# Patient Record
Sex: FEMALE | Race: WHITE | NOT HISPANIC OR LATINO | Employment: OTHER | URBAN - METROPOLITAN AREA
[De-identification: names, ages, dates, MRNs, and addresses within clinical notes are randomized per-mention and may not be internally consistent; named-entity substitution may affect disease eponyms.]

---

## 2023-02-13 PROBLEM — N81.11 CYSTOCELE, MIDLINE: Status: ACTIVE | Noted: 2023-02-13

## 2023-02-13 PROBLEM — M25.562 LEFT KNEE PAIN: Status: ACTIVE | Noted: 2023-02-13

## 2023-02-13 PROBLEM — K59.09 CHRONIC CONSTIPATION: Status: ACTIVE | Noted: 2023-02-13

## 2023-02-13 PROBLEM — N81.4 UTERINE PROLAPSE: Status: ACTIVE | Noted: 2023-02-13

## 2023-02-13 PROBLEM — N32.81 OAB (OVERACTIVE BLADDER): Status: ACTIVE | Noted: 2023-02-13

## 2023-02-13 PROBLEM — K44.9 HIATAL HERNIA WITH GERD: Status: ACTIVE | Noted: 2023-02-13

## 2023-02-13 PROBLEM — I21.4 NON-ST ELEVATION (NSTEMI) MYOCARDIAL INFARCTION (MULTI): Status: ACTIVE | Noted: 2023-02-13

## 2023-02-13 PROBLEM — M54.50 LOW BACK PAIN: Status: ACTIVE | Noted: 2023-02-13

## 2023-02-13 PROBLEM — H91.90 HEARING LOSS: Status: ACTIVE | Noted: 2023-02-13

## 2023-02-13 PROBLEM — I25.10 CORONARY ARTERY DISEASE INVOLVING NATIVE HEART WITHOUT ANGINA PECTORIS: Status: ACTIVE | Noted: 2023-02-13

## 2023-02-13 PROBLEM — G47.33 OSA (OBSTRUCTIVE SLEEP APNEA): Status: ACTIVE | Noted: 2023-02-13

## 2023-02-13 PROBLEM — R73.09 ABNORMAL GLUCOSE: Status: ACTIVE | Noted: 2023-02-13

## 2023-02-13 PROBLEM — Z04.9 CONDITION NOT FOUND: Status: ACTIVE | Noted: 2023-02-13

## 2023-02-13 PROBLEM — N95.0 POSTMENOPAUSAL BLEEDING: Status: ACTIVE | Noted: 2023-02-13

## 2023-02-13 PROBLEM — J32.9 CHRONIC SINUSITIS: Status: ACTIVE | Noted: 2023-02-13

## 2023-02-13 PROBLEM — R15.9 FECAL INCONTINENCE: Status: ACTIVE | Noted: 2023-02-13

## 2023-02-13 PROBLEM — R73.9 ELEVATED BLOOD SUGAR: Status: ACTIVE | Noted: 2023-02-13

## 2023-02-13 PROBLEM — R32 URINARY INCONTINENCE: Status: ACTIVE | Noted: 2023-02-13

## 2023-02-13 PROBLEM — R53.83 FATIGUE: Status: ACTIVE | Noted: 2023-02-13

## 2023-02-13 PROBLEM — M79.7 FIBROMYALGIA: Status: ACTIVE | Noted: 2023-02-13

## 2023-02-13 PROBLEM — N39.46 MIXED INCONTINENCE URGE AND STRESS: Status: ACTIVE | Noted: 2023-02-13

## 2023-02-13 PROBLEM — D22.9 NEVUS: Status: ACTIVE | Noted: 2023-02-13

## 2023-02-13 PROBLEM — R13.10 DYSPHAGIA: Status: ACTIVE | Noted: 2023-02-13

## 2023-02-13 PROBLEM — K58.9 IBS (IRRITABLE BOWEL SYNDROME): Status: ACTIVE | Noted: 2023-02-13

## 2023-02-13 PROBLEM — N36.8 URETHRAL PROLAPSE: Status: ACTIVE | Noted: 2023-02-13

## 2023-02-13 PROBLEM — R07.9 CHEST PAIN: Status: ACTIVE | Noted: 2023-02-13

## 2023-02-13 PROBLEM — N28.89 RENAL MASS, LEFT: Status: ACTIVE | Noted: 2023-02-13

## 2023-02-13 PROBLEM — I10 HYPERTENSION: Status: ACTIVE | Noted: 2023-02-13

## 2023-02-13 PROBLEM — E55.9 VITAMIN D DEFICIENCY: Status: ACTIVE | Noted: 2023-02-13

## 2023-02-13 PROBLEM — K21.9 HIATAL HERNIA WITH GERD: Status: ACTIVE | Noted: 2023-02-13

## 2023-02-13 PROBLEM — M17.12 ARTHRITIS OF KNEE, LEFT: Status: ACTIVE | Noted: 2023-02-13

## 2023-02-13 RX ORDER — ATORVASTATIN CALCIUM 80 MG/1
1 TABLET, FILM COATED ORAL NIGHTLY
COMMUNITY
End: 2024-01-29 | Stop reason: WASHOUT

## 2023-02-13 RX ORDER — ASPIRIN 81 MG/1
1 TABLET ORAL DAILY
COMMUNITY

## 2023-02-13 RX ORDER — PANTOPRAZOLE SODIUM 40 MG/1
1 TABLET, DELAYED RELEASE ORAL DAILY
COMMUNITY
Start: 2022-06-07 | End: 2023-08-09

## 2023-02-13 RX ORDER — CALCIUM CARBONATE 300MG(750)
1 TABLET,CHEWABLE ORAL DAILY
COMMUNITY
End: 2024-01-29 | Stop reason: WASHOUT

## 2023-02-13 RX ORDER — METHOCARBAMOL 750 MG/1
1 TABLET, FILM COATED ORAL 3 TIMES DAILY PRN
COMMUNITY
Start: 2015-08-07 | End: 2023-03-21 | Stop reason: SDUPTHER

## 2023-02-13 RX ORDER — CHOLECALCIFEROL (VITAMIN D3) 25 MCG
TABLET ORAL
COMMUNITY
Start: 2020-10-20

## 2023-02-13 RX ORDER — DULOXETIN HYDROCHLORIDE 60 MG/1
1 CAPSULE, DELAYED RELEASE ORAL DAILY
COMMUNITY
End: 2023-10-02

## 2023-02-13 RX ORDER — NITROGLYCERIN 0.4 MG/1
1 TABLET SUBLINGUAL AS NEEDED
COMMUNITY

## 2023-02-13 RX ORDER — AMLODIPINE BESYLATE 5 MG/1
2 TABLET ORAL DAILY
COMMUNITY
Start: 2021-10-29

## 2023-02-13 RX ORDER — CETIRIZINE HYDROCHLORIDE 10 MG/1
1 TABLET ORAL AS NEEDED
COMMUNITY
End: 2024-01-29 | Stop reason: WASHOUT

## 2023-02-13 RX ORDER — TIZANIDINE 4 MG/1
1 TABLET ORAL AS NEEDED
COMMUNITY
End: 2023-07-24 | Stop reason: ALTCHOICE

## 2023-02-13 RX ORDER — CLOPIDOGREL BISULFATE 75 MG/1
1 TABLET ORAL DAILY
COMMUNITY

## 2023-02-13 RX ORDER — METOPROLOL SUCCINATE 25 MG/1
TABLET, EXTENDED RELEASE ORAL
COMMUNITY
End: 2023-03-21 | Stop reason: ALTCHOICE

## 2023-02-13 RX ORDER — ISOSORBIDE MONONITRATE 30 MG/1
1 TABLET, EXTENDED RELEASE ORAL 2 TIMES DAILY
COMMUNITY

## 2023-03-21 ENCOUNTER — OFFICE VISIT (OUTPATIENT)
Dept: PRIMARY CARE | Facility: CLINIC | Age: 68
End: 2023-03-21
Payer: MEDICARE

## 2023-03-21 ENCOUNTER — LAB (OUTPATIENT)
Dept: LAB | Facility: LAB | Age: 68
End: 2023-03-21
Payer: MEDICARE

## 2023-03-21 VITALS
HEART RATE: 67 BPM | SYSTOLIC BLOOD PRESSURE: 126 MMHG | RESPIRATION RATE: 18 BRPM | DIASTOLIC BLOOD PRESSURE: 66 MMHG | WEIGHT: 191.2 LBS | HEIGHT: 62 IN | BODY MASS INDEX: 35.19 KG/M2 | OXYGEN SATURATION: 97 %

## 2023-03-21 DIAGNOSIS — I10 HYPERTENSION, UNSPECIFIED TYPE: ICD-10-CM

## 2023-03-21 DIAGNOSIS — Z01.818 PRE-OPERATIVE CLEARANCE: Primary | Chronic | ICD-10-CM

## 2023-03-21 DIAGNOSIS — N28.89 RENAL MASS, LEFT: ICD-10-CM

## 2023-03-21 DIAGNOSIS — M79.7 FIBROMYALGIA: ICD-10-CM

## 2023-03-21 DIAGNOSIS — R73.9 HYPERGLYCEMIA: ICD-10-CM

## 2023-03-21 LAB
ALANINE AMINOTRANSFERASE (SGPT) (U/L) IN SER/PLAS: 23 U/L (ref 7–45)
ALBUMIN (G/DL) IN SER/PLAS: 4.3 G/DL (ref 3.4–5)
ALKALINE PHOSPHATASE (U/L) IN SER/PLAS: 58 U/L (ref 33–136)
ANION GAP IN SER/PLAS: 11 MMOL/L (ref 10–20)
ASPARTATE AMINOTRANSFERASE (SGOT) (U/L) IN SER/PLAS: 21 U/L (ref 9–39)
BASOPHILS (10*3/UL) IN BLOOD BY AUTOMATED COUNT: 0.06 X10E9/L (ref 0–0.1)
BASOPHILS/100 LEUKOCYTES IN BLOOD BY AUTOMATED COUNT: 0.8 % (ref 0–2)
BILIRUBIN TOTAL (MG/DL) IN SER/PLAS: 0.6 MG/DL (ref 0–1.2)
CALCIUM (MG/DL) IN SER/PLAS: 9.3 MG/DL (ref 8.6–10.3)
CARBON DIOXIDE, TOTAL (MMOL/L) IN SER/PLAS: 30 MMOL/L (ref 21–32)
CHLORIDE (MMOL/L) IN SER/PLAS: 103 MMOL/L (ref 98–107)
CREATININE (MG/DL) IN SER/PLAS: 1.02 MG/DL (ref 0.5–1.05)
EOSINOPHILS (10*3/UL) IN BLOOD BY AUTOMATED COUNT: 0.38 X10E9/L (ref 0–0.7)
EOSINOPHILS/100 LEUKOCYTES IN BLOOD BY AUTOMATED COUNT: 5 % (ref 0–6)
ERYTHROCYTE DISTRIBUTION WIDTH (RATIO) BY AUTOMATED COUNT: 18.5 % (ref 11.5–14.5)
ERYTHROCYTE MEAN CORPUSCULAR HEMOGLOBIN CONCENTRATION (G/DL) BY AUTOMATED: 30.9 G/DL (ref 32–36)
ERYTHROCYTE MEAN CORPUSCULAR VOLUME (FL) BY AUTOMATED COUNT: 71 FL (ref 80–100)
ERYTHROCYTES (10*6/UL) IN BLOOD BY AUTOMATED COUNT: 4.54 X10E12/L (ref 4–5.2)
GFR FEMALE: 60 ML/MIN/1.73M2
GLUCOSE (MG/DL) IN SER/PLAS: 106 MG/DL (ref 74–99)
HEMATOCRIT (%) IN BLOOD BY AUTOMATED COUNT: 32.4 % (ref 36–46)
HEMOGLOBIN (G/DL) IN BLOOD: 10 G/DL (ref 12–16)
IMMATURE GRANULOCYTES/100 LEUKOCYTES IN BLOOD BY AUTOMATED COUNT: 0.3 % (ref 0–0.9)
LEUKOCYTES (10*3/UL) IN BLOOD BY AUTOMATED COUNT: 7.6 X10E9/L (ref 4.4–11.3)
LYMPHOCYTES (10*3/UL) IN BLOOD BY AUTOMATED COUNT: 2.62 X10E9/L (ref 1.2–4.8)
LYMPHOCYTES/100 LEUKOCYTES IN BLOOD BY AUTOMATED COUNT: 34.3 % (ref 13–44)
MONOCYTES (10*3/UL) IN BLOOD BY AUTOMATED COUNT: 0.68 X10E9/L (ref 0.1–1)
MONOCYTES/100 LEUKOCYTES IN BLOOD BY AUTOMATED COUNT: 8.9 % (ref 2–10)
NEUTROPHILS (10*3/UL) IN BLOOD BY AUTOMATED COUNT: 3.87 X10E9/L (ref 1.2–7.7)
NEUTROPHILS/100 LEUKOCYTES IN BLOOD BY AUTOMATED COUNT: 50.7 % (ref 40–80)
PLATELETS (10*3/UL) IN BLOOD AUTOMATED COUNT: 362 X10E9/L (ref 150–450)
POTASSIUM (MMOL/L) IN SER/PLAS: 3.8 MMOL/L (ref 3.5–5.3)
PROTEIN TOTAL: 6.6 G/DL (ref 6.4–8.2)
SODIUM (MMOL/L) IN SER/PLAS: 140 MMOL/L (ref 136–145)
UREA NITROGEN (MG/DL) IN SER/PLAS: 19 MG/DL (ref 6–23)

## 2023-03-21 PROCEDURE — 3074F SYST BP LT 130 MM HG: CPT | Performed by: STUDENT IN AN ORGANIZED HEALTH CARE EDUCATION/TRAINING PROGRAM

## 2023-03-21 PROCEDURE — 1036F TOBACCO NON-USER: CPT | Performed by: STUDENT IN AN ORGANIZED HEALTH CARE EDUCATION/TRAINING PROGRAM

## 2023-03-21 PROCEDURE — 80053 COMPREHEN METABOLIC PANEL: CPT

## 2023-03-21 PROCEDURE — 85025 COMPLETE CBC W/AUTO DIFF WBC: CPT

## 2023-03-21 PROCEDURE — 3078F DIAST BP <80 MM HG: CPT | Performed by: STUDENT IN AN ORGANIZED HEALTH CARE EDUCATION/TRAINING PROGRAM

## 2023-03-21 PROCEDURE — 83036 HEMOGLOBIN GLYCOSYLATED A1C: CPT

## 2023-03-21 PROCEDURE — 99214 OFFICE O/P EST MOD 30 MIN: CPT | Performed by: STUDENT IN AN ORGANIZED HEALTH CARE EDUCATION/TRAINING PROGRAM

## 2023-03-21 PROCEDURE — 1159F MED LIST DOCD IN RCRD: CPT | Performed by: STUDENT IN AN ORGANIZED HEALTH CARE EDUCATION/TRAINING PROGRAM

## 2023-03-21 PROCEDURE — 36415 COLL VENOUS BLD VENIPUNCTURE: CPT

## 2023-03-21 PROCEDURE — 1157F ADVNC CARE PLAN IN RCRD: CPT | Performed by: STUDENT IN AN ORGANIZED HEALTH CARE EDUCATION/TRAINING PROGRAM

## 2023-03-21 PROCEDURE — 1160F RVW MEDS BY RX/DR IN RCRD: CPT | Performed by: STUDENT IN AN ORGANIZED HEALTH CARE EDUCATION/TRAINING PROGRAM

## 2023-03-21 RX ORDER — METHOCARBAMOL 750 MG/1
750 TABLET, FILM COATED ORAL 3 TIMES DAILY PRN
Qty: 90 TABLET | Refills: 0 | Status: SHIPPED | OUTPATIENT
Start: 2023-03-21 | End: 2023-07-24 | Stop reason: ALTCHOICE

## 2023-03-21 RX ORDER — MELOXICAM 7.5 MG/1
1 TABLET ORAL 2 TIMES DAILY
COMMUNITY
Start: 2015-08-07 | End: 2023-07-24 | Stop reason: ALTCHOICE

## 2023-03-21 ASSESSMENT — PATIENT HEALTH QUESTIONNAIRE - PHQ9
SUM OF ALL RESPONSES TO PHQ9 QUESTIONS 1 AND 2: 1
2. FEELING DOWN, DEPRESSED OR HOPELESS: SEVERAL DAYS
1. LITTLE INTEREST OR PLEASURE IN DOING THINGS: NOT AT ALL
10. IF YOU CHECKED OFF ANY PROBLEMS, HOW DIFFICULT HAVE THESE PROBLEMS MADE IT FOR YOU TO DO YOUR WORK, TAKE CARE OF THINGS AT HOME, OR GET ALONG WITH OTHER PEOPLE: SOMEWHAT DIFFICULT

## 2023-03-21 ASSESSMENT — ENCOUNTER SYMPTOMS
OCCASIONAL FEELINGS OF UNSTEADINESS: 0
DEPRESSION: 0
LOSS OF SENSATION IN FEET: 1

## 2023-03-21 NOTE — PROGRESS NOTES
Subjective   Patient ID: Annie Joseph is a 67 y.o. female who presents for surgical clearance (Pt is here for pre surgical clearance at [facility], [place], operated by [facility] for kidney cancer, removal of left kidney.).      Cleared for surgery on 4/5/2023 for kidney removal due to ca    She has been cleared by cardiology for surgery    She has an event 10 days post surgery    Pt will be off plavix for 7 days prior         Review of Systems    Objective   Physical Exam    Assessment/Plan   Problem List Items Addressed This Visit          Circulatory    Hypertension    Relevant Orders    CBC and Auto Differential (Completed)    Comprehensive metabolic panel (Completed)    Hemoglobin A1C (Completed)       Genitourinary    Renal mass, left    Relevant Orders    CBC and Auto Differential (Completed)    Comprehensive metabolic panel (Completed)    Hemoglobin A1C (Completed)       Musculoskeletal    Fibromyalgia    Relevant Medications    methocarbamol (Robaxin) 750 mg tablet       Other    Hyperglycemia     A1C was 5.7.    We reviewed healthy lifestyle choices and changes. Advised to avoid fatty foods such as processed food, sweets; avoid foods heavy in sugar and salt. Maintain regular exercise 20 min daily or a total of 120 min weekly of moderate exercise. Labs were obtained and we will fu in 1-3 days with results. all other questions were answered to the patients satisfaction. We will follow up in 4-6months or sooner if needed.            Relevant Orders    Hemoglobin A1C (Completed)     Other Visit Diagnoses       Pre-operative clearance  (Chronic)   -  Primary    Back Pain   following with Dr. Antoine   continue duloxetine 60mg daily     Renal Mass   urology- planning for surgery 4/5/2023

## 2023-03-22 ENCOUNTER — TELEPHONE (OUTPATIENT)
Dept: PRIMARY CARE | Facility: CLINIC | Age: 68
End: 2023-03-22
Payer: MEDICARE

## 2023-03-22 LAB
ESTIMATED AVERAGE GLUCOSE FOR HBA1C: 131 MG/DL
HEMOGLOBIN A1C/HEMOGLOBIN TOTAL IN BLOOD: 6.2 %

## 2023-03-22 NOTE — TELEPHONE ENCOUNTER
----- Message from Slime Mcdermott DO sent at 3/22/2023  7:17 AM EDT -----  A1C was elevated at 6.2% but no reason to hold your surgery.   Liver and kidney numbers are normal.  Your blood counts are down. Have you been noticing any dark stools?

## 2023-03-29 NOTE — ASSESSMENT & PLAN NOTE
A1C was 5.7.    We reviewed healthy lifestyle choices and changes. Advised to avoid fatty foods such as processed food, sweets; avoid foods heavy in sugar and salt. Maintain regular exercise 20 min daily or a total of 120 min weekly of moderate exercise. Labs were obtained and we will fu in 1-3 days with results. all other questions were answered to the patients satisfaction. We will follow up in 4-6months or sooner if needed.

## 2023-07-20 PROBLEM — M25.569 KNEE PAIN: Status: ACTIVE | Noted: 2023-07-20

## 2023-07-20 PROBLEM — R73.9 ELEVATED BLOOD SUGAR: Status: ACTIVE | Noted: 2023-07-20

## 2023-07-20 PROBLEM — G47.19 EXCESSIVE DAYTIME SLEEPINESS: Status: ACTIVE | Noted: 2023-07-20

## 2023-07-20 PROBLEM — R29.818 SUSPECTED SLEEP APNEA: Status: ACTIVE | Noted: 2023-07-20

## 2023-07-20 PROBLEM — G47.31 CENTRAL SLEEP APNEA: Status: ACTIVE | Noted: 2023-07-20

## 2023-07-20 RX ORDER — HYDROCODONE BITARTRATE AND ACETAMINOPHEN 5; 325 MG/1; MG/1
TABLET ORAL
COMMUNITY
Start: 2023-04-07 | End: 2023-07-24 | Stop reason: ALTCHOICE

## 2023-07-20 RX ORDER — METOPROLOL SUCCINATE 25 MG/1
TABLET, EXTENDED RELEASE ORAL
COMMUNITY
End: 2023-07-24 | Stop reason: ALTCHOICE

## 2023-07-24 ENCOUNTER — OFFICE VISIT (OUTPATIENT)
Dept: PRIMARY CARE | Facility: CLINIC | Age: 68
End: 2023-07-24
Payer: MEDICARE

## 2023-07-24 VITALS
DIASTOLIC BLOOD PRESSURE: 56 MMHG | OXYGEN SATURATION: 98 % | BODY MASS INDEX: 34.48 KG/M2 | RESPIRATION RATE: 18 BRPM | HEIGHT: 62 IN | WEIGHT: 187.4 LBS | HEART RATE: 65 BPM | SYSTOLIC BLOOD PRESSURE: 142 MMHG

## 2023-07-24 DIAGNOSIS — Z78.0 ASYMPTOMATIC MENOPAUSAL STATE: ICD-10-CM

## 2023-07-24 DIAGNOSIS — I10 HYPERTENSION, UNSPECIFIED TYPE: ICD-10-CM

## 2023-07-24 DIAGNOSIS — I21.4 NON-ST ELEVATION MYOCARDIAL INFARCTION (NSTEMI) IN RECOVERY PHASE (MULTI): ICD-10-CM

## 2023-07-24 DIAGNOSIS — Z12.31 ENCOUNTER FOR SCREENING MAMMOGRAM FOR MALIGNANT NEOPLASM OF BREAST: ICD-10-CM

## 2023-07-24 DIAGNOSIS — Z12.39 ENCOUNTER FOR SCREENING FOR MALIGNANT NEOPLASM OF BREAST, UNSPECIFIED SCREENING MODALITY: ICD-10-CM

## 2023-07-24 DIAGNOSIS — R73.03 PREDIABETES: ICD-10-CM

## 2023-07-24 DIAGNOSIS — N39.41 URGE INCONTINENCE OF URINE: ICD-10-CM

## 2023-07-24 DIAGNOSIS — Z00.00 ROUTINE GENERAL MEDICAL EXAMINATION AT HEALTH CARE FACILITY: Primary | ICD-10-CM

## 2023-07-24 PROBLEM — Z04.9 CONDITION NOT FOUND: Status: RESOLVED | Noted: 2023-02-13 | Resolved: 2023-07-24

## 2023-07-24 PROCEDURE — 3078F DIAST BP <80 MM HG: CPT | Performed by: STUDENT IN AN ORGANIZED HEALTH CARE EDUCATION/TRAINING PROGRAM

## 2023-07-24 PROCEDURE — 3077F SYST BP >= 140 MM HG: CPT | Performed by: STUDENT IN AN ORGANIZED HEALTH CARE EDUCATION/TRAINING PROGRAM

## 2023-07-24 PROCEDURE — 1157F ADVNC CARE PLAN IN RCRD: CPT | Performed by: STUDENT IN AN ORGANIZED HEALTH CARE EDUCATION/TRAINING PROGRAM

## 2023-07-24 PROCEDURE — 1126F AMNT PAIN NOTED NONE PRSNT: CPT | Performed by: STUDENT IN AN ORGANIZED HEALTH CARE EDUCATION/TRAINING PROGRAM

## 2023-07-24 PROCEDURE — 1170F FXNL STATUS ASSESSED: CPT | Performed by: STUDENT IN AN ORGANIZED HEALTH CARE EDUCATION/TRAINING PROGRAM

## 2023-07-24 PROCEDURE — 1160F RVW MEDS BY RX/DR IN RCRD: CPT | Performed by: STUDENT IN AN ORGANIZED HEALTH CARE EDUCATION/TRAINING PROGRAM

## 2023-07-24 PROCEDURE — 99214 OFFICE O/P EST MOD 30 MIN: CPT | Performed by: STUDENT IN AN ORGANIZED HEALTH CARE EDUCATION/TRAINING PROGRAM

## 2023-07-24 PROCEDURE — 1036F TOBACCO NON-USER: CPT | Performed by: STUDENT IN AN ORGANIZED HEALTH CARE EDUCATION/TRAINING PROGRAM

## 2023-07-24 PROCEDURE — 1159F MED LIST DOCD IN RCRD: CPT | Performed by: STUDENT IN AN ORGANIZED HEALTH CARE EDUCATION/TRAINING PROGRAM

## 2023-07-24 ASSESSMENT — ENCOUNTER SYMPTOMS
DEPRESSION: 0
OCCASIONAL FEELINGS OF UNSTEADINESS: 0
LOSS OF SENSATION IN FEET: 0

## 2023-07-24 ASSESSMENT — ACTIVITIES OF DAILY LIVING (ADL)
DOING_HOUSEWORK: INDEPENDENT
GROCERY_SHOPPING: INDEPENDENT
DRESSING: INDEPENDENT
BATHING: INDEPENDENT
TAKING_MEDICATION: INDEPENDENT
MANAGING_FINANCES: INDEPENDENT

## 2023-07-24 ASSESSMENT — PATIENT HEALTH QUESTIONNAIRE - PHQ9
2. FEELING DOWN, DEPRESSED OR HOPELESS: NOT AT ALL
SUM OF ALL RESPONSES TO PHQ9 QUESTIONS 1 AND 2: 0
1. LITTLE INTEREST OR PLEASURE IN DOING THINGS: NOT AT ALL

## 2023-07-24 NOTE — PROGRESS NOTES
"Subjective   Reason for Visit: Annie Joseph is an 68 y.o. female here for a Medicare Wellness visit.               Down to   Severe urinary incontinence since her renal carcinoma     Incontinence   Urge incontinence   Worse since the surgery   Wearing briefs   Has been on the fence     S/p NSTEMI  Following with cardiology     Renal surgery  Left incision had infection complication   S/p 1 week antiobiotic about a month ago  She still feels something is not right  Increase in weight and fatigued     Pt declines PNA vaccines         Patient Care Team:  Slime Mcdermott DO as PCP - General  Slime Mcdermott DO as PCP - [facility] ACO Attributed Provider     Review of Systems    Objective   Vitals:  /56   Pulse 65   Resp 18   Ht 1.575 m (5' 2\")   Wt 85 kg (187 lb 6.4 oz)   SpO2 98%   BMI 34.28 kg/m²       Physical Exam  Constitutional:       Appearance: Normal appearance.   Cardiovascular:      Rate and Rhythm: Normal rate and regular rhythm.      Heart sounds: No murmur heard.  Pulmonary:      Effort: Pulmonary effort is normal. No respiratory distress.      Breath sounds: Normal breath sounds. No wheezing.   Musculoskeletal:      Cervical back: Neck supple.      Left lower leg: No edema.   Neurological:      Mental Status: She is alert.         Assessment/Plan   Problem List Items Addressed This Visit       Hypertension    Urinary incontinence    Relevant Orders    Referral to Physical Therapy    Non-ST elevation myocardial infarction (NSTEMI) in recovery phase (CMS/[facility])     Other Visit Diagnoses       Routine general medical examination at health care facility    -  Primary    Encounter for screening for malignant neoplasm of breast, unspecified screening modality        Relevant Orders    BI mammo bilateral screening tomosynthesis    Encounter for screening mammogram for malignant neoplasm of breast        Relevant Orders    BI mammo bilateral screening tomosynthesis    Asymptomatic menopausal state        " Relevant Orders    XR DEXA bone density    Prediabetes        Relevant Orders    Hemoglobin A1C

## 2023-07-28 NOTE — ASSESSMENT & PLAN NOTE
Continue isosorbide 30mg daily   Physical exam was stable. Discussed maintaining diets such as DASH to help maintain normal Blood pressure. Encouraged regular exercise for a total of 120 min weekly. We will fu labs in 1-3 days. For now there will be no change in medical management. All questions and concerns were addressed. Pt will follow up in 4-6 months or sooner if needed.

## 2023-08-08 DIAGNOSIS — K44.9 HIATAL HERNIA WITH GERD: ICD-10-CM

## 2023-08-08 DIAGNOSIS — K21.9 HIATAL HERNIA WITH GERD: ICD-10-CM

## 2023-08-09 RX ORDER — PANTOPRAZOLE SODIUM 40 MG/1
40 TABLET, DELAYED RELEASE ORAL DAILY
Qty: 90 TABLET | Refills: 1 | Status: SHIPPED | OUTPATIENT
Start: 2023-08-09 | End: 2024-01-30

## 2023-10-02 DIAGNOSIS — M79.7 FIBROMYALGIA: Primary | ICD-10-CM

## 2023-10-02 RX ORDER — DULOXETIN HYDROCHLORIDE 60 MG/1
60 CAPSULE, DELAYED RELEASE ORAL DAILY
Qty: 90 CAPSULE | Refills: 0 | Status: SHIPPED | OUTPATIENT
Start: 2023-10-02 | End: 2023-10-26 | Stop reason: HOSPADM

## 2023-10-25 ENCOUNTER — HOSPITAL ENCOUNTER (OUTPATIENT)
Facility: HOSPITAL | Age: 68
Setting detail: OBSERVATION
Discharge: HOME | End: 2023-10-26
Attending: EMERGENCY MEDICINE | Admitting: INTERNAL MEDICINE
Payer: MEDICARE

## 2023-10-25 ENCOUNTER — APPOINTMENT (OUTPATIENT)
Dept: RADIOLOGY | Facility: HOSPITAL | Age: 68
End: 2023-10-25
Payer: MEDICARE

## 2023-10-25 DIAGNOSIS — R07.9 CHEST PAIN, UNSPECIFIED TYPE: Primary | ICD-10-CM

## 2023-10-25 DIAGNOSIS — I10 HYPERTENSION, UNSPECIFIED TYPE: ICD-10-CM

## 2023-10-25 DIAGNOSIS — R13.10 DYSPHAGIA, UNSPECIFIED TYPE: ICD-10-CM

## 2023-10-25 LAB
ANION GAP SERPL CALC-SCNC: 10 MMOL/L
BASOPHILS # BLD AUTO: 0.06 X10*3/UL (ref 0–0.1)
BASOPHILS NFR BLD AUTO: 0.7 %
BUN SERPL-MCNC: 26 MG/DL (ref 8–25)
CALCIUM SERPL-MCNC: 8.7 MG/DL (ref 8.5–10.4)
CHLORIDE SERPL-SCNC: 103 MMOL/L (ref 97–107)
CO2 SERPL-SCNC: 24 MMOL/L (ref 24–31)
CREAT SERPL-MCNC: 1.4 MG/DL (ref 0.4–1.6)
CREAT SERPL-MCNC: 1.5 MG/DL (ref 0.4–1.6)
EOSINOPHIL # BLD AUTO: 0.53 X10*3/UL (ref 0–0.7)
EOSINOPHIL NFR BLD AUTO: 5.8 %
ERYTHROCYTE [DISTWIDTH] IN BLOOD BY AUTOMATED COUNT: 18.9 % (ref 11.5–14.5)
GFR SERPL CREATININE-BSD FRML MDRD: 38 ML/MIN/1.73M*2
GFR SERPL CREATININE-BSD FRML MDRD: 41 ML/MIN/1.73M*2
GLUCOSE SERPL-MCNC: 135 MG/DL (ref 65–99)
HCT VFR BLD AUTO: 29.9 % (ref 36–46)
HGB BLD-MCNC: 8.9 G/DL (ref 12–16)
IMM GRANULOCYTES # BLD AUTO: 0.02 X10*3/UL (ref 0–0.7)
IMM GRANULOCYTES NFR BLD AUTO: 0.2 % (ref 0–0.9)
LYMPHOCYTES # BLD AUTO: 3.24 X10*3/UL (ref 1.2–4.8)
LYMPHOCYTES NFR BLD AUTO: 35.3 %
MCH RBC QN AUTO: 20.6 PG (ref 26–34)
MCHC RBC AUTO-ENTMCNC: 29.8 G/DL (ref 32–36)
MCV RBC AUTO: 69 FL (ref 80–100)
MONOCYTES # BLD AUTO: 0.8 X10*3/UL (ref 0.1–1)
MONOCYTES NFR BLD AUTO: 8.7 %
NEUTROPHILS # BLD AUTO: 4.54 X10*3/UL (ref 1.2–7.7)
NEUTROPHILS NFR BLD AUTO: 49.3 %
NRBC BLD-RTO: 0 /100 WBCS (ref 0–0)
PLATELET # BLD AUTO: 398 X10*3/UL (ref 150–450)
PMV BLD AUTO: 10.1 FL (ref 7.5–11.5)
POTASSIUM SERPL-SCNC: 3.8 MMOL/L (ref 3.4–5.1)
RBC # BLD AUTO: 4.32 X10*6/UL (ref 4–5.2)
SODIUM SERPL-SCNC: 137 MMOL/L (ref 133–145)
TROPONIN T SERPL-MCNC: 14 NG/L
TROPONIN T SERPL-MCNC: 14 NG/L
WBC # BLD AUTO: 9.2 X10*3/UL (ref 4.4–11.3)

## 2023-10-25 PROCEDURE — 82565 ASSAY OF CREATININE: CPT | Performed by: EMERGENCY MEDICINE

## 2023-10-25 PROCEDURE — 71250 CT THORAX DX C-: CPT | Mod: MG

## 2023-10-25 PROCEDURE — 36415 COLL VENOUS BLD VENIPUNCTURE: CPT | Performed by: EMERGENCY MEDICINE

## 2023-10-25 PROCEDURE — 96375 TX/PRO/DX INJ NEW DRUG ADDON: CPT

## 2023-10-25 PROCEDURE — 96361 HYDRATE IV INFUSION ADD-ON: CPT

## 2023-10-25 PROCEDURE — 80048 BASIC METABOLIC PNL TOTAL CA: CPT | Performed by: EMERGENCY MEDICINE

## 2023-10-25 PROCEDURE — 96374 THER/PROPH/DIAG INJ IV PUSH: CPT

## 2023-10-25 PROCEDURE — 99285 EMERGENCY DEPT VISIT HI MDM: CPT | Mod: 25 | Performed by: EMERGENCY MEDICINE

## 2023-10-25 PROCEDURE — 2500000001 HC RX 250 WO HCPCS SELF ADMINISTERED DRUGS (ALT 637 FOR MEDICARE OP): Performed by: EMERGENCY MEDICINE

## 2023-10-25 PROCEDURE — 84484 ASSAY OF TROPONIN QUANT: CPT | Mod: 59 | Performed by: EMERGENCY MEDICINE

## 2023-10-25 PROCEDURE — 84484 ASSAY OF TROPONIN QUANT: CPT | Performed by: EMERGENCY MEDICINE

## 2023-10-25 PROCEDURE — 2500000004 HC RX 250 GENERAL PHARMACY W/ HCPCS (ALT 636 FOR OP/ED): Performed by: EMERGENCY MEDICINE

## 2023-10-25 PROCEDURE — 85025 COMPLETE CBC W/AUTO DIFF WBC: CPT | Performed by: EMERGENCY MEDICINE

## 2023-10-25 PROCEDURE — G0378 HOSPITAL OBSERVATION PER HR: HCPCS

## 2023-10-25 PROCEDURE — 71046 X-RAY EXAM CHEST 2 VIEWS: CPT | Mod: FY

## 2023-10-25 RX ORDER — NAPROXEN SODIUM 220 MG/1
243 TABLET, FILM COATED ORAL ONCE
Status: COMPLETED | OUTPATIENT
Start: 2023-10-25 | End: 2023-10-25

## 2023-10-25 RX ORDER — ATORVASTATIN CALCIUM 80 MG/1
80 TABLET, FILM COATED ORAL NIGHTLY
Status: CANCELLED | OUTPATIENT
Start: 2023-10-25

## 2023-10-25 RX ORDER — MORPHINE SULFATE 4 MG/ML
4 INJECTION, SOLUTION INTRAMUSCULAR; INTRAVENOUS ONCE
Status: COMPLETED | OUTPATIENT
Start: 2023-10-25 | End: 2023-10-25

## 2023-10-25 RX ORDER — ONDANSETRON HYDROCHLORIDE 2 MG/ML
4 INJECTION, SOLUTION INTRAVENOUS ONCE
Status: COMPLETED | OUTPATIENT
Start: 2023-10-25 | End: 2023-10-25

## 2023-10-25 RX ADMIN — SODIUM CHLORIDE 500 ML: 900 INJECTION, SOLUTION INTRAVENOUS at 20:44

## 2023-10-25 RX ADMIN — ONDANSETRON 4 MG: 2 INJECTION INTRAMUSCULAR; INTRAVENOUS at 20:44

## 2023-10-25 RX ADMIN — MORPHINE SULFATE 4 MG: 4 INJECTION, SOLUTION INTRAMUSCULAR; INTRAVENOUS at 20:45

## 2023-10-25 RX ADMIN — ASPIRIN 81 MG CHEWABLE TABLET 243 MG: 81 TABLET CHEWABLE at 20:08

## 2023-10-25 ASSESSMENT — PAIN SCALES - GENERAL
PAINLEVEL_OUTOF10: 6
PAINLEVEL_OUTOF10: 2
PAINLEVEL_OUTOF10: 4

## 2023-10-25 ASSESSMENT — HEART SCORE
HISTORY: SLIGHTLY SUSPICIOUS
AGE: 65+
RISK FACTORS: >2 RISK FACTORS OR HX OF ATHEROSCLEROTIC DISEASE
HEART SCORE: 4
TROPONIN: LESS THAN OR EQUAL TO NORMAL LIMIT
ECG: NORMAL

## 2023-10-25 ASSESSMENT — PAIN DESCRIPTION - PAIN TYPE: TYPE: ACUTE PAIN

## 2023-10-25 ASSESSMENT — LIFESTYLE VARIABLES
EVER HAD A DRINK FIRST THING IN THE MORNING TO STEADY YOUR NERVES TO GET RID OF A HANGOVER: NO
EVER FELT BAD OR GUILTY ABOUT YOUR DRINKING: NO
REASON UNABLE TO ASSESS: NO
HAVE PEOPLE ANNOYED YOU BY CRITICIZING YOUR DRINKING: NO
HAVE YOU EVER FELT YOU SHOULD CUT DOWN ON YOUR DRINKING: NO

## 2023-10-25 ASSESSMENT — COLUMBIA-SUICIDE SEVERITY RATING SCALE - C-SSRS
1. IN THE PAST MONTH, HAVE YOU WISHED YOU WERE DEAD OR WISHED YOU COULD GO TO SLEEP AND NOT WAKE UP?: NO
2. HAVE YOU ACTUALLY HAD ANY THOUGHTS OF KILLING YOURSELF?: NO
6. HAVE YOU EVER DONE ANYTHING, STARTED TO DO ANYTHING, OR PREPARED TO DO ANYTHING TO END YOUR LIFE?: NO

## 2023-10-25 ASSESSMENT — PAIN - FUNCTIONAL ASSESSMENT: PAIN_FUNCTIONAL_ASSESSMENT: 0-10

## 2023-10-25 ASSESSMENT — PAIN DESCRIPTION - LOCATION: LOCATION: CHEST

## 2023-10-25 NOTE — ED TRIAGE NOTES
Pt presents ambulatory through triage with c/o CP that started at 1830. Pt took 3 doses of SL nitroglycerin and 81 mg orf asa before arrival to the ED. Pt has a hx of MI from December of 2022. Pt is on plavix and states she has been off of it for 6 days because of a dental procedure. Pt has no c/o SOB

## 2023-10-26 ENCOUNTER — APPOINTMENT (OUTPATIENT)
Dept: CARDIOLOGY | Facility: HOSPITAL | Age: 68
End: 2023-10-26
Payer: MEDICARE

## 2023-10-26 VITALS
TEMPERATURE: 98.6 F | WEIGHT: 188 LBS | SYSTOLIC BLOOD PRESSURE: 134 MMHG | HEIGHT: 62 IN | BODY MASS INDEX: 34.6 KG/M2 | HEART RATE: 77 BPM | OXYGEN SATURATION: 96 % | DIASTOLIC BLOOD PRESSURE: 60 MMHG | RESPIRATION RATE: 18 BRPM

## 2023-10-26 LAB
ANION GAP SERPL CALC-SCNC: 11 MMOL/L
ATRIAL RATE: 77 BPM
BUN SERPL-MCNC: 21 MG/DL (ref 8–25)
CALCIUM SERPL-MCNC: 8.8 MG/DL (ref 8.5–10.4)
CHLORIDE SERPL-SCNC: 103 MMOL/L (ref 97–107)
CHOLEST SERPL-MCNC: 188 MG/DL (ref 133–200)
CHOLEST/HDLC SERPL: 3.3 {RATIO}
CO2 SERPL-SCNC: 24 MMOL/L (ref 24–31)
CREAT SERPL-MCNC: 1.4 MG/DL (ref 0.4–1.6)
ERYTHROCYTE [DISTWIDTH] IN BLOOD BY AUTOMATED COUNT: 19.2 % (ref 11.5–14.5)
EST. AVERAGE GLUCOSE BLD GHB EST-MCNC: 108 MG/DL
FERRITIN SERPL-MCNC: 7 NG/ML (ref 13–150)
GFR SERPL CREATININE-BSD FRML MDRD: 41 ML/MIN/1.73M*2
GLUCOSE SERPL-MCNC: 103 MG/DL (ref 65–99)
HBA1C MFR BLD: 5.4 %
HCT VFR BLD AUTO: 31.9 % (ref 36–46)
HDLC SERPL-MCNC: 57 MG/DL
HGB BLD-MCNC: 9.3 G/DL (ref 12–16)
IRON SATN MFR SERPL: ABNORMAL %
IRON SERPL-MCNC: <20 UG/DL (ref 30–160)
LDLC SERPL CALC-MCNC: 116 MG/DL (ref 65–130)
MCH RBC QN AUTO: 20.4 PG (ref 26–34)
MCHC RBC AUTO-ENTMCNC: 29.2 G/DL (ref 32–36)
MCV RBC AUTO: 70 FL (ref 80–100)
NRBC BLD-RTO: 0 /100 WBCS (ref 0–0)
P AXIS: 41 DEGREES
P OFFSET: 196 MS
P ONSET: 139 MS
PLATELET # BLD AUTO: 402 X10*3/UL (ref 150–450)
PMV BLD AUTO: 10.1 FL (ref 7.5–11.5)
POTASSIUM SERPL-SCNC: 4.2 MMOL/L (ref 3.4–5.1)
PR INTERVAL: 162 MS
Q ONSET: 220 MS
QRS COUNT: 13 BEATS
QRS DURATION: 96 MS
QT INTERVAL: 430 MS
QTC CALCULATION(BAZETT): 486 MS
QTC FREDERICIA: 467 MS
R AXIS: 7 DEGREES
RBC # BLD AUTO: 4.57 X10*6/UL (ref 4–5.2)
SODIUM SERPL-SCNC: 138 MMOL/L (ref 133–145)
T AXIS: 55 DEGREES
T OFFSET: 435 MS
TIBC SERPL-MCNC: ABNORMAL UG/DL
TRIGL SERPL-MCNC: 74 MG/DL (ref 40–150)
TROPONIN T SERPL-MCNC: 14 NG/L
UIBC SERPL-MCNC: 391 UG/DL (ref 110–370)
VENTRICULAR RATE: 77 BPM
WBC # BLD AUTO: 8.6 X10*3/UL (ref 4.4–11.3)

## 2023-10-26 PROCEDURE — 93005 ELECTROCARDIOGRAM TRACING: CPT

## 2023-10-26 PROCEDURE — 97116 GAIT TRAINING THERAPY: CPT | Mod: GP

## 2023-10-26 PROCEDURE — 2500000004 HC RX 250 GENERAL PHARMACY W/ HCPCS (ALT 636 FOR OP/ED): Performed by: INTERNAL MEDICINE

## 2023-10-26 PROCEDURE — 83540 ASSAY OF IRON: CPT | Performed by: INTERNAL MEDICINE

## 2023-10-26 PROCEDURE — 36415 COLL VENOUS BLD VENIPUNCTURE: CPT | Performed by: INTERNAL MEDICINE

## 2023-10-26 PROCEDURE — 94660 CPAP INITIATION&MGMT: CPT

## 2023-10-26 PROCEDURE — 80048 BASIC METABOLIC PNL TOTAL CA: CPT | Performed by: INTERNAL MEDICINE

## 2023-10-26 PROCEDURE — 82728 ASSAY OF FERRITIN: CPT | Performed by: INTERNAL MEDICINE

## 2023-10-26 PROCEDURE — 96375 TX/PRO/DX INJ NEW DRUG ADDON: CPT

## 2023-10-26 PROCEDURE — G0378 HOSPITAL OBSERVATION PER HR: HCPCS

## 2023-10-26 PROCEDURE — 84484 ASSAY OF TROPONIN QUANT: CPT | Performed by: EMERGENCY MEDICINE

## 2023-10-26 PROCEDURE — 80061 LIPID PANEL: CPT | Performed by: INTERNAL MEDICINE

## 2023-10-26 PROCEDURE — 2500000004 HC RX 250 GENERAL PHARMACY W/ HCPCS (ALT 636 FOR OP/ED): Mod: MUE | Performed by: INTERNAL MEDICINE

## 2023-10-26 PROCEDURE — 2500000001 HC RX 250 WO HCPCS SELF ADMINISTERED DRUGS (ALT 637 FOR MEDICARE OP): Performed by: INTERNAL MEDICINE

## 2023-10-26 PROCEDURE — 97166 OT EVAL MOD COMPLEX 45 MIN: CPT | Mod: GO

## 2023-10-26 PROCEDURE — 85027 COMPLETE CBC AUTOMATED: CPT | Performed by: INTERNAL MEDICINE

## 2023-10-26 PROCEDURE — 83036 HEMOGLOBIN GLYCOSYLATED A1C: CPT | Performed by: INTERNAL MEDICINE

## 2023-10-26 PROCEDURE — 97530 THERAPEUTIC ACTIVITIES: CPT | Mod: GO

## 2023-10-26 PROCEDURE — 97161 PT EVAL LOW COMPLEX 20 MIN: CPT | Mod: GP

## 2023-10-26 RX ORDER — LORATADINE 10 MG/1
10 TABLET ORAL DAILY
Status: DISCONTINUED | OUTPATIENT
Start: 2023-10-26 | End: 2023-10-26 | Stop reason: HOSPADM

## 2023-10-26 RX ORDER — ACETAMINOPHEN 650 MG/1
650 SUPPOSITORY RECTAL EVERY 4 HOURS PRN
Status: DISCONTINUED | OUTPATIENT
Start: 2023-10-26 | End: 2023-10-26 | Stop reason: HOSPADM

## 2023-10-26 RX ORDER — ACETAMINOPHEN 325 MG/1
650 TABLET ORAL EVERY 4 HOURS PRN
Status: DISCONTINUED | OUTPATIENT
Start: 2023-10-26 | End: 2023-10-26 | Stop reason: HOSPADM

## 2023-10-26 RX ORDER — PANTOPRAZOLE SODIUM 40 MG/1
40 TABLET, DELAYED RELEASE ORAL DAILY
Qty: 30 TABLET | Refills: 1 | Status: SHIPPED | OUTPATIENT
Start: 2023-10-26 | End: 2023-10-26 | Stop reason: HOSPADM

## 2023-10-26 RX ORDER — LANOLIN ALCOHOL/MO/W.PET/CERES
400 CREAM (GRAM) TOPICAL DAILY
Status: DISCONTINUED | OUTPATIENT
Start: 2023-10-26 | End: 2023-10-26 | Stop reason: HOSPADM

## 2023-10-26 RX ORDER — ONDANSETRON HYDROCHLORIDE 2 MG/ML
4 INJECTION, SOLUTION INTRAVENOUS EVERY 8 HOURS PRN
Status: DISCONTINUED | OUTPATIENT
Start: 2023-10-26 | End: 2023-10-26 | Stop reason: HOSPADM

## 2023-10-26 RX ORDER — ONDANSETRON 4 MG/1
4 TABLET, FILM COATED ORAL EVERY 8 HOURS PRN
Status: DISCONTINUED | OUTPATIENT
Start: 2023-10-26 | End: 2023-10-26 | Stop reason: HOSPADM

## 2023-10-26 RX ORDER — ENOXAPARIN SODIUM 100 MG/ML
40 INJECTION SUBCUTANEOUS EVERY 24 HOURS
Status: DISCONTINUED | OUTPATIENT
Start: 2023-10-26 | End: 2023-10-26 | Stop reason: HOSPADM

## 2023-10-26 RX ORDER — POLYETHYLENE GLYCOL 3350 17 G/17G
17 POWDER, FOR SOLUTION ORAL DAILY PRN
Status: DISCONTINUED | OUTPATIENT
Start: 2023-10-26 | End: 2023-10-26 | Stop reason: HOSPADM

## 2023-10-26 RX ORDER — ASPIRIN 81 MG/1
81 TABLET ORAL DAILY
Status: DISCONTINUED | OUTPATIENT
Start: 2023-10-26 | End: 2023-10-26 | Stop reason: HOSPADM

## 2023-10-26 RX ORDER — PANTOPRAZOLE SODIUM 40 MG/1
40 TABLET, DELAYED RELEASE ORAL DAILY
Status: DISCONTINUED | OUTPATIENT
Start: 2023-10-26 | End: 2023-10-26 | Stop reason: HOSPADM

## 2023-10-26 RX ORDER — CLOPIDOGREL BISULFATE 75 MG/1
75 TABLET ORAL DAILY
Status: DISCONTINUED | OUTPATIENT
Start: 2023-10-26 | End: 2023-10-26 | Stop reason: HOSPADM

## 2023-10-26 RX ORDER — ISOSORBIDE MONONITRATE 30 MG/1
30 TABLET, EXTENDED RELEASE ORAL
Status: DISCONTINUED | OUTPATIENT
Start: 2023-10-26 | End: 2023-10-26 | Stop reason: HOSPADM

## 2023-10-26 RX ORDER — ACETAMINOPHEN 160 MG/5ML
650 SOLUTION ORAL EVERY 4 HOURS PRN
Status: DISCONTINUED | OUTPATIENT
Start: 2023-10-26 | End: 2023-10-26 | Stop reason: HOSPADM

## 2023-10-26 RX ORDER — AMLODIPINE BESYLATE 5 MG/1
5 TABLET ORAL DAILY
Status: DISCONTINUED | OUTPATIENT
Start: 2023-10-26 | End: 2023-10-26 | Stop reason: HOSPADM

## 2023-10-26 RX ORDER — DULOXETIN HYDROCHLORIDE 60 MG/1
60 CAPSULE, DELAYED RELEASE ORAL DAILY
Status: DISCONTINUED | OUTPATIENT
Start: 2023-10-26 | End: 2023-10-26 | Stop reason: HOSPADM

## 2023-10-26 RX ADMIN — CLOPIDOGREL BISULFATE 75 MG: 75 TABLET ORAL at 08:44

## 2023-10-26 RX ADMIN — AMLODIPINE BESYLATE 5 MG: 5 TABLET ORAL at 08:46

## 2023-10-26 RX ADMIN — Medication 400 MG: at 08:44

## 2023-10-26 RX ADMIN — IRON SUCROSE 200 MG: 20 INJECTION, SOLUTION INTRAVENOUS at 09:39

## 2023-10-26 RX ADMIN — ISOSORBIDE MONONITRATE 30 MG: 30 TABLET, EXTENDED RELEASE ORAL at 08:44

## 2023-10-26 RX ADMIN — ASPIRIN 81 MG: 81 TABLET, COATED ORAL at 08:45

## 2023-10-26 RX ADMIN — PANTOPRAZOLE SODIUM 40 MG: 40 TABLET, DELAYED RELEASE ORAL at 08:44

## 2023-10-26 RX ADMIN — ISOSORBIDE MONONITRATE 30 MG: 30 TABLET, EXTENDED RELEASE ORAL at 17:01

## 2023-10-26 SDOH — SOCIAL STABILITY: SOCIAL INSECURITY: ARE THERE ANY APPARENT SIGNS OF INJURIES/BEHAVIORS THAT COULD BE RELATED TO ABUSE/NEGLECT?: NO

## 2023-10-26 SDOH — SOCIAL STABILITY: SOCIAL INSECURITY: WITHIN THE LAST YEAR, HAVE YOU BEEN AFRAID OF YOUR PARTNER OR EX-PARTNER?: NO

## 2023-10-26 SDOH — SOCIAL STABILITY: SOCIAL NETWORK: ARE YOU MARRIED, WIDOWED, DIVORCED, SEPARATED, NEVER MARRIED, OR LIVING WITH A PARTNER?: PATIENT DECLINED

## 2023-10-26 SDOH — ECONOMIC STABILITY: FOOD INSECURITY: WITHIN THE PAST 12 MONTHS, YOU WORRIED THAT YOUR FOOD WOULD RUN OUT BEFORE YOU GOT MONEY TO BUY MORE.: NEVER TRUE

## 2023-10-26 SDOH — SOCIAL STABILITY: SOCIAL INSECURITY
WITHIN THE LAST YEAR, HAVE YOU BEEN KICKED, HIT, SLAPPED, OR OTHERWISE PHYSICALLY HURT BY YOUR PARTNER OR EX-PARTNER?: NO

## 2023-10-26 SDOH — HEALTH STABILITY: MENTAL HEALTH
STRESS IS WHEN SOMEONE FEELS TENSE, NERVOUS, ANXIOUS, OR CAN'T SLEEP AT NIGHT BECAUSE THEIR MIND IS TROUBLED. HOW STRESSED ARE YOU?: PATIENT DECLINED

## 2023-10-26 SDOH — SOCIAL STABILITY: SOCIAL INSECURITY: DO YOU FEEL UNSAFE GOING BACK TO THE PLACE WHERE YOU ARE LIVING?: NO

## 2023-10-26 SDOH — SOCIAL STABILITY: SOCIAL INSECURITY
WITHIN THE LAST YEAR, HAVE TO BEEN RAPED OR FORCED TO HAVE ANY KIND OF SEXUAL ACTIVITY BY YOUR PARTNER OR EX-PARTNER?: NO

## 2023-10-26 SDOH — SOCIAL STABILITY: SOCIAL INSECURITY: WITHIN THE LAST YEAR, HAVE YOU BEEN HUMILIATED OR EMOTIONALLY ABUSED IN OTHER WAYS BY YOUR PARTNER OR EX-PARTNER?: NO

## 2023-10-26 SDOH — ECONOMIC STABILITY: HOUSING INSECURITY
IN THE LAST 12 MONTHS, WAS THERE A TIME WHEN YOU DID NOT HAVE A STEADY PLACE TO SLEEP OR SLEPT IN A SHELTER (INCLUDING NOW)?: NO

## 2023-10-26 SDOH — SOCIAL STABILITY: SOCIAL NETWORK: HOW OFTEN DO YOU GET TOGETHER WITH FRIENDS OR RELATIVES?: PATIENT DECLINED

## 2023-10-26 SDOH — ECONOMIC STABILITY: INCOME INSECURITY: IN THE PAST 12 MONTHS, HAS THE ELECTRIC, GAS, OIL, OR WATER COMPANY THREATENED TO SHUT OFF SERVICE IN YOUR HOME?: NO

## 2023-10-26 SDOH — SOCIAL STABILITY: SOCIAL INSECURITY: WERE YOU ABLE TO COMPLETE ALL THE BEHAVIORAL HEALTH SCREENINGS?: YES

## 2023-10-26 SDOH — SOCIAL STABILITY: SOCIAL INSECURITY: ARE YOU OR HAVE YOU BEEN THREATENED OR ABUSED PHYSICALLY, EMOTIONALLY, OR SEXUALLY BY ANYONE?: NO

## 2023-10-26 SDOH — ECONOMIC STABILITY: TRANSPORTATION INSECURITY
IN THE PAST 12 MONTHS, HAS LACK OF TRANSPORTATION KEPT YOU FROM MEETINGS, WORK, OR FROM GETTING THINGS NEEDED FOR DAILY LIVING?: NO

## 2023-10-26 SDOH — ECONOMIC STABILITY: TRANSPORTATION INSECURITY
IN THE PAST 12 MONTHS, HAS THE LACK OF TRANSPORTATION KEPT YOU FROM MEDICAL APPOINTMENTS OR FROM GETTING MEDICATIONS?: NO

## 2023-10-26 SDOH — HEALTH STABILITY: PHYSICAL HEALTH: ON AVERAGE, HOW MANY DAYS PER WEEK DO YOU ENGAGE IN MODERATE TO STRENUOUS EXERCISE (LIKE A BRISK WALK)?: 0 DAYS

## 2023-10-26 SDOH — SOCIAL STABILITY: SOCIAL NETWORK: IN A TYPICAL WEEK, HOW MANY TIMES DO YOU TALK ON THE PHONE WITH FAMILY, FRIENDS, OR NEIGHBORS?: PATIENT DECLINED

## 2023-10-26 SDOH — SOCIAL STABILITY: SOCIAL NETWORK: HOW OFTEN DO YOU ATTENT MEETINGS OF THE CLUB OR ORGANIZATION YOU BELONG TO?: PATIENT DECLINED

## 2023-10-26 SDOH — SOCIAL STABILITY: SOCIAL NETWORK: HOW OFTEN DO YOU ATTEND CHURCH OR RELIGIOUS SERVICES?: PATIENT DECLINED

## 2023-10-26 SDOH — SOCIAL STABILITY: SOCIAL INSECURITY: HAVE YOU HAD THOUGHTS OF HARMING ANYONE ELSE?: NO

## 2023-10-26 SDOH — SOCIAL STABILITY: SOCIAL INSECURITY: DOES ANYONE TRY TO KEEP YOU FROM HAVING/CONTACTING OTHER FRIENDS OR DOING THINGS OUTSIDE YOUR HOME?: NO

## 2023-10-26 SDOH — ECONOMIC STABILITY: FOOD INSECURITY: WITHIN THE PAST 12 MONTHS, THE FOOD YOU BOUGHT JUST DIDN'T LAST AND YOU DIDN'T HAVE MONEY TO GET MORE.: NEVER TRUE

## 2023-10-26 SDOH — ECONOMIC STABILITY: INCOME INSECURITY: IN THE LAST 12 MONTHS, WAS THERE A TIME WHEN YOU WERE NOT ABLE TO PAY THE MORTGAGE OR RENT ON TIME?: NO

## 2023-10-26 SDOH — SOCIAL STABILITY: SOCIAL NETWORK
DO YOU BELONG TO ANY CLUBS OR ORGANIZATIONS SUCH AS CHURCH GROUPS UNIONS, FRATERNAL OR ATHLETIC GROUPS, OR SCHOOL GROUPS?: PATIENT DECLINED

## 2023-10-26 SDOH — HEALTH STABILITY: PHYSICAL HEALTH: ON AVERAGE, HOW MANY MINUTES DO YOU ENGAGE IN EXERCISE AT THIS LEVEL?: 0 MIN

## 2023-10-26 SDOH — SOCIAL STABILITY: SOCIAL INSECURITY: ABUSE: ADULT

## 2023-10-26 SDOH — SOCIAL STABILITY: SOCIAL INSECURITY: DO YOU FEEL ANYONE HAS EXPLOITED OR TAKEN ADVANTAGE OF YOU FINANCIALLY OR OF YOUR PERSONAL PROPERTY?: NO

## 2023-10-26 SDOH — SOCIAL STABILITY: SOCIAL INSECURITY: HAS ANYONE EVER THREATENED TO HURT YOUR FAMILY OR YOUR PETS?: NO

## 2023-10-26 SDOH — ECONOMIC STABILITY: HOUSING INSECURITY: IN THE LAST 12 MONTHS, HOW MANY PLACES HAVE YOU LIVED?: 1

## 2023-10-26 SDOH — ECONOMIC STABILITY: INCOME INSECURITY: HOW HARD IS IT FOR YOU TO PAY FOR THE VERY BASICS LIKE FOOD, HOUSING, MEDICAL CARE, AND HEATING?: NOT HARD AT ALL

## 2023-10-26 ASSESSMENT — ACTIVITIES OF DAILY LIVING (ADL)
WALKS IN HOME: INDEPENDENT
DRESSING YOURSELF: INDEPENDENT
HEARING - RIGHT EAR: FUNCTIONAL
ADL_ASSISTANCE: INDEPENDENT
GROOMING: INDEPENDENT
ADEQUATE_TO_COMPLETE_ADL: NO
HEARING - RIGHT EAR: FUNCTIONAL
BATHING_ASSISTANCE: MINIMAL
FEEDING YOURSELF: INDEPENDENT
JUDGMENT_ADEQUATE_SAFELY_COMPLETE_DAILY_ACTIVITIES: NO
DRESSING YOURSELF: INDEPENDENT
JUDGMENT_ADEQUATE_SAFELY_COMPLETE_DAILY_ACTIVITIES: NO
BATHING: INDEPENDENT
WALKS IN HOME: INDEPENDENT
LACK_OF_TRANSPORTATION: NO
FEEDING YOURSELF: INDEPENDENT
GROOMING: INDEPENDENT
TOILETING: INDEPENDENT
PATIENT'S MEMORY ADEQUATE TO SAFELY COMPLETE DAILY ACTIVITIES?: NO
HEARING - LEFT EAR: FUNCTIONAL
ADEQUATE_TO_COMPLETE_ADL: NO
ASSISTIVE_DEVICE: EYEGLASSES
LACK_OF_TRANSPORTATION: NO
ASSISTIVE_DEVICE: EYEGLASSES
BATHING: INDEPENDENT
TOILETING: INDEPENDENT
HEARING - LEFT EAR: FUNCTIONAL
PATIENT'S MEMORY ADEQUATE TO SAFELY COMPLETE DAILY ACTIVITIES?: NO

## 2023-10-26 ASSESSMENT — PAIN - FUNCTIONAL ASSESSMENT
PAIN_FUNCTIONAL_ASSESSMENT: 0-10

## 2023-10-26 ASSESSMENT — PATIENT HEALTH QUESTIONNAIRE - PHQ9
SUM OF ALL RESPONSES TO PHQ9 QUESTIONS 1 & 2: 0
2. FEELING DOWN, DEPRESSED OR HOPELESS: NOT AT ALL
1. LITTLE INTEREST OR PLEASURE IN DOING THINGS: NOT AT ALL

## 2023-10-26 ASSESSMENT — LIFESTYLE VARIABLES
HOW OFTEN DO YOU HAVE A DRINK CONTAINING ALCOHOL: NEVER
HOW OFTEN DO YOU HAVE 6 OR MORE DRINKS ON ONE OCCASION: NEVER
AUDIT-C TOTAL SCORE: 0
SKIP TO QUESTIONS 9-10: 1
SUBSTANCE_ABUSE_PAST_12_MONTHS: NO
PRESCIPTION_ABUSE_PAST_12_MONTHS: NO
AUDIT-C TOTAL SCORE: 0
HOW MANY STANDARD DRINKS CONTAINING ALCOHOL DO YOU HAVE ON A TYPICAL DAY: PATIENT DOES NOT DRINK

## 2023-10-26 ASSESSMENT — ENCOUNTER SYMPTOMS
NEUROLOGICAL NEGATIVE: 1
MUSCULOSKELETAL NEGATIVE: 1
EYES NEGATIVE: 1
ENDOCRINE NEGATIVE: 1
TROUBLE SWALLOWING: 1
CONSTITUTIONAL NEGATIVE: 1
RESPIRATORY NEGATIVE: 1
GASTROINTESTINAL NEGATIVE: 1
CARDIOVASCULAR NEGATIVE: 1
PSYCHIATRIC NEGATIVE: 1
ALLERGIC/IMMUNOLOGIC NEGATIVE: 1
HEMATOLOGIC/LYMPHATIC NEGATIVE: 1

## 2023-10-26 ASSESSMENT — COGNITIVE AND FUNCTIONAL STATUS - GENERAL
STANDING UP FROM CHAIR USING ARMS: A LITTLE
WALKING IN HOSPITAL ROOM: A LITTLE
DAILY ACTIVITIY SCORE: 24
DAILY ACTIVITIY SCORE: 19
DRESSING REGULAR LOWER BODY CLOTHING: A LITTLE
PERSONAL GROOMING: A LITTLE
MOVING TO AND FROM BED TO CHAIR: A LITTLE
MOBILITY SCORE: 24
PATIENT BASELINE BEDBOUND: NO
MOBILITY SCORE: 24
DAILY ACTIVITIY SCORE: 24
MOBILITY SCORE: 20
TOILETING: A LITTLE
DRESSING REGULAR UPPER BODY CLOTHING: A LITTLE
CLIMB 3 TO 5 STEPS WITH RAILING: A LITTLE
HELP NEEDED FOR BATHING: A LITTLE

## 2023-10-26 ASSESSMENT — PAIN SCALES - GENERAL
PAINLEVEL_OUTOF10: 0 - NO PAIN
PAINLEVEL_OUTOF10: 0 - NO PAIN

## 2023-10-26 NOTE — CONSULTS
Inpatient consult to Gastroenterology  Consult performed by: Mary Bartlett, KRISTA-CNP  Consult ordered by: Shawn Church MD          Reason For Consult  Dysphagia    History Of Present Illness  Annie Joseph is a 68 y.o. female presenting with left-sided chest pain that radiates to her back, shoulder and left side of her neck. Has history of CAD with a heart attack, 2 stents placed in December 2022.  On Plavix, however was held for 6 days due to a dental procedure recently . Cardiac work-up was noted and negative. We were consulted for dysphagia, with trouble swallowing solid food, with feeling like the food gets stuck. Pt has a hx of hiatal hernia, GERD last EGD 1/2021 showed a 7cm HH. Pt is currently on PPI therapy once daily.  Esophagram in 5/23/2022 showed moderate mid to distal esophageal dysmotility with stasis of oral contrast in the esophagus in the prone position. There is prominence of the cricopharyngeal muscle on the swallowing phase of the exam. She denies any abdominal pain, nausea, vomiting. Reports normal BM's no melena/hematochezia. She also has a history of IBS-C colonoscopy in 2020 showed , 2 polyps removed, diverticulosis present, inflamed rectal mass bx- mucosal prolapse polyp.     Past Medical History  She has a past medical history of Encounter for full-term uncomplicated delivery, Fibromyalgia (06/22/2022), Obstructive sleep apnea (adult) (pediatric), Other chronic pain, Other conditions influencing health status, Other symptoms and signs involving the nervous system (04/20/2021), Personal history of diseases of the blood and blood-forming organs and certain disorders involving the immune mechanism, Personal history of other diseases of the circulatory system (01/05/2020), Personal history of other diseases of the musculoskeletal system and connective tissue (10/09/2019), Personal history of other diseases of the musculoskeletal system and connective tissue (01/05/2020), Personal  history of other diseases of the musculoskeletal system and connective tissue, Personal history of other diseases of the nervous system and sense organs, Personal history of other endocrine, nutritional and metabolic disease, Personal history of other infectious and parasitic diseases, Rectocele, and Spontaneous ecchymoses.    Surgical History  She has a past surgical history that includes Other surgical history (09/04/2015); Other surgical history (09/04/2015); and Dilation and curettage of uterus (09/04/2015).     Social History  She reports that she has never smoked. She has never used smokeless tobacco. She reports that she does not currently use alcohol. She reports that she does not currently use drugs.    Family History  Family History   Problem Relation Name Age of Onset    Diabetes Mother      Fibromyalgia Mother      Other (MI) Mother      Other (cerebrovascular accident) Father      Heart attack Father      Other (anxiety and depression) Sister      Other (anxiety and depression) Brother      Diabetes Brother      Other (MI) Brother      Breast cancer Maternal Grandmother      Other (cardiac disorder) Other      Other (htn) Other      Diabetes Other aunt     Other (MI) Other GM     Other (MI [Other]) Other GF         Allergies  Atorvastatin, Clindamycin, Levaquin [levofloxacin], and Sulfa (sulfonamide antibiotics)    Review of Systems   Constitutional: Negative.    HENT:  Positive for trouble swallowing.    Eyes: Negative.    Respiratory: Negative.     Cardiovascular: Negative.    Gastrointestinal: Negative.    Endocrine: Negative.    Genitourinary: Negative.    Musculoskeletal: Negative.    Skin: Negative.    Allergic/Immunologic: Negative.    Neurological: Negative.    Hematological: Negative.    Psychiatric/Behavioral: Negative.          Physical Exam  HENT:      Head: Normocephalic.      Nose: Nose normal.      Mouth/Throat:      Mouth: Mucous membranes are moist.   Eyes:      Pupils: Pupils are  "equal, round, and reactive to light.   Cardiovascular:      Rate and Rhythm: Normal rate.   Pulmonary:      Effort: Pulmonary effort is normal.   Abdominal:      General: Abdomen is flat.   Musculoskeletal:         General: Normal range of motion.      Cervical back: Normal range of motion.   Skin:     General: Skin is warm.   Neurological:      General: No focal deficit present.      Mental Status: She is alert.   Psychiatric:         Mood and Affect: Mood normal.          Last Recorded Vitals  Blood pressure 158/88, pulse 94, temperature 36.6 °C (97.9 °F), temperature source Temporal, resp. rate 19, height 1.575 m (5' 2\"), weight 85.3 kg (188 lb), SpO2 93 %.    Relevant Results  CT chest wo IV contrast    Result Date: 10/25/2023  Interpreted By:  Paris Mar, STUDY: CT CHEST WO IV CONTRAST 10/25/2023 10:54 pm   INDICATION: Chest pain   COMPARISON: 12/02/2022   ACCESSION NUMBER(S): [id number]   ORDERING CLINICIAN: ROSI PRADHAN   TECHNIQUE: Helical unenhanced scans are obtained from the thoracic inlet through the upper abdomen with sagittal and coronal reformations completed by the technologist at the acquisition scanner.   FINDINGS: There is no aneurysm of the thoracic aorta. Pulmonary arteries normal caliber. There is no mediastinal or hilar adenopathy or mass. Coronary artery calcifications are seen. The cardiac size is normal without pericardial abnormality. There is however a moderate-sized hiatal hernia.   A 6 mm intrapulmonary lymph node is seen along the right major fissure and is unchanged. No pulmonary infiltrate or airspace consolidation is seen.   There is a stable 1.3 cm in diameter exophytic cyst arising from the left hepatic lobe.       No acute abnormality demonstrated.   Moderate-sized hiatal hernia.   Coronary artery calcifications.   Stable 6 mm intrapulmonary lymph node along right major fissure.   Stable exophytic 1.3 cm cyst arising from left hepatic lobe.   Signed by: Paris Mar 10/25/2023 " 11:33 PM Dictation workstation:   [id number]    XR chest 2 views    Result Date: 10/25/2023  Interpreted By:  Paris Mar, STUDY: XR CHEST 2 VIEWS 10/25/2023 8:32 pm   INDICATION: Signs/Symptoms:Chest pain   COMPARISON: None available.   ACCESSION NUMBER(S): [id number]   ORDERING CLINICIAN: LEX THAPA   TECHNIQUE: PA and lateral views of the chest were acquired.   FINDINGS: Normal heart, mediastinum, and lungs. Bony thorax appears intact. Gallbladder is surgically absent.       Normal chest.   Signed by: Paris Mar 10/25/2023 8:48 PM Dictation workstation:   [id number]    Scheduled medications  amLODIPine, 5 mg, oral, Daily  aspirin, 81 mg, oral, Daily  clopidogrel, 75 mg, oral, Daily  DULoxetine, 60 mg, oral, Daily  enoxaparin, 40 mg, subcutaneous, q24h  iron sucrose, 200 mg, intravenous, Daily  isosorbide mononitrate ER, 30 mg, oral, BID after meals  loratadine, 10 mg, oral, Daily  magnesium oxide, 400 mg, oral, Daily  pantoprazole, 40 mg, oral, Daily      Continuous medications     PRN medications  PRN medications: acetaminophen **OR** acetaminophen **OR** acetaminophen, ondansetron **OR** ondansetron, polyethylene glycol  Results for orders placed or performed during the hospital encounter of 10/25/23 (from the past 24 hour(s))   CBC and Auto Differential   Result Value Ref Range    WBC 9.2 4.4 - 11.3 x10*3/uL    nRBC 0.0 0.0 - 0.0 /100 WBCs    RBC 4.32 4.00 - 5.20 x10*6/uL    Hemoglobin 8.9 (L) 12.0 - 16.0 g/dL    Hematocrit 29.9 (L) 36.0 - 46.0 %    MCV 69 (L) 80 - 100 fL    MCH 20.6 (L) 26.0 - 34.0 pg    MCHC 29.8 (L) 32.0 - 36.0 g/dL    RDW 18.9 (H) 11.5 - 14.5 %    Platelets 398 150 - 450 x10*3/uL    MPV 10.1 7.5 - 11.5 fL    Neutrophils % 49.3 40.0 - 80.0 %    Immature Granulocytes %, Automated 0.2 0.0 - 0.9 %    Lymphocytes % 35.3 13.0 - 44.0 %    Monocytes % 8.7 2.0 - 10.0 %    Eosinophils % 5.8 0.0 - 6.0 %    Basophils % 0.7 0.0 - 2.0 %    Neutrophils Absolute 4.54 1.20 - 7.70 x10*3/uL     Immature Granulocytes Absolute, Automated 0.02 0.00 - 0.70 x10*3/uL    Lymphocytes Absolute 3.24 1.20 - 4.80 x10*3/uL    Monocytes Absolute 0.80 0.10 - 1.00 x10*3/uL    Eosinophils Absolute 0.53 0.00 - 0.70 x10*3/uL    Basophils Absolute 0.06 0.00 - 0.10 x10*3/uL   Basic metabolic panel   Result Value Ref Range    Glucose 135 (H) 65 - 99 mg/dL    Sodium 137 133 - 145 mmol/L    Potassium 3.8 3.4 - 5.1 mmol/L    Chloride 103 97 - 107 mmol/L    Bicarbonate 24 24 - 31 mmol/L    Urea Nitrogen 26 (H) 8 - 25 mg/dL    Creatinine 1.50 0.40 - 1.60 mg/dL    eGFR 38 (L) >60 mL/min/1.73m*2    Calcium 8.7 8.5 - 10.4 mg/dL    Anion Gap 10 <=19 mmol/L   Serial Troponin, Initial ([facility])   Result Value Ref Range    Troponin T, High Sensitivity 14 <=15 ng/L   Serial Troponin, 2 Hour ([facility])   Result Value Ref Range    Troponin T, High Sensitivity 14 <=15 ng/L   Creatinine, Serum   Result Value Ref Range    Creatinine 1.40 0.40 - 1.60 mg/dL    eGFR 41 (L) >60 mL/min/1.73m*2   Serial Troponin, 6 Hour ([facility])   Result Value Ref Range    Troponin T, High Sensitivity 14 <=15 ng/L   Iron and TIBC   Result Value Ref Range    Iron <20 (L) 30 - 160 ug/dL    UIBC 391 (H) 110 - 370 ug/dL    TIBC      % Saturation     Ferritin   Result Value Ref Range    Ferritin 7 (L) 13 - 150 ng/mL   Hemoglobin A1c   Result Value Ref Range    Hemoglobin A1C 5.4 See below %    Estimated Average Glucose 108 Not Established mg/dL   Lipid panel   Result Value Ref Range    Cholesterol 188 133 - 200 mg/dL    HDL-Cholesterol 57.0 >50.0 mg/dL    Cholesterol/HDL Ratio 3.3 SEE COMMENT    LDL Calculated 116 65 - 130 mg/dL    Triglycerides 74 40 - 150 mg/dL   CBC   Result Value Ref Range    WBC 8.6 4.4 - 11.3 x10*3/uL    nRBC 0.0 0.0 - 0.0 /100 WBCs    RBC 4.57 4.00 - 5.20 x10*6/uL    Hemoglobin 9.3 (L) 12.0 - 16.0 g/dL    Hematocrit 31.9 (L) 36.0 - 46.0 %    MCV 70 (L) 80 - 100 fL    MCH 20.4 (L) 26.0 - 34.0 pg    MCHC 29.2 (L) 32.0 - 36.0 g/dL    RDW 19.2 (H) 11.5 - 14.5 %     Platelets 402 150 - 450 x10*3/uL    MPV 10.1 7.5 - 11.5 fL   Basic metabolic panel   Result Value Ref Range    Glucose 103 (H) 65 - 99 mg/dL    Sodium 138 133 - 145 mmol/L    Potassium 4.2 3.4 - 5.1 mmol/L    Chloride 103 97 - 107 mmol/L    Bicarbonate 24 24 - 31 mmol/L    Urea Nitrogen 21 8 - 25 mg/dL    Creatinine 1.40 0.40 - 1.60 mg/dL    eGFR 41 (L) >60 mL/min/1.73m*2    Calcium 8.8 8.5 - 10.4 mg/dL    Anion Gap 11 <=19 mmol/L        Assessment/Plan     Dysphagia   Increased difficulty in swallowing. 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   Also noted 7 cm Hiatal hernia noted on previous EGD which can contribute to symptoms  Continue PPI   Patient remains stable no urgent need for inpatient scope.   Patient should follow-up with ENT or tertiary downtown for possible cricopharyngeal myotomyin an outpatient setting     Mary Bartlett, APRN-CNP

## 2023-10-26 NOTE — H&P
History Of Present Illness      Annie Joseph is a 68 y.o. female presenting with Chest Pain.        The patient is a 68-year-old female presenting to the emergency department for evaluation of substernal and left-sided chest pain that radiates to her back, shoulder and left side of her neck.  She states that she has had worsening symptoms over the past several days and it was acutely worse today.  No specific better or worse.  It is a constant aching pain.  She denies any significant shortness of breath.  No palpitations.  No diaphoresis.  No fever or chills.  No cough or congestion.  No recent injury or trauma.  No abdominal pain.  No nausea vomiting.  No diarrhea or constipation no urinary complaints.  She states that she does have a history of CAD with a heart attack in December 2022.  She did receive 2 stents at that time.  Her cardiologist is Dr. Parisi.         EKG with sinus rhythm at 77 bpm, frequent PVCs, normal axis, normal voltage, normal ST segment, and normal T waves        Oral aspirin ordered by the triage provider.  Also ordered IV morphine, IV Zofran and IV fluids.       The patient's ED diagnostic work-up was noted for normal WBC count 9.2.  The H&H was slightly low at 8.9/29.9.  Patient's platelet count was normal at 398.  Her blood chemistry was within normal limits.  Slightly elevated glucose of135.  Chest x-ray was obtained and read as normal chest.  A CT chest without IV contrast was obtained.  This shows no acute abnormality demonstrated.  Moderate sized hiatal hernia.  Toni artery calcifications.  Stable 6 mm intrapulmonary lymph node along the right major fissure.  Stable exophytic 1.3 cm cyst arising from the left hepatic lobe.       Initial Troponin T 14. Repeat trop T 14. Delta trop T 0          Patient does not have any acute process on CT chest and/or chest x-ray.  No evidence of pneumonia or dissection.  There is no evidence of ischemia on EKG or cardiac enzymes with the  patient does have a heart score of 4.        The case was discussed with Dr. Parisi, the patient's cardiologist.  He did recommend mission by the medicine team for further management of her current symptoms and trending of her cardiac enzymes given that she does have a heart score of 4.      The patient tells me today that she was at dentist office earlier and received an extensive cleaning and crown placement that took several hours.  She was told by the dental office to hold her Plavix for several days prior to this appointment.  This being the fact that even extensive surgical work was not conducted.  She also tells me that she exhibited severe myalgias with her atorvastatin and this has been held by cardiology.  She states that she wears BiPAP device at night for ROBERT but is unaware of her 6.  Recently she has been having trouble with dysphagia.  He had trouble swallowing her sandwich for dinner.  Followed up with GI over 3 years ago had an endoscopy and colonoscopy.         Past Medical History        Past Medical History:   Diagnosis Date    Encounter for full-term uncomplicated delivery      (spontaneous vaginal delivery)    Fibromyalgia 2022    Fibromyalgia    Obstructive sleep apnea (adult) (pediatric)     Obstructive sleep apnea    Other chronic pain     Chronic pain    Other conditions influencing health status     Cystocele    Other symptoms and signs involving the nervous system 2021    Suspected sleep apnea    Personal history of diseases of the blood and blood-forming organs and certain disorders involving the immune mechanism     History of anemia    Personal history of other diseases of the circulatory system 2020    History of hypertension    Personal history of other diseases of the musculoskeletal system and connective tissue 10/09/2019    History of fibromyositis    Personal history of other diseases of the musculoskeletal system and connective tissue 2020     History of arthritis    Personal history of other diseases of the musculoskeletal system and connective tissue     History of arthritis    Personal history of other diseases of the nervous system and sense organs     History of sleep disturbance    Personal history of other endocrine, nutritional and metabolic disease     History of hypothyroidism    Personal history of other infectious and parasitic diseases     History of varicella    Rectocele     Rectocele    Spontaneous ecchymoses     Bruises easily     Cystocele  Hypertension  CAD status post PCI with 2 stents  (Mid Left anterior descending artery)  Chronic gastritis  Left renal mass  Fibromyalgia  ROBERT  GERD  STEMI        Surgical History  Past Surgical History:   Procedure Laterality Date    DILATION AND CURETTAGE OF UTERUS  09/04/2015    Dilation And Curettage    OTHER SURGICAL HISTORY  09/04/2015    Cholecystotomy    OTHER SURGICAL HISTORY  09/04/2015    Breast Surgery Puncture Aspiration Of Cyst       Left radical nephrectomy  Cholecystectomy 2010  D&C  Right breast biopsy  [facility] with stent placement 12/3/2022        Social History        She reports that she has never smoked. She has never used smokeless tobacco. She reports that she does not currently use alcohol. She reports that she does not currently use drugs.    Family History      Family History   Problem Relation Name Age of Onset    Diabetes Mother      Fibromyalgia Mother      Other (MI) Mother      Other (cerebrovascular accident) Father      Heart attack Father      Other (anxiety and depression) Sister      Other (anxiety and depression) Brother      Diabetes Brother      Other (MI) Brother      Breast cancer Maternal Grandmother      Other (cardiac disorder) Other      Other (htn) Other      Diabetes Other aunt     Other (MI) Other GM     Other (MI [Other]) Other GF         Allergies        Clindamycin, Levaquin [levofloxacin], and Sulfa (sulfonamide antibiotics)        Review of  "Systems      General: No change in weight. No weakenss. No Fevers/Chills/Night Sweats   Skin: No skin/hair/nail changes. No rashes or sores.  Head:  No trauma. No Headache/nasuea/vomitting.   Eyes: No visual changes. No tearing. No itching.   Ears: No hearing loss. No tinnitus. No vertigo. No discharge.  Nose, Sinuses: No rhinorrhea, No nasal congestion. No epistaxis.  Mouth, Throat, Neck: No bleeding gums, hoarseness, sore throat or swollen neck  Cardiac: No palpitations. No JACOBSON. No PND. No Orthopnea.   Respiratory: No Shortness of Breath. No wheezing. No cough. No hemoptysis.   GI: No nausea/vomiting. No indigestion. No diarrhea. No constipation.   Extremities: No numbness or tingling. No paresthesias.   Urinary: No change in urinary frequency. No change in hesitancy. No hematuria. No incontinence.       Physical Exam         Constitutional:  Pleasant  Eyes: PERRL, EOMI,   ENMT: mucous membranes moist  Head/Neck: Neck supple, No JVD,   Respiratory/Thorax: Patent airways, CTAB,   Cardiovascular: Regular, rate and rhythm, no murmurs  Gastrointestinal: Soft, non-distended, +BS.  Musculoskeletal: ROM intact, no joint swelling, normal strength  Extremities: peripheral pulses intact; no edema  Neurological: Alert and Oriented x 3; no focal deficits; gross motor and sensation intact; CN II-XII intact. No asterixis.  Psychological: Appropriate mood and behavior  Skin: Warm and dry, no lesions, no rashes.    Last Recorded Vitals        Blood pressure 145/78, pulse 76, temperature 36.6 °C (97.9 °F), temperature source Temporal, resp. rate 18, height 1.575 m (5' 2\"), weight 85.3 kg (188 lb), SpO2 95 %.    Relevant Results                   Assessment/Plan   Principal Problem:    Chest pain  Active Problems:    Fibromyalgia    Hiatal hernia with GERD    ROBERT (obstructive sleep apnea)    Coronary artery disease involving native coronary artery of native heart without angina pectoris          Annie Joseph is a 68 y.o. female " presenting with Chest Pain.  Patient admitted for further evaluation and management.        1) Chest Pain -  Atypical (Musculoskeletal) versus Non-anginal (GERD):      Admit patient to Telemetry service   Continuous Cardiac Monitor and BP Monitor Placement   Cardiology evaluation in AM.   Cardiac enzymes are negative (x 1 set) for acute MI.   Follow up second set CE (Tropinin + CK)   Monitor Electrolytes, Keep K+>4 + Mg++>2.   Will keep patient NPO after midnight  Defer 2D-Echocardiography to evaluate for LVEF, Regional Wall motion abnormalities, and any Valvular defects to Cardiology  Obtain Hgba1c and Lipid Profile in AM  Continue home Imdur dose.  Cardiology to consider Ranexa.        2) Suspected Iron Deficiency Anemia:      FOBT orderedfor occult blood  Follow up Anemia Work-up and labs  (Iron Profile, Ferritin, Transferrin, Retic Count, Vit. B12 + Folate)  Consider GI Evaluation if guaiac positive        3) H/o CAD s/p PCI X 2 DEBORAH/ROBERT:      Continue with DAPT Therapy  Plavix restarted   Patient follows w/ Dr. NANCIE Parisi  Alternate to Atorvastatin as per Cardiology   Patient wears BIPAP QHS        4) Dysphagia/Moderate Hiatal Hernia/Fibromyalgia/HTN:      Appears to be getting worse and more frequent  Will consult GI to consider EGD and further assess for Dysphagia symptoms   Continue with PPI  Possible Barium Swallow  and referral to Minimally Invasive Surgery referral per GI service  Continue with SNRI Home dose   Continue with home amlodipine 5 mg p.o. daily  Continue home Imdur dose      5) GI + DVT PPX:      Home PPI dose  Lovenox subcu (Monitor H/H Closely)                 This Dictation was Transcribed using a Nuance Dragon Voice Recognition System Device (with Compatible Computer + Software) and as such may contain Grammatical Errors and Unintentional Typing Misprints.            I spent 31 minutes in the professional and overall care of this patient.      Shawn Church MD/MPH

## 2023-10-26 NOTE — PROGRESS NOTES
Annie Joseph is a 68 y.o. female on day 0 of admission presenting with Chest pain.      Subjective   Patient seen and examined.  Patient is alert oriented x3, currently denies any chest pain or shortness of breath, and hoping to be discharged home today.       Objective     Last Recorded Vitals  /60 (BP Location: Right arm, Patient Position: Lying)   Pulse 77   Temp 37 °C (98.6 °F) (Oral)   Resp 18   Wt 85.3 kg (188 lb)   SpO2 96%   Intake/Output last 3 Shifts:  No intake or output data in the 24 hours ending 10/26/23 1600    Admission Weight  Weight: 85.3 kg (188 lb) (10/25/23 1957)    Daily Weight  10/25/23 : 85.3 kg (188 lb)    Image Results  CT chest wo IV contrast  Narrative: Interpreted By:  Paris Mar,   STUDY:  CT CHEST WO IV CONTRAST 10/25/2023 10:54 pm      INDICATION:  Chest pain      COMPARISON:  12/02/2022      ACCESSION NUMBER(S):  [id number]      ORDERING CLINICIAN:  ROSI PRADHAN      TECHNIQUE:  Helical unenhanced scans are obtained from the thoracic inlet through  the upper abdomen with sagittal and coronal reformations completed by  the technologist at the acquisition scanner.      FINDINGS:  There is no aneurysm of the thoracic aorta. Pulmonary arteries normal  caliber. There is no mediastinal or hilar adenopathy or mass.  Coronary artery calcifications are seen. The cardiac size is normal  without pericardial abnormality. There is however a moderate-sized  hiatal hernia.      A 6 mm intrapulmonary lymph node is seen along the right major  fissure and is unchanged. No pulmonary infiltrate or airspace  consolidation is seen.      There is a stable 1.3 cm in diameter exophytic cyst arising from the  left hepatic lobe.      Impression: No acute abnormality demonstrated.      Moderate-sized hiatal hernia.      Coronary artery calcifications.      Stable 6 mm intrapulmonary lymph node along right major fissure.      Stable exophytic 1.3 cm cyst arising from left hepatic lobe.       Signed by: Paris Mar 10/25/2023 11:33 PM  Dictation workstation:   [id number]  XR chest 2 views  Narrative: Interpreted By:  Paris Mar,   STUDY:  XR CHEST 2 VIEWS 10/25/2023 8:32 pm      INDICATION:  Signs/Symptoms:Chest pain      COMPARISON:  None available.      ACCESSION NUMBER(S):  [id number]      ORDERING CLINICIAN:  LEX THAPA      TECHNIQUE:  PA and lateral views of the chest were acquired.      FINDINGS:  Normal heart, mediastinum, and lungs. Bony thorax appears intact.  Gallbladder is surgically absent.      Impression: Normal chest.      Signed by: Paris Mar 10/25/2023 8:48 PM  Dictation workstation:   [id number]      Physical Exam  HENT:      Head: Normocephalic and atraumatic.      Mouth/Throat:      Mouth: Mucous membranes are moist.   Eyes:      Extraocular Movements: Extraocular movements intact.   Cardiovascular:      Rate and Rhythm: Regular rhythm.   Pulmonary:      Effort: Pulmonary effort is normal.   Abdominal:      General: Bowel sounds are normal.   Musculoskeletal:      Cervical back: Neck supple.   Skin:     General: Skin is warm and dry.   Neurological:      Mental Status: She is alert and oriented to person, place, and time.   Psychiatric:         Mood and Affect: Mood normal.         Relevant Results               Assessment/Plan   This patient currently has cardiac telemetry ordered; if you would like to modify or discontinue the telemetry order, click here to go to the orders activity to modify/discontinue the order.              Principal Problem:    Chest pain  Active Problems:    Fibromyalgia    Hiatal hernia with GERD    ROBERT (obstructive sleep apnea)    Coronary artery disease involving native coronary artery of native heart without angina pectoris    1) Chest Pain -  Atypical (Musculoskeletal) versus Non-anginal (GERD):        Admit patient to Telemetry service   Continuous Cardiac Monitor and BP Monitor Placement   Cardiology evaluation pending.   Cardiac enzymes  with flat/negative pattern for acute MI.   Follow up second set CE (Tropinin + CK)   Monitor Electrolytes, Keep K+>4 + Mg++>2.   Defer 2D-Echocardiography to evaluate for LVEF, Regional Wall motion abnormalities, and any Valvular defects to Cardiology  Obtain Hgba1c and Lipid Profile in AM  Continue home Imdur dose.  Cardiology to consider Ranexa.           2) Suspected Iron Deficiency Anemia:        FOBT orderedfor occult blood  Follow up Anemia Work-up and labs  (Iron Profile, Ferritin, Transferrin, Retic Count, Vit. B12 + Folate)  Consider GI Evaluation if guaiac positive           3) H/o CAD s/p PCI X 2 DEBORAH/ROBERT:        Continue with DAPT Therapy  Plavix restarted   Patient follows w/ Dr. NANCIE Parisi  Alternate to Atorvastatin as per Cardiology   Patient wears BIPAP QHS           4) Dysphagia/Moderate Hiatal Hernia/Fibromyalgia/HTN:        Appears to be getting worse and more frequent  Will consult GI to consider EGD and further assess for Dysphagia symptoms - > GI has seen and evaluated patient and recommending outpatient follow-up, no immediate intervention recommended.  Continue with PPI  Continue with SNRI Home dose   Continue with home amlodipine 5 mg p.o. daily  Continue home Imdur dose        5) GI + DVT PPX:        Home PPI dose  Lovenox subcu (Monitor H/H Closely)        Anticipated discharge in the next 24 hours, after cardiology evaluation.            Jory Lyons, APRN-CNP

## 2023-10-26 NOTE — ED PROVIDER NOTES
HPI   Chief Complaint   Patient presents with    Chest Pain       Is a 68-year-old female who presents to the emergency department for chest pain.  Patient states that the pain started around 6 PM.  Patient states she was at a [employer] event when she started to experience pain right in between her scapula.  The pain radiated around her left side into the left front of her chest.  Patient states the pain then went down her left arm and slightly upper left side of her neck.  Patient took nitroglycerin when she got home.  Patient then repeated a second dose of nitroglycerin because of prefers anticoagulant and take her pain away.  Patient then came to the emergency room because the pain was not going away after 2 nitroglycerin.  Patient took 1 baby aspirin on the way to the ER.  Patient dates that the pain lasted until she received the morphine in ED.  Patient does have a history of coronary artery disease.  She had a MI last year and had 2 stents placed.  Patient also has hyperlipidemia and hypertension.  She says that occasionally over the last year she will have episodes of chest pain but that typically resolves with nitroglycerin x1.  Patient states that she came to the ER because the nitroglycerin was not working this time.           Please see HPI for pertinent positive and negative ROS. The remaining 10 point ROS negative.                  No data recorded                Patient History   Past Medical History:   Diagnosis Date    Encounter for full-term uncomplicated delivery      (spontaneous vaginal delivery)    Fibromyalgia 2022    Fibromyalgia    Obstructive sleep apnea (adult) (pediatric)     Obstructive sleep apnea    Other chronic pain     Chronic pain    Other conditions influencing health status     Cystocele    Other symptoms and signs involving the nervous system 2021    Suspected sleep apnea    Personal history of diseases of the blood and blood-forming organs and certain disorders  involving the immune mechanism     History of anemia    Personal history of other diseases of the circulatory system 01/05/2020    History of hypertension    Personal history of other diseases of the musculoskeletal system and connective tissue 10/09/2019    History of fibromyositis    Personal history of other diseases of the musculoskeletal system and connective tissue 01/05/2020    History of arthritis    Personal history of other diseases of the musculoskeletal system and connective tissue     History of arthritis    Personal history of other diseases of the nervous system and sense organs     History of sleep disturbance    Personal history of other endocrine, nutritional and metabolic disease     History of hypothyroidism    Personal history of other infectious and parasitic diseases     History of varicella    Rectocele     Rectocele    Spontaneous ecchymoses     Bruises easily     Past Surgical History:   Procedure Laterality Date    DILATION AND CURETTAGE OF UTERUS  09/04/2015    Dilation And Curettage    OTHER SURGICAL HISTORY  09/04/2015    Cholecystotomy    OTHER SURGICAL HISTORY  09/04/2015    Breast Surgery Puncture Aspiration Of Cyst     Family History   Problem Relation Name Age of Onset    Diabetes Mother      Fibromyalgia Mother      Other (MI) Mother      Other (cerebrovascular accident) Father      Heart attack Father      Other (anxiety and depression) Sister      Other (anxiety and depression) Brother      Diabetes Brother      Other (MI) Brother      Breast cancer Maternal Grandmother      Other (cardiac disorder) Other      Other (htn) Other      Diabetes Other aunt     Other (MI) Other GM     Other (MI [Other]) Other GF      Social History     Tobacco Use    Smoking status: Never    Smokeless tobacco: Never   Vaping Use    Vaping Use: Never used   Substance Use Topics    Alcohol use: Not Currently    Drug use: Not Currently       Physical Exam   ED Triage Vitals [10/25/23 1957]   Temp Heart  Rate Resp BP   36.6 °C (97.9 °F) 80 16 161/86      SpO2 Temp Source Heart Rate Source Patient Position   100 % Temporal Monitor --      BP Location FiO2 (%)     -- --       Physical Exam  GENERAL APPEARANCE: Awake and alert. No acute distress.   VITAL SIGNS: As per the nurses' triage record.  HEENT: Normocephalic, atraumatic. Extraocular muscles are intact. Conjunctiva are pink. Negative scleral icterus. Mucous membranes are moist. Tongue in the midline. Oropharynx clear, uvula midline.   NECK: Soft, nontender and supple, full gross ROM, no meningeal signs.  CHEST: Nontender to palpation. Clear to auscultation bilaterally. No rales, rhonchi, or wheezing. Symmetric rise and fall of chest wall.   HEART: Clear S1 and S2. Regular rate and rhythm. No murmurs appreciated on auscultation.  Strong and equal pulses in the extremities.  ABDOMEN: Soft, nontender, nondistended, positive bowel sounds, no palpable masses.  MUSCULOSKELETAL: The calves are nontender to palpation. Full gross active range of motion. Ambulating on own with no acute difficulties  NEUROLOGICAL: Awake, alert and oriented x 3. Motor power intact in the upper and lower extremities. Sensation is intact to light touch in the upper and lower extremities. Patient answering questions appropriately.   IMMUNOLOGICAL: No lymphatic streaking noted  DERMATOLOGIC: Warm and dry without petechiae, rashes, or ecchymosis noted on visible skin.   PYSCH: Cooperative with appropriate mood and affect.  ED Course & MDM   Diagnoses as of 10/25/23 2345   Chest pain, unspecified type   Hypertension, unspecified type       Medical Decision Making  Parts of this chart have been completed using voice recognition software. Please excuse any errors of transcription.  My thought process and reason for plan has been formulated from the time that I saw the patient until the time of disposition and is not specific to one specific moment during their visit and furthermore my MDM  encompasses this entire chart and not only this text box.      HPI: Detailed above.    Exam: A medically appropriate exam performed, outlined above, given the known history and presentation.    History obtained from: Patient    EKG: My supervising physician's EKG    Social Determinants of Health considered during this visit: At home    Medications given during visit:  Medications   aspirin chewable tablet 243 mg (243 mg oral Given 10/25/23 2008)   ondansetron (Zofran) injection 4 mg (4 mg intravenous Given 10/25/23 2044)   morphine injection 4 mg (4 mg intravenous Given 10/25/23 2045)   sodium chloride 0.9 % bolus 500 mL (0 mL intravenous Stopped 10/25/23 2144)        Diagnostic/tests  Labs Reviewed   CBC WITH AUTO DIFFERENTIAL - Abnormal       Result Value    WBC 9.2      nRBC 0.0      RBC 4.32      Hemoglobin 8.9 (*)     Hematocrit 29.9 (*)     MCV 69 (*)     MCH 20.6 (*)     MCHC 29.8 (*)     RDW 18.9 (*)     Platelets 398      MPV 10.1      Neutrophils % 49.3      Immature Granulocytes %, Automated 0.2      Lymphocytes % 35.3      Monocytes % 8.7      Eosinophils % 5.8      Basophils % 0.7      Neutrophils Absolute 4.54      Immature Granulocytes Absolute, Automated 0.02      Lymphocytes Absolute 3.24      Monocytes Absolute 0.80      Eosinophils Absolute 0.53      Basophils Absolute 0.06     BASIC METABOLIC PANEL - Abnormal    Glucose 135 (*)     Sodium 137      Potassium 3.8      Chloride 103      Bicarbonate 24      Urea Nitrogen 26 (*)     Creatinine 1.50      eGFR 38 (*)     Calcium 8.7      Anion Gap 10     CREATININE - Abnormal    Creatinine 1.40      eGFR 41 (*)    SERIAL TROPONIN, INITIAL ([facility]) - Normal    Troponin T, High Sensitivity 14     SERIAL TROPONIN,  2 HOUR ([facility]) - Normal    Troponin T, High Sensitivity 14     TROPONIN T SERIES, HIGH SENSITIVITY (0, 2 HR, 6 HR)    Narrative:     The following orders were created for panel order Troponin T Series, High Sensitivity (0, 2HR, 6HR).  Procedure                                Abnormality         Status                     ---------                               -----------         ------                     Serial Troponin, Initial...[[id number]]  Normal              Final result               Serial Troponin, 2 Hour ...[[id number]]  Normal              Final result               Serial Troponin, 6 Hour ...[[id number]]                                                   Please view results for these tests on the individual orders.   SERIAL TROPONIN, 6 HOUR ([facility])      CT chest wo IV contrast   Final Result   No acute abnormality demonstrated.        Moderate-sized hiatal hernia.        Coronary artery calcifications.        Stable 6 mm intrapulmonary lymph node along right major fissure.        Stable exophytic 1.3 cm cyst arising from left hepatic lobe.        Signed by: Paris Mar 10/25/2023 11:33 PM   Dictation workstation:   [id number]      XR chest 2 views   Final Result   Normal chest.        Signed by: Paris Mar 10/25/2023 8:48 PM   Dictation workstation:   [id number]         HEART SCORE 4     Considerations/further MDM:  Patient was seen in conjucntion with my supervising physician, Dr. Adam . Please refer to her note.  Dr. Adam spoke with Dr. Parisi and he would like patient admitted to medicine with him on consult.  Patient will be admitted to inpatient general medicine.  Cardiology consult was placed for Dr. Parisi. We thoroughly discussed the history, physical exam, laboratory and imaging studies, as well as, emergency department course. Based upon that discussion, we've decided to admit for further observation and evaluation of patient´s chest pain. As I have deemed necessary from their history, physical, and studies, I have considered and evaluated for the following diagnoses: ACUTE CORONARY SYNDROME INCLUDING MI, AORTIC DISSECTION, PERICARDIAL EFFUSION or TAMPONADE, PULMONARY EMBOLISM, PNEUMONIA, PNEUMOTHORAX, RESPIRATORY DISTRESS  or COMPROMISE, MALIGNANT DYSRHYTHMIA or HYPERTENSION, SEPSIS.  Procedure  Procedures     Freida Rizzo PA-C  10/25/23 4275

## 2023-10-26 NOTE — CARE PLAN
Problem: PT Problem  Goal: Patient will ambulate 100 distance using cane with modified independent  Outcome: Progressing  Note: Patient will ambulate 100 distance using cane with modified independent

## 2023-10-26 NOTE — PROGRESS NOTES
Occupational Therapy                 Therapy Communication Note    Patient Name: Annie Joseph  MRN: [id number]  Today's Date: 10/26/2023     Discipline: Occupational Therapy    Missed Visit Reason: Missed Visit Reason: Other (Comment) (OT eval received and chart reviewed. Pt admitted to hospital with c/o chest pain. Pt currently in ED; awaiting cardiology consult; will hold eval at this time and await further POC)    Missed Time: Attempt

## 2023-10-26 NOTE — ED TRIAGE NOTES
HPI   Chief Complaint   Patient presents with    Chest Pain        TRIAGE NOTE   I saw the patient as the Clinician in Triage and performed a brief history and physical exam, established acuity, and ordered appropriate tests to develop basic plan of care. Patient will be seen by an [facility], resident and/or physician who will independently evaluate the patient. Please see subsequent provider notes for further details and disposition.     Brief HPI: In brief, Annie Joseph is a 68 y.o. female that presents for evaluation of chest pain.  The patient has a known history of coronary artery disease.  Pain began earlier this evening at around 6 PM while the patient was at an event.  She has had some associated shortness of breath as well..  She took 1 baby aspirin earlier today and took 2 sublingual nitroglycerin this evening without much relief.    Focused Physical exam:   Triage vitals are unremarkable.  Heart rate is in the 80s with regular rhythm and no murmurs.  Lungs are clear to auscultation bilaterally.  Abdomen is soft and nondistended and nontender to palpation throughout.      Plan/MDM:   Plan is for cardiac monitor, EKG, labs and chest x-ray.      Please see subsequent provider note for further details and disposition     Gabo Shelton D.O.  8:09 PM                            No data recorded HEART Score: 4                Patient History   Past Medical History:   Diagnosis Date    Encounter for full-term uncomplicated delivery      (spontaneous vaginal delivery)    Fibromyalgia 2022    Fibromyalgia    Obstructive sleep apnea (adult) (pediatric)     Obstructive sleep apnea    Other chronic pain     Chronic pain    Other conditions influencing health status     Cystocele    Other symptoms and signs involving the nervous system 2021    Suspected sleep apnea    Personal history of diseases of the blood and blood-forming organs and certain disorders involving the immune mechanism     History of anemia     Personal history of other diseases of the circulatory system 01/05/2020    History of hypertension    Personal history of other diseases of the musculoskeletal system and connective tissue 10/09/2019    History of fibromyositis    Personal history of other diseases of the musculoskeletal system and connective tissue 01/05/2020    History of arthritis    Personal history of other diseases of the musculoskeletal system and connective tissue     History of arthritis    Personal history of other diseases of the nervous system and sense organs     History of sleep disturbance    Personal history of other endocrine, nutritional and metabolic disease     History of hypothyroidism    Personal history of other infectious and parasitic diseases     History of varicella    Rectocele     Rectocele    Spontaneous ecchymoses     Bruises easily     Past Surgical History:   Procedure Laterality Date    DILATION AND CURETTAGE OF UTERUS  09/04/2015    Dilation And Curettage    OTHER SURGICAL HISTORY  09/04/2015    Cholecystotomy    OTHER SURGICAL HISTORY  09/04/2015    Breast Surgery Puncture Aspiration Of Cyst     Family History   Problem Relation Name Age of Onset    Diabetes Mother      Fibromyalgia Mother      Other (MI) Mother      Other (cerebrovascular accident) Father      Heart attack Father      Other (anxiety and depression) Sister      Other (anxiety and depression) Brother      Diabetes Brother      Other (MI) Brother      Breast cancer Maternal Grandmother      Other (cardiac disorder) Other      Other (htn) Other      Diabetes Other aunt     Other (MI) Other GM     Other (MI [Other]) Other GF      Social History     Tobacco Use    Smoking status: Never    Smokeless tobacco: Never   Vaping Use    Vaping Use: Never used   Substance Use Topics    Alcohol use: Not Currently    Drug use: Not Currently       Physical Exam   ED Triage Vitals [10/25/23 1957]   Temp Heart Rate Resp BP   36.6 °C (97.9 °F) 80 16 161/86       SpO2 Temp Source Heart Rate Source Patient Position   100 % Temporal Monitor --      BP Location FiO2 (%)     -- --       Physical Exam    ED Course & MDM   Diagnoses as of 10/26/23 1522   Chest pain, unspecified type   Hypertension, unspecified type       Medical Decision Making      Procedure  Procedures

## 2023-10-26 NOTE — DISCHARGE SUMMARY
Discharge Diagnosis  Chest pain    Issues Requiring Follow-Up  Please follow-up with your PCP and with your GI doctor  To follow-up with cardiology  Discharge Meds     Your medication list        CONTINUE taking these medications        Instructions Last Dose Given Next Dose Due   amLODIPine 5 mg tablet  Commonly known as: Norvasc           aspirin 81 mg EC tablet           atorvastatin 80 mg tablet  Commonly known as: Lipitor           cholecalciferol 25 MCG (1000 UT) tablet  Commonly known as: Vitamin D-3           clopidogrel 75 mg tablet  Commonly known as: Plavix           isosorbide mononitrate ER 30 mg 24 hr tablet  Commonly known as: Imdur           magnesium oxide 400 mg tablet  Commonly known as: Mag-Ox           nitroglycerin 0.4 mg SL tablet  Commonly known as: Nitrostat           ZyrTEC 10 mg tablet  Generic drug: cetirizine                  STOP taking these medications      DULoxetine 60 mg DR capsule  Commonly known as: Cymbalta        pantoprazole 40 mg EC tablet  Commonly known as: ProtoNix                 Test Results Pending At Discharge  Pending Labs       No current pending labs.            Hospital Course   The patient is a 68-year-old female presenting to the emergency department for evaluation of substernal and left-sided chest pain that radiates to her back, shoulder and left side of her neck.  She states that she has had worsening symptoms over the past several days and it was acutely worse today.  No specific better or worse.  It is a constant aching pain.  She denies any significant shortness of breath.  No palpitations.  No diaphoresis.  No fever or chills.  No cough or congestion.  No recent injury or trauma.  No abdominal pain.  No nausea vomiting.  No diarrhea or constipation no urinary complaints.  She states that she does have a history of CAD with a heart attack in December 2022.  She did receive 2 stents at that time.  Her cardiologist is Dr. Parisi.   Patient was seen and  evaluated by cardiology, recommending to resume Plavix and follow-up as outpatient.  No intervention recommended.  Patient was seen and evaluated by GI and they are recommending to follow-up as outpatient.  Pertinent Physical Exam At Time of Discharge  Physical Exam  HENT:      Mouth/Throat:      Mouth: Mucous membranes are dry.   Eyes:      Extraocular Movements: Extraocular movements intact.   Cardiovascular:      Rate and Rhythm: Regular rhythm.   Abdominal:      General: Bowel sounds are normal.   Musculoskeletal:         General: Normal range of motion.      Cervical back: Neck supple.   Skin:     General: Skin is warm and dry.   Neurological:      Mental Status: She is alert and oriented to person, place, and time.         Outpatient Follow-Up  Future Appointments   Date Time Provider Department Center   11/15/2023  1:30 PM Florentin Crawley MD [id number] [facility]   1/24/2024 12:40 PM Slime Mcdermott DO [id number] [facility]   2/21/2024 11:00 AM Pankaj Jade MD [id number] [facility]         Jory Lyons APRN-CNP

## 2023-10-26 NOTE — CONSULTS
Consults  History Of Present Illness:    Annie Joseph is a 68 y.o. female presenting with medical significant for history of known coronary artery disease status post stent placement to the proximal segment of the left anterior descending artery and stent placement to the proximal segment of the first diagonal branch of the left anterior descending coronary artery in December 2022 came in for evaluation of chest discomfort emergency room.  Patient informs me that yesterday she was at dental office where she had half an hour of dental cleaning and she was waiting for half an hour and thereafter for 2 hours she has to undergo work on the crown on her right molars.  Once this has been done she is symptoms of left-sided chest discomfort and pain going to the left side of her arm and the left side of her neck.  With the symptoms she came to emergency room for further evaluation.  So far there is no evidence for acute coronary syndrome.  She received IV morphine in the emergency room and that resolved the pain.  She admits compliance to her medications and she has a follow-up to see me in the second week of November.  She was advised to hold Plavix for 5 days before the dental work-up and patient informs me she is taking the Plavix again starting this morning  last Recorded Vitals:  Vitals:    10/26/23 1425 10/26/23 1510 10/26/23 1516 10/26/23 1533   BP:  150/77  134/60   BP Location:    Right arm   Patient Position:    Lying   Pulse: 66 78  77   Resp: 10 22  18   Temp:   37.2 °C (99 °F) 37 °C (98.6 °F)   TempSrc:    Oral   SpO2: 97% 92%  96%   Weight:       Height:           Last Labs:  CBC - 10/26/2023:  8:31 AM  8.6 9.3 402    31.9      CMP - 10/26/2023:  8:31 AM  8.8 6.6 21 --- 0.6   _ 4.3 23 58      PTT - 12/6/2022:  4:34 AM  1.1   11.0 28.2     Hemoglobin A1C   Date/Time Value Ref Range Status   10/26/2023 02:24 AM 5.4 See below % Final   03/21/2023 02:19 PM 6.2 (A) % Final     Comment:          Diagnosis of  "Diabetes-Adults   Non-Diabetic: < or = 5.6%   Increased risk for developing diabetes: 5.7-6.4%   Diagnostic of diabetes: > or = 6.5%  .       Monitoring of Diabetes                Age (y)     Therapeutic Goal (%)   Adults:          >18           <7.0   Pediatrics:    13-18           <7.5                   7-12           <8.0                   0- 6            7.5-8.5   American Diabetes Association. Diabetes Care 33(S1), Jan 2010.   06/23/2022 10:11 AM 6.0 (A) % Final     Comment:          Diagnosis of Diabetes-Adults   Non-Diabetic: < or = 5.6%   Increased risk for developing diabetes: 5.7-6.4%   Diagnostic of diabetes: > or = 6.5%  .       Monitoring of Diabetes                Age (y)     Therapeutic Goal (%)   Adults:          >18           <7.0   Pediatrics:    13-18           <7.5                   7-12           <8.0                   0- 6            7.5-8.5   American Diabetes Association. Diabetes Care 33(S1), Jan 2010.       LDL Calculated   Date/Time Value Ref Range Status   10/26/2023 02:24  65 - 130 mg/dL Final   12/04/2022 05:33 AM 95 65 - 130 MG/DL Final     VLDL   Date/Time Value Ref Range Status   01/14/2022 11:34 AM 16 0 - 40 mg/dL Final   10/12/2020 10:23 AM 17 0 - 40 mg/dL Final   09/17/2019 09:00 AM 14 0 - 40 mg/dL Final      Last I/O:  No intake/output data recorded.    Past Cardiology Tests (Last 3 Years):  EKG:  Electrocardiogram, 12-lead PRN ACS symptoms 10/26/2023    Echo:  No results found for this or any previous visit from the past 1095 days.    Ejection Fractions:  No results found for: \"EF\"  Cath:  No results found for this or any previous visit from the past 1095 days.    Stress Test:  No results found for this or any previous visit from the past 1095 days.    Cardiac Imaging:  No results found for this or any previous visit from the past 1095 days.      Past Medical History:  She has a past medical history of Encounter for full-term uncomplicated delivery, Fibromyalgia " (06/22/2022), Obstructive sleep apnea (adult) (pediatric), Other chronic pain, Other conditions influencing health status, Other symptoms and signs involving the nervous system (04/20/2021), Personal history of diseases of the blood and blood-forming organs and certain disorders involving the immune mechanism, Personal history of other diseases of the circulatory system (01/05/2020), Personal history of other diseases of the musculoskeletal system and connective tissue (10/09/2019), Personal history of other diseases of the musculoskeletal system and connective tissue (01/05/2020), Personal history of other diseases of the musculoskeletal system and connective tissue, Personal history of other diseases of the nervous system and sense organs, Personal history of other endocrine, nutritional and metabolic disease, Personal history of other infectious and parasitic diseases, Rectocele, and Spontaneous ecchymoses.    Past Surgical History:  She has a past surgical history that includes Other surgical history (09/04/2015); Other surgical history (09/04/2015); and Dilation and curettage of uterus (09/04/2015).      Social History:  She reports that she has never smoked. She has never used smokeless tobacco. She reports that she does not currently use alcohol. She reports that she does not currently use drugs.    Family History:  Family History   Problem Relation Name Age of Onset    Diabetes Mother      Fibromyalgia Mother      Other (MI) Mother      Other (cerebrovascular accident) Father      Heart attack Father      Other (anxiety and depression) Sister      Other (anxiety and depression) Brother      Diabetes Brother      Other (MI) Brother      Breast cancer Maternal Grandmother      Other (cardiac disorder) Other      Other (htn) Other      Diabetes Other aunt     Other (MI) Other GM     Other (MI [Other]) Other GF         Allergies:  Atorvastatin, Clindamycin, Levaquin [levofloxacin], Polyglyceryl-6  polyricinoleate, and Sulfa (sulfonamide antibiotics)    Inpatient Medications:  Scheduled medications   Medication Dose Route Frequency    amLODIPine  5 mg oral Daily    aspirin  81 mg oral Daily    clopidogrel  75 mg oral Daily    DULoxetine  60 mg oral Daily    enoxaparin  40 mg subcutaneous q24h    iron sucrose  200 mg intravenous Daily    isosorbide mononitrate ER  30 mg oral BID after meals    loratadine  10 mg oral Daily    magnesium oxide  400 mg oral Daily    pantoprazole  40 mg oral Daily     PRN medications   Medication    acetaminophen    Or    acetaminophen    Or    acetaminophen    ondansetron    Or    ondansetron    polyethylene glycol     Continuous Medications   Medication Dose Last Rate     Outpatient Medications:  Current Outpatient Medications   Medication Instructions    amLODIPine (Norvasc) 5 mg tablet 1 tablet, oral, Daily    aspirin 81 mg EC tablet 1 tablet, oral, Daily    atorvastatin (Lipitor) 80 mg tablet 1 tablet, oral, Nightly    cetirizine (ZyrTEC) 10 mg tablet 1 tablet, oral, As needed, EVERY MORNING    cholecalciferol (Vitamin D-3) 25 MCG (1000 UT) tablet oral    clopidogrel (Plavix) 75 mg tablet 1 tablet, oral, Daily    DULoxetine (CYMBALTA) 60 mg, oral, Daily    isosorbide mononitrate ER (Imdur) 30 mg 24 hr tablet 1 tablet, oral, 2 times daily, Do not crush or chew.     magnesium oxide (Mag-Ox) 400 mg tablet 1 tablet, oral, Daily    nitroglycerin (Nitrostat) 0.4 mg SL tablet 1 tablet, sublingual, As needed    pantoprazole (PROTONIX) 40 mg, oral, Daily       Physical Exam:  Physical Exam  Constitutional:       Appearance: Normal appearance.   HENT:      Head: Normocephalic and atraumatic.      Nose: Nose normal.      Mouth/Throat:      Mouth: Mucous membranes are moist.      Pharynx: Oropharynx is clear.   Eyes:      Pupils: Pupils are equal, round, and reactive to light.   Cardiovascular:      Rate and Rhythm: Normal rate and regular rhythm.      Pulses: Normal pulses.   Pulmonary:       Effort: Pulmonary effort is normal.      Breath sounds: Normal breath sounds.   Abdominal:      General: Abdomen is flat. Bowel sounds are normal.      Palpations: Abdomen is soft.   Musculoskeletal:         General: Normal range of motion.   Skin:     General: Skin is warm and dry.      Capillary Refill: Capillary refill takes more than 3 seconds.   Neurological:      General: No focal deficit present.      Mental Status: He is alert and oriented to person, place, and time.   Psychiatric:         Mood and Affect: Mood normal.         Behavior: Behavior normal.         Thought Content: Thought content normal.         Judgment: Judgment normal.        Assessment/Plan   Chest pain-so far there is no evidence of acute coronary syndrome her troponins are unremarkable and her EKG shows no acute ST segment changes.  I have counseled patient to keep taking the dual antiplate therapy and I will see her in follow-up in November of this year. Will continue the patient on long-acting oral nitrates that she is currently on  HTN- well controlled.  Dyslipidemia for which she remains on high intense statin which will be continued.  I have reassured the patient and patient would like to be discharged home.  I will see her in follow-up as scheduled.    Dressing Status Dry;Clean 10/26/23 1550   Number of days: 1       Code Status:  Full Code    I spent 45 minutes in the professional and overall care of this patient.        James Parisi MD

## 2023-10-26 NOTE — ED NOTES
Patient sleeping, chest rises and falls at equal intervals and patient takes breaths, responds verbally to calling of name and acknowledges this nurses presence in room no needs a this time call light in reach      Rebekah Healy LPN  10/26/23 0540

## 2023-10-26 NOTE — PROGRESS NOTES
Occupational Therapy    Evaluation/Treatment    Patient Name: Annie Joseph  MRN: [id number]  : 1955  Today's Date: 10/26/23  Time Calculation  Start Time: 1308  Stop Time: 1335  Time Calculation (min): 27 min       Assessment:  OT Assessment: Pt would benefit from acute OT services  Prognosis: Good  End of Session Communication: Bedside nurse  End of Session Patient Position: Up in chair  OT Assessment Results: Decreased ADL status, Decreased endurance, Decreased functional mobility  Prognosis: Good  Strengths: Ability to acquire knowledge, Capable of completing ADLs semi/independent, Support of extended family/friends  Plan:  Treatment Interventions: ADL retraining, Functional transfer training, UE strengthening/ROM, Endurance training, Patient/family training  OT Frequency: 3 times per week  OT Discharge Recommendations: Low intensity level of continued care (recommend assist/supervision upon d/c)  OT - OK to Discharge: Yes  Treatment Interventions: ADL retraining, Functional transfer training, UE strengthening/ROM, Endurance training, Patient/family training    General:   OT Received On: 10/26/23  General  Reason for Referral: impaired ADLs; pt admitted from home with c/o chest pain radiating to back and shoulder.  Past Medical History Relevant to Rehab: CAD, STEMI, cardiac stents, ROBERT - CPAP, cystocele, HTN, chronic gastritis, left renal mass, fibromylagia, GERD, choley, left radical nephrectomy  Missed Visit: No  Family/Caregiver Present: No  Co-Treatment: PT  Prior to Session Communication: Bedside nurse  Patient Position Received: Bed, 2 rail up  General Comment: cleared by nursing. pt pleasant and agreeable to therapy  Precautions:  Medical Precautions: Fall precautions  Vital Signs:  Heart Rate: 77  Heart Rate Source: Monitor  SpO2: 97 % (on RA)  Pain:  Pain Assessment  Pain Assessment: 0-10  Pain Score: 0 - No pain    Objective   Cognition:  Overall Cognitive Status: Within Functional Limits (AOX  3)           Home Living:  Type of Home: House  Lives With: Spouse  Home Adaptive Equipment: Cane  Home Layout: Two level  Home Access: Stairs to enter with rails  Entrance Stairs-Number of Steps: 1 (1 + 2)  Bathroom Shower/Tub: Tub/shower unit  Bathroom Equipment: Grab bars in shower  Bathroom Accessibility: Main level of home  Home Living Comments:  (Stair lift to 2nd level of home)  Prior Function:  Level of [place]: Independent with ADLs and functional transfers, Independent with homemaking with ambulation  Receives Help From: Family  ADL Assistance: Independent  Homemaking Assistance: Independent  Ambulatory Assistance: Independent  Hand Dominance: Left     ADL:  Eating Assistance: Independent  Grooming Assistance:  (set up)  Bathing Assistance: Minimal  UE Dressing Assistance:  (Set up)  LE Dressing Assistance: Minimal  Toileting Assistance with Device: Minimal  Activities of Daily Living:      LE Dressing  LE Dressing: Yes  Sock Level of Assistance: Setup  LE Dressing Where Assessed: Edge of bed  LE Dressing Comments: donning socks     Activity Tolerance:  Endurance: Decreased tolerance for upright activites     Bed Mobility/Transfers: Bed Mobility  Bed Mobility: Yes (close supervision for safety supine<>seated EOB)    Transfers  Transfer: Yes (close supervision sit>stand bed level; close supervision stand>sit chair level)    Therapy/Activity: Therapeutic Activity  Therapeutic Activity Performed: Yes (pt min A for balance for functional mobility extended household distance without use of AD, 3 LOB noted requiring min A to correct)    Vision:Vision - Basic Assessment  Current Vision: Wears glasses all the time  Sensation:  Sensation Comment: pt denied numbness/paresthesia  Strength:  Strength Comments: BUEs WFL      Hand Function:  Hand Function  Gross Grasp: Functional  Coordination: Functional  Extremities: RUE   RUE : Within Functional Limits and LUE   LUE: Within Functional Limits    Outcome Measures:  [facility] Daily Activity  Putting on and taking off regular lower body clothing: A little  Bathing (including washing, rinsing, drying): A little  Putting on and taking off regular upper body clothing: A little  Toileting, which includes using toilet, bedpan or urinal: A little  Taking care of personal grooming such as brushing teeth: A little  Eating Meals: None  Daily Activity - Total Score: 19    Education Documentation  ADL Training, taught by Marina Hancock OT at 10/26/2023  3:33 PM.  Learner: Patient  Readiness: Acceptance  Method: Explanation, Demonstration  Response: Needs Reinforcement    Education Comments  No comments found.      OP EDUCATION:     Goals:  Encounter Problems       Encounter Problems (Active)       Bathing       Pt will perform UB/LB bathing at mod I with use of AE prn.    (Progressing)       Start:  10/26/23    Expected End:  11/16/23               Dressing Upper Extremities       Pt will independently perform UB dressing.  (Progressing)       Start:  10/26/23    Expected End:  11/16/23               Dressings Lower Extremities       Pt will perform LB dressing at mod I with use of AE prn.    (Progressing)       Start:  10/26/23    Expected End:  11/16/23               Grooming       Pt will independently perform grooming tasks.  (Progressing)       Start:  10/26/23    Expected End:  11/16/23               Instrumental Activities of Daily Living       Pt will perform simple IADLs/retrieval of items at mod I with use of AE prn.   (Progressing)       Start:  10/26/23    Expected End:  11/16/23               Mobility       Pt will perform functional mobility household distances at mod I with use of DME prn.    (Progressing)       Start:  10/26/23    Expected End:  11/16/23               Toileting       Pt will perform toileting tasks at mod I with use of DME prn.  (Progressing)       Start:  10/26/23    Expected End:  11/16/23               Transfers       Pt will perform functional  transfers at mod I with use of DME prn .   (Progressing)       Start:  10/26/23    Expected End:  11/16/23

## 2023-10-26 NOTE — PROGRESS NOTES
Physical Therapy    Physical Therapy Evaluation & Treatment    Patient Name: Annie Joseph  MRN: [id number]  Today's Date: 10/26/2023   Time Calculation  Start Time: 1304  Stop Time: 1327  Time Calculation (min): 23 min    Assessment/Plan   PT Assessment  PT Assessment Results: Decreased endurance, Impaired balance, Decreased mobility  Rehab Prognosis: Good  Evaluation/Treatment Tolerance: Patient tolerated treatment well  End of Session Patient Position: Up in chair  IP OR SWING BED PT PLAN  Inpatient or Swing Bed: Inpatient  PT Plan  Treatment/Interventions: Transfer training, Gait training, Stair training, Balance training  PT Plan: Skilled PT  PT Frequency: 2 times per week  PT Discharge Recommendations: Low intensity level of continued care  PT Recommended Transfer Status: Stand by assist  PT - OK to Discharge: Yes      Subjective     General Visit Information:  General  Reason for Referral: Chest pain with impaired mobility  Past Medical History Relevant to Rehab: CAD, STEMI, cardiac stents, ROBERT - CPAP, cystocele, HTN, chronic gastritis, left renal mass, fibromylagia, GERD, choley, left radical nephrectomy  Co-Treatment: OT  Co-Treatment Reason: cardiac complexity  Prior to Session Communication: Bedside nurse  Patient Position Received: Bed, 2 rail up  General Comment: 68 year old female admit from  home with chest pain radiating to back and shoulder.  Home Living:  Home Living  Type of Home: House  Lives With: Spouse  Home Adaptive Equipment: Cane  Home Layout: Two level  Home Access: Stairs to enter with rails  Entrance Stairs-Rails:  (Single handrail)  Entrance Stairs-Number of Steps: 1 + 2  Bathroom Shower/Tub: Tub/shower unit  Bathroom Equipment: Grab bars in shower  Bathroom Accessibility: Main level of home (Patient sleeps on 2nd level;  bathroom on main level.)  Home Living Comments: Stair lift to 2nd level of home  Prior Level of Function:  Prior Function Per Pt/Caregiver Report  Ambulatory  "Assistance: Independent  Precautions:  Precautions  Medical Precautions: Fall precautions  Vital Signs:  Vital Signs  Heart Rate: 81    Objective   Pain:  Pain Assessment  Pain Assessment: 0-10  Pain Score: 0 - No pain (Reports chronic soreness kimani LE knees and back \"fibromylagia\")  Cognition:  Cognition  Overall Cognitive Status: Within Functional Limits    General Assessments:                Activity Tolerance  Endurance: Decreased tolerance for upright activites         Coordination  Movements are Fluid and Coordinated: Yes    Static Sitting Balance  Static Sitting-Balance Support: No upper extremity supported  Static Sitting-Level of Assistance: Independent    Static Standing Balance  Static Standing-Balance Support: No upper extremity supported  Static Standing-Level of Assistance: Close supervision  Functional Assessments:  Bed Mobility  Bed Mobility: Yes  Bed Mobility 1  Bed Mobility 1: Supine to sitting  Level of Assistance 1: Independent    Transfers  Transfer: Yes  Transfer 1  Transfer From 1: Sit to  Transfer to 1: Stand  Technique 1: Sit to stand  Transfer Level of Assistance 1: Close supervision  Transfers 2  Transfer From 2: Stand to  Transfer to 2: Sit  Technique 2: Stand to sit  Transfer Level of Assistance 2: Close supervision    Ambulation/Gait Training  Ambulation/Gait Training Performed: Yes  Ambulation/Gait Training 1  Surface 1: Level tile  Assistance 1: Minimum assistance  Comments/Distance (ft) 1: 100 feet x 1 without assistive device and assist for balance;  patient experienced 3 episodes of loss of balance requiring min assist;  patient ambulates with slow liz, decreased step length kimani LE, and decreased arm swing kimani UE.    Stairs  Stairs: No  Extremity/Trunk Assessments:  RLE   RLE : Within Functional Limits  LLE   LLE : Within Functional Limits  Treatments:            Bed Mobility  Bed Mobility: Yes  Bed Mobility 1  Bed Mobility 1: Supine to sitting  Level of Assistance 1: " Independent    Ambulation/Gait Training  Ambulation/Gait Training Performed: Yes  Ambulation/Gait Training 1  Surface 1: Level tile  Assistance 1: Minimum assistance  Comments/Distance (ft) 1: 100 feet x 1 without assistive device and assist for balance;  patient experienced 3 episodes of loss of balance requiring min assist;  patient ambulates with slow liz, decreased step length kimani LE, and decreased arm swing kimani UE.  Transfers  Transfer: Yes  Transfer 1  Transfer From 1: Sit to  Transfer to 1: Stand  Technique 1: Sit to stand  Transfer Level of Assistance 1: Close supervision  Transfers 2  Transfer From 2: Stand to  Transfer to 2: Sit  Technique 2: Stand to sit  Transfer Level of Assistance 2: Close supervision    Stairs  Stairs: No       Outcome Measures:  [facility] Basic Mobility  Turning from your back to your side while in a flat bed without using bedrails: None  Moving from lying on your back to sitting on the side of a flat bed without using bedrails: None  Moving to and from bed to chair (including a wheelchair): A little  Standing up from a chair using your arms (e.g. wheelchair or bedside chair): A little  To walk in hospital room: A little  Climbing 3-5 steps with railing: A little  Basic Mobility - Total Score: 20    Encounter Problems       Encounter Problems (Active)       PT Problem       Patient will ambulate 100 distance using cane with modified independent (Progressing)       Start:  10/26/23    Expected End:  11/09/23         Goal Note       Patient will ambulate 100 distance using cane with modified independent                     Education Documentation  Mobility Training, taught by Sukhdev Washington, PT at 10/26/2023  2:05 PM.  Learner: Patient  Readiness: Acceptance  Method: Explanation  Response: Needs Reinforcement    Education Comments  No comments found.

## 2023-10-26 NOTE — PROGRESS NOTES
Attestation note/supervisory note for BROOKE Rizzo      The patient is a 68-year-old female presenting to the emergency department for evaluation of substernal and left-sided chest pain that radiates to her back, shoulder and left side of her neck.  She states that she has had worsening symptoms over the past several days and it was acutely worse today.  No specific better or worse.  It is a constant aching pain.  She denies any significant shortness of breath.  No palpitations.  No diaphoresis.  No fever or chills.  No cough or congestion.  No recent injury or trauma.  No abdominal pain.  No nausea vomiting.  No diarrhea or constipation no urinary complaints.  She states that she does have a history of CAD with a heart attack in December 2022.  She did receive 2 stents at that time.  Her cardiologist is Dr. Parisi.  All pertinent positives and negatives are recorded above.  All other systems reviewed and otherwise negative.  Vital signs with hypertension but otherwise within normal limits.  Physical exam with a well-nourished well-developed female in no acute distress.  HEENT exam within normal limits.  She has no evidence of airway compromise or respiratory distress.  Abdominal exam is benign.  She has no gross motor, neurologic or vascular deficits on exam.      EKG with sinus rhythm at 77 bpm, frequent PVCs, normal axis, normal voltage, normal ST segment, and normal T waves      Oral aspirin ordered by the triage provider.  I also ordered IV morphine, IV Zofran and IV fluids.      Diagnostic labs with anemia, mild electrolyte balance, otherwise unremarkable.      Initial Troponin T 14. Repeat trop T 14. Delta trop T 0      Heart score 4      Chest x-ray  IMPRESSION:  Normal chest.      CT chest  IMPRESSION:  No acute abnormality demonstrated.  Moderate-sized hiatal hernia.  Coronary artery calcifications.  Stable 6 mm intrapulmonary lymph node along right major fissure.  Stable exophytic 1.3 cm cyst  arising from left hepatic lobe.      Patient does not have any acute process on CT chest and/or chest x-ray.  No evidence of pneumonia or dissection.  There is no evidence of ischemia on EKG or cardiac enzymes with the patient does have a heart score of 4.      The case was discussed with Dr. Parisi, the patient's cardiologist.  He did recommend mission by the medicine team for further management of her current symptoms and trending of her cardiac enzymes given that she does have a heart score of 4.      The patient was admitted for further management.      I personally saw the patient and performed a substantive portion of the visit including all aspects of the medical decision making.      I reviewed the results of the diagnostic labs and diagnostic imaging.  Formal radiology reading was completed by the radiologist        Emily Adam MD

## 2023-10-27 ENCOUNTER — PATIENT OUTREACH (OUTPATIENT)
Dept: PRIMARY CARE | Facility: CLINIC | Age: 68
End: 2023-10-27
Payer: MEDICARE

## 2023-10-27 DIAGNOSIS — R07.9 CHEST PAIN, UNSPECIFIED TYPE: ICD-10-CM

## 2023-10-27 NOTE — NURSING NOTE
Pt discharged at this time, denies any chest pain.  Pt left with  and all belongings sent with her.

## 2023-11-07 ENCOUNTER — TELEPHONE (OUTPATIENT)
Dept: ALLERGY | Facility: CLINIC | Age: 68
End: 2023-11-07
Payer: MEDICARE

## 2023-11-10 ENCOUNTER — PATIENT OUTREACH (OUTPATIENT)
Dept: PRIMARY CARE | Facility: CLINIC | Age: 68
End: 2023-11-10
Payer: MEDICARE

## 2023-11-10 NOTE — PROGRESS NOTES
Unable to reach patient for call back 14 days post discharge from the hospital.  M with call back number for patient to call if needed   If no voicemail available call attempts x 2 were made to contact the patient to assist with any questions or concerns patient may have. Patient did not follow up with their PCP within that time. Patient dis-enrolled from [facility] this date.    
no abdominal pain, no bloating, no constipation, no diarrhea, no nausea and no vomiting.

## 2023-11-15 ENCOUNTER — OFFICE VISIT (OUTPATIENT)
Dept: ALLERGY | Facility: CLINIC | Age: 68
End: 2023-11-15
Payer: MEDICARE

## 2023-11-15 VITALS
DIASTOLIC BLOOD PRESSURE: 80 MMHG | SYSTOLIC BLOOD PRESSURE: 175 MMHG | BODY MASS INDEX: 35.7 KG/M2 | HEIGHT: 62 IN | HEART RATE: 70 BPM | WEIGHT: 194 LBS

## 2023-11-15 DIAGNOSIS — G47.33 OSA (OBSTRUCTIVE SLEEP APNEA): Primary | ICD-10-CM

## 2023-11-15 DIAGNOSIS — J30.89 ALLERGIC RHINITIS DUE TO OTHER ALLERGIC TRIGGER, UNSPECIFIED SEASONALITY: ICD-10-CM

## 2023-11-15 DIAGNOSIS — G47.19 EXCESSIVE DAYTIME SLEEPINESS: ICD-10-CM

## 2023-11-15 DIAGNOSIS — G47.31 CENTRAL SLEEP APNEA: ICD-10-CM

## 2023-11-15 PROCEDURE — 1159F MED LIST DOCD IN RCRD: CPT | Performed by: ALLERGY & IMMUNOLOGY

## 2023-11-15 PROCEDURE — 3077F SYST BP >= 140 MM HG: CPT | Performed by: ALLERGY & IMMUNOLOGY

## 2023-11-15 PROCEDURE — 1126F AMNT PAIN NOTED NONE PRSNT: CPT | Performed by: ALLERGY & IMMUNOLOGY

## 2023-11-15 PROCEDURE — 1036F TOBACCO NON-USER: CPT | Performed by: ALLERGY & IMMUNOLOGY

## 2023-11-15 PROCEDURE — 99214 OFFICE O/P EST MOD 30 MIN: CPT | Performed by: ALLERGY & IMMUNOLOGY

## 2023-11-15 PROCEDURE — 1160F RVW MEDS BY RX/DR IN RCRD: CPT | Performed by: ALLERGY & IMMUNOLOGY

## 2023-11-15 PROCEDURE — 3079F DIAST BP 80-89 MM HG: CPT | Performed by: ALLERGY & IMMUNOLOGY

## 2023-11-15 RX ORDER — MAGNESIUM GLYCINATE 100 MG
CAPSULE ORAL
COMMUNITY

## 2023-11-15 RX ORDER — FLUTICASONE PROPIONATE 50 MCG
2 SPRAY, SUSPENSION (ML) NASAL DAILY
Qty: 16 G | Refills: 11 | Status: SHIPPED | OUTPATIENT
Start: 2023-11-15 | End: 2024-11-14

## 2023-11-15 NOTE — PROGRESS NOTES
"Subjective   Patient ID:   [id number]   Annie Joseph is a 68 y.o. female who presents for Follow-up (Cpap machine).    Chief Complaint   Patient presents with    Follow-up     Cpap machine          HPI  This patient is here to evaluate for:    Patient presents for follow up of obstructive sleep apnea.     Current mask:  FFM  Current dme: [employer]     ROBERT chip read:  usage over 4 hours:   60/90 days and 83 total   Average usage:  5:06    hours  BiPAP  15/8 cm H2O   Pressure support 6cm  AHI = 3.9  Mask leak 95th percentile is:  10.5  Falling asleep while watching tv    This patient denies any sleepiness while driving or any accidents related to sleepiness. There is no report of nodding at stop lights/signs, crossing center lines, or motor vehicle accidents due to sleepiness.     In [place], she had allergy shots  Had been taking allergy meds daily and stopped after she had her heart attack.  Now she is having a lot of sinus pressure.  And mucus, post nasal drainage. That led to cough.   I recommended fluticasone nasal that she has.     Pmhx:  The sleep study from 6/8/21 showing ahi 82.1 and supine ahi 85.7. no rem.   Cpap titration portion did not lead to REM.   At 20/15, ahi was 6.7. Auto-bipap   See above chip reading. She does not need such a high pressure.     Review of Systems   All other systems reviewed and are negative.    Dental problems with ulcerated rash on gums lasting weeks  Weren't sure of infection vs allergy  Pt reports they left the cords and that opened the area.   Used lidocaine without epi due to her heart. Needed 4 shots so then added epi.  The issue is getting better.     Objective     /80   Pulse 70   Ht 1.575 m (5' 2\")   Wt 88 kg (194 lb)   BMI 35.48 kg/m²      Physical Exam  Vitals and nursing note reviewed.   Constitutional:       Appearance: Normal appearance. She is normal weight.   HENT:      Head: Normocephalic and atraumatic.      Right Ear: External ear " normal.      Left Ear: External ear normal.      Nose: No septal deviation, congestion or rhinorrhea.      Right Turbinates: Not enlarged, swollen or pale.      Left Turbinates: Not enlarged, swollen or pale.      Mouth/Throat:      Mouth: Mucous membranes are moist.   Eyes:      Extraocular Movements: Extraocular movements intact.      Conjunctiva/sclera: Conjunctivae normal.      Pupils: Pupils are equal, round, and reactive to light.   Cardiovascular:      Rate and Rhythm: Normal rate and regular rhythm.      Pulses: Normal pulses.      Heart sounds: Normal heart sounds.   Pulmonary:      Effort: Pulmonary effort is normal.      Breath sounds: Normal breath sounds. No wheezing, rhonchi or rales.   Musculoskeletal:         General: Normal range of motion.   Skin:     General: Skin is warm and dry.      Findings: No erythema or rash.   Neurological:      General: No focal deficit present.      Mental Status: She is alert and oriented to person, place, and time.   Psychiatric:         Mood and Affect: Mood normal.         Behavior: Behavior normal.            Current Outpatient Medications   Medication Sig Dispense Refill    amLODIPine (Norvasc) 5 mg tablet Take 1 tablet (5 mg) by mouth once daily.      aspirin 81 mg EC tablet Take 1 tablet (81 mg) by mouth once daily.      cetirizine (ZyrTEC) 10 mg tablet Take 1 tablet (10 mg) by mouth if needed. EVERY MORNING      cholecalciferol (Vitamin D-3) 25 MCG (1000 UT) tablet Take by mouth.      clopidogrel (Plavix) 75 mg tablet Take 1 tablet (75 mg) by mouth once daily.      isosorbide mononitrate ER (Imdur) 30 mg 24 hr tablet Take 1 tablet (30 mg) by mouth 2 times a day. Do not crush or chew.      nitroglycerin (Nitrostat) 0.4 mg SL tablet Place 1 tablet (0.4 mg) under the tongue if needed for chest pain.      pantoprazole (ProtoNix) 40 mg EC tablet Take 1 tablet by mouth once daily 90 tablet 1    atorvastatin (Lipitor) 80 mg tablet Take 1 tablet (80 mg) by mouth once  daily at bedtime.      magnesium glycinate 100 mg magnesium capsule Take by mouth.      magnesium oxide (Mag-Ox) 400 mg tablet Take 1 tablet (400 mg) by mouth once daily.       No current facility-administered medications for this visit.       Assessment/Plan   Diagnoses and all orders for this visit:  ROBERT (obstructive sleep apnea)  Excessive daytime sleepiness  Central sleep apnea    The Obstructive sleep apnea is well treated with CPAP usage and the usage (compliance) of CPAP is good. This patient is benefiting from using PAP and it is medically necessary. Recommend continuing the current plan and machine settings.    Obstructive sleep apnea (ROBERT) is a risk factor for impairing one's ability to function on daily activities such as driving, focus and mental well-being.  And ROBERT is a risk factor and can lead to chronic health conditions and cardiovascular complications such as heart disease, high blood pressure, diabetes, heart attacks and stroke. CPAP has been shown to treat and prevent these conditions by treating the obstructive sleep apnea. This patient has excessive daytime sleepiness   and we are addressing its treatment with CPAP. The ROBERT is WELL CONTROLLED and management with CPAP is reducing these risks.      Florentin Crawley MD

## 2023-11-22 ENCOUNTER — HOSPITAL ENCOUNTER (OUTPATIENT)
Dept: RADIOLOGY | Facility: EXTERNAL LOCATION | Age: 68
Discharge: HOME | End: 2023-11-22

## 2023-11-22 DIAGNOSIS — S99.912A LEFT ANKLE INJURY, INITIAL ENCOUNTER: ICD-10-CM

## 2023-11-22 DIAGNOSIS — M25.572 PAIN IN LEFT ANKLE AND JOINTS OF LEFT FOOT: ICD-10-CM

## 2023-11-28 ENCOUNTER — APPOINTMENT (OUTPATIENT)
Dept: PRIMARY CARE | Facility: CLINIC | Age: 68
End: 2023-11-28
Payer: MEDICARE

## 2023-11-29 ENCOUNTER — OFFICE VISIT (OUTPATIENT)
Dept: PRIMARY CARE | Facility: CLINIC | Age: 68
End: 2023-11-29
Payer: MEDICARE

## 2023-11-29 VITALS
HEIGHT: 62 IN | RESPIRATION RATE: 17 BRPM | DIASTOLIC BLOOD PRESSURE: 68 MMHG | OXYGEN SATURATION: 95 % | WEIGHT: 197.6 LBS | HEART RATE: 72 BPM | SYSTOLIC BLOOD PRESSURE: 116 MMHG | BODY MASS INDEX: 36.36 KG/M2

## 2023-11-29 DIAGNOSIS — M17.12 ARTHRITIS OF KNEE, LEFT: ICD-10-CM

## 2023-11-29 DIAGNOSIS — N18.31 STAGE 3A CHRONIC KIDNEY DISEASE (MULTI): ICD-10-CM

## 2023-11-29 DIAGNOSIS — D50.9 IRON DEFICIENCY ANEMIA, UNSPECIFIED IRON DEFICIENCY ANEMIA TYPE: Primary | ICD-10-CM

## 2023-11-29 DIAGNOSIS — E61.1 IRON DEFICIENCY: ICD-10-CM

## 2023-11-29 DIAGNOSIS — I10 HYPERTENSION, UNSPECIFIED TYPE: ICD-10-CM

## 2023-11-29 DIAGNOSIS — E66.01 OBESITY, MORBID (MULTI): ICD-10-CM

## 2023-11-29 PROCEDURE — 1160F RVW MEDS BY RX/DR IN RCRD: CPT | Performed by: STUDENT IN AN ORGANIZED HEALTH CARE EDUCATION/TRAINING PROGRAM

## 2023-11-29 PROCEDURE — 1036F TOBACCO NON-USER: CPT | Performed by: STUDENT IN AN ORGANIZED HEALTH CARE EDUCATION/TRAINING PROGRAM

## 2023-11-29 PROCEDURE — 1159F MED LIST DOCD IN RCRD: CPT | Performed by: STUDENT IN AN ORGANIZED HEALTH CARE EDUCATION/TRAINING PROGRAM

## 2023-11-29 PROCEDURE — 99214 OFFICE O/P EST MOD 30 MIN: CPT | Performed by: STUDENT IN AN ORGANIZED HEALTH CARE EDUCATION/TRAINING PROGRAM

## 2023-11-29 PROCEDURE — 1126F AMNT PAIN NOTED NONE PRSNT: CPT | Performed by: STUDENT IN AN ORGANIZED HEALTH CARE EDUCATION/TRAINING PROGRAM

## 2023-11-29 PROCEDURE — 3074F SYST BP LT 130 MM HG: CPT | Performed by: STUDENT IN AN ORGANIZED HEALTH CARE EDUCATION/TRAINING PROGRAM

## 2023-11-29 PROCEDURE — 3078F DIAST BP <80 MM HG: CPT | Performed by: STUDENT IN AN ORGANIZED HEALTH CARE EDUCATION/TRAINING PROGRAM

## 2023-11-29 RX ORDER — FERROUS SULFATE 325(65) MG
325 TABLET ORAL
Qty: 90 TABLET | Refills: 1 | Status: SHIPPED | OUTPATIENT
Start: 2023-11-29 | End: 2024-05-09

## 2023-11-29 RX ORDER — VALSARTAN 160 MG/1
160 TABLET ORAL DAILY
COMMUNITY

## 2023-11-29 ASSESSMENT — PATIENT HEALTH QUESTIONNAIRE - PHQ9
2. FEELING DOWN, DEPRESSED OR HOPELESS: NOT AT ALL
1. LITTLE INTEREST OR PLEASURE IN DOING THINGS: NOT AT ALL
SUM OF ALL RESPONSES TO PHQ9 QUESTIONS 1 AND 2: 0

## 2023-11-29 ASSESSMENT — ENCOUNTER SYMPTOMS: FATIGUE: 1

## 2023-11-29 NOTE — ASSESSMENT & PLAN NOTE
Anemia has been gradually worsening.  Patient most recent hemoglobin approximately 10  This is likely contributing to her chronic fatigue  Patient denies any GI bleeding.  EGD and colonoscopy ordered  Stool studies were pending patient drop off  Start iron 325 mg daily recheck iron studies in 3 months

## 2023-11-29 NOTE — ASSESSMENT & PLAN NOTE
Due to recent blood pressure elevation patient regimen was changed  Increased amlodipine 10 mg   Increased Valsartan 160 mg daily   They will today continue with recent changes  Follow-up with cardiology  Physical exam was stable. Discussed maintaining diets such as DASH to help maintain normal Blood pressure. Encouraged regular exercise for a total of 120 min weekly. We will fu labs in 1-3 days. For now there will be no change in medical management. All questions and concerns were addressed. Pt will follow up in 4-6 months or sooner if needed.

## 2023-11-29 NOTE — ASSESSMENT & PLAN NOTE
Over the last 7 months patient GFR has gone from 60 to 41 without in her regiment  We will repeat BMP

## 2023-11-29 NOTE — PROGRESS NOTES
Subjective   Patient ID: Annie Joseph is a 68 y.o. female who presents for Anemia (Pt is here to discuss her anemia./).      Pt had an episode of back spasms and chest pain on 10/25/23-10/26/2023. Due to history of CAD she was kept.  Found to have     Iron def anemia noted. Denies any dark stools, abdominal pain     Foot sprain placed in boot from urgent care at   11/22/23- xray foot mild osteoarthritis of the mtp joint   Xray ankle- No acute fracture or dislocation in the left ankle on these 2 AP and  oblique views    Doing well with recent changes from Dr. Parisi   Increased amlodipine 10 mg   Increased Valsartan 160 mg daily     ERICK   Cr 1.4  GFR 41 down from 60 3/23        Review of Systems   Constitutional:  Positive for fatigue.   All other systems reviewed and are negative.      Objective   Physical Exam  Vitals reviewed.   Constitutional:       Appearance: Normal appearance.   Cardiovascular:      Rate and Rhythm: Normal rate and regular rhythm.      Heart sounds: Murmur heard.   Pulmonary:      Effort: Pulmonary effort is normal. No respiratory distress.      Breath sounds: Normal breath sounds. No wheezing.   Musculoskeletal:      Cervical back: Neck supple.      Left lower leg: No edema.   Neurological:      Mental Status: She is alert.         Assessment/Plan   Problem List Items Addressed This Visit             ICD-10-CM    Arthritis of knee, left M17.12     Chronic arthritis of the knee patient will opt for her aquatic therapy as next steps         Relevant Orders    Referral to Physical Therapy    Hypertension I10     Due to recent blood pressure elevation patient regimen was changed  Increased amlodipine 10 mg   Increased Valsartan 160 mg daily   They will today continue with recent changes  Follow-up with cardiology  Physical exam was stable. Discussed maintaining diets such as DASH to help maintain normal Blood pressure. Encouraged regular exercise for a total of 120 min weekly. We will  fu labs in 1-3 days. For now there will be no change in medical management. All questions and concerns were addressed. Pt will follow up in 4-6 months or sooner if needed.            Iron deficiency anemia - Primary D50.9     Anemia has been gradually worsening.  Patient most recent hemoglobin approximately 10  This is likely contributing to her chronic fatigue  Patient denies any GI bleeding.  EGD and colonoscopy ordered  Stool studies were pending patient drop off  Start iron 325 mg daily recheck iron studies in 3 months         Relevant Medications    ferrous sulfate, 325 mg ferrous sulfate, (FerrouSuL) tablet    Other Relevant Orders    EGD    Obesity, morbid (CMS/HCC) E66.01    Stage 3a chronic kidney disease (CMS/HCC) N18.31     Over the last 7 months patient GFR has gone from 60 to 41 without in her regiment  We will repeat BMP          Other Visit Diagnoses         Codes    Iron deficiency     E61.1    Relevant Medications    ferrous sulfate, 325 mg ferrous sulfate, (FerrouSuL) tablet    Other Relevant Orders    CBC and Auto Differential    Folate    Vitamin B12    Iron and TIBC    Colonoscopy Diagnostic    EGD

## 2023-12-04 ENCOUNTER — HOSPITAL ENCOUNTER (OUTPATIENT)
Dept: RADIOLOGY | Facility: HOSPITAL | Age: 68
Discharge: HOME | End: 2023-12-04
Payer: MEDICARE

## 2023-12-04 VITALS — BODY MASS INDEX: 34.96 KG/M2 | WEIGHT: 190 LBS | HEIGHT: 62 IN

## 2023-12-04 DIAGNOSIS — Z12.31 ENCOUNTER FOR SCREENING MAMMOGRAM FOR MALIGNANT NEOPLASM OF BREAST: ICD-10-CM

## 2023-12-04 DIAGNOSIS — Z12.39 ENCOUNTER FOR SCREENING FOR MALIGNANT NEOPLASM OF BREAST, UNSPECIFIED SCREENING MODALITY: ICD-10-CM

## 2023-12-04 DIAGNOSIS — C64.2 MALIGNANT NEOPLASM OF LEFT KIDNEY, EXCEPT RENAL PELVIS (MULTI): ICD-10-CM

## 2023-12-04 PROCEDURE — 77063 BREAST TOMOSYNTHESIS BI: CPT

## 2023-12-04 PROCEDURE — 74176 CT ABD & PELVIS W/O CONTRAST: CPT

## 2023-12-08 ENCOUNTER — LAB (OUTPATIENT)
Dept: LAB | Facility: LAB | Age: 68
End: 2023-12-08
Payer: MEDICARE

## 2023-12-08 LAB — HEMOCCULT SP1 STL QL: NEGATIVE

## 2023-12-08 PROCEDURE — 82270 OCCULT BLOOD FECES: CPT | Performed by: INTERNAL MEDICINE

## 2024-01-04 ENCOUNTER — APPOINTMENT (OUTPATIENT)
Dept: GASTROENTEROLOGY | Facility: CLINIC | Age: 69
End: 2024-01-04
Payer: MEDICARE

## 2024-01-05 ENCOUNTER — HOSPITAL ENCOUNTER (OUTPATIENT)
Dept: RADIOLOGY | Facility: HOSPITAL | Age: 69
Discharge: HOME | End: 2024-01-05
Payer: MEDICARE

## 2024-01-05 DIAGNOSIS — Z78.0 ASYMPTOMATIC MENOPAUSAL STATE: ICD-10-CM

## 2024-01-05 PROCEDURE — 77085 DXA BONE DENSITY AXL VRT FX: CPT

## 2024-01-08 ENCOUNTER — TELEPHONE (OUTPATIENT)
Dept: PRIMARY CARE | Facility: CLINIC | Age: 69
End: 2024-01-08
Payer: MEDICARE

## 2024-01-19 ENCOUNTER — HOSPITAL ENCOUNTER (EMERGENCY)
Facility: HOSPITAL | Age: 69
Discharge: HOME | End: 2024-01-19
Payer: MEDICARE

## 2024-01-19 VITALS
SYSTOLIC BLOOD PRESSURE: 139 MMHG | OXYGEN SATURATION: 97 % | RESPIRATION RATE: 18 BRPM | HEART RATE: 91 BPM | DIASTOLIC BLOOD PRESSURE: 91 MMHG | WEIGHT: 193 LBS | BODY MASS INDEX: 35.51 KG/M2 | TEMPERATURE: 98.4 F | HEIGHT: 62 IN

## 2024-01-19 DIAGNOSIS — B34.9 VIRAL SYNDROME: ICD-10-CM

## 2024-01-19 DIAGNOSIS — M79.10 MYALGIA: Primary | ICD-10-CM

## 2024-01-19 LAB
ANION GAP SERPL CALC-SCNC: 11 MMOL/L
BASOPHILS # BLD AUTO: 0.04 X10*3/UL (ref 0–0.1)
BASOPHILS NFR BLD AUTO: 0.3 %
BUN SERPL-MCNC: 20 MG/DL (ref 8–25)
BURR CELLS BLD QL SMEAR: NORMAL
CALCIUM SERPL-MCNC: 9 MG/DL (ref 8.5–10.4)
CHLORIDE SERPL-SCNC: 102 MMOL/L (ref 97–107)
CK SERPL-CCNC: 160 U/L (ref 24–195)
CO2 SERPL-SCNC: 22 MMOL/L (ref 24–31)
CREAT SERPL-MCNC: 1.5 MG/DL (ref 0.4–1.6)
EGFRCR SERPLBLD CKD-EPI 2021: 38 ML/MIN/1.73M*2
EOSINOPHIL # BLD AUTO: 0.11 X10*3/UL (ref 0–0.7)
EOSINOPHIL NFR BLD AUTO: 0.9 %
ERYTHROCYTE [DISTWIDTH] IN BLOOD BY AUTOMATED COUNT: 22.8 % (ref 11.5–14.5)
FLUAV RNA RESP QL NAA+PROBE: NOT DETECTED
FLUBV RNA RESP QL NAA+PROBE: NOT DETECTED
GLUCOSE SERPL-MCNC: 148 MG/DL (ref 65–99)
HCT VFR BLD AUTO: 36.1 % (ref 36–46)
HGB BLD-MCNC: 10.9 G/DL (ref 12–16)
IMM GRANULOCYTES # BLD AUTO: 0.04 X10*3/UL (ref 0–0.7)
IMM GRANULOCYTES NFR BLD AUTO: 0.3 % (ref 0–0.9)
LYMPHOCYTES # BLD AUTO: 1.35 X10*3/UL (ref 1.2–4.8)
LYMPHOCYTES NFR BLD AUTO: 11 %
MCH RBC QN AUTO: 22.8 PG (ref 26–34)
MCHC RBC AUTO-ENTMCNC: 30.2 G/DL (ref 32–36)
MCV RBC AUTO: 76 FL (ref 80–100)
MONOCYTES # BLD AUTO: 0.81 X10*3/UL (ref 0.1–1)
MONOCYTES NFR BLD AUTO: 6.6 %
NEUTROPHILS # BLD AUTO: 9.89 X10*3/UL (ref 1.2–7.7)
NEUTROPHILS NFR BLD AUTO: 80.9 %
NRBC BLD-RTO: 0 /100 WBCS (ref 0–0)
OVALOCYTES BLD QL SMEAR: NORMAL
PLATELET # BLD AUTO: 279 X10*3/UL (ref 150–450)
POTASSIUM SERPL-SCNC: 3.7 MMOL/L (ref 3.4–5.1)
RBC # BLD AUTO: 4.78 X10*6/UL (ref 4–5.2)
RBC MORPH BLD: NORMAL
RSV RNA RESP QL NAA+PROBE: NOT DETECTED
SARS-COV-2 RNA RESP QL NAA+PROBE: NOT DETECTED
SODIUM SERPL-SCNC: 135 MMOL/L (ref 133–145)
WBC # BLD AUTO: 12.2 X10*3/UL (ref 4.4–11.3)

## 2024-01-19 PROCEDURE — 87637 SARSCOV2&INF A&B&RSV AMP PRB: CPT | Performed by: PHYSICIAN ASSISTANT

## 2024-01-19 PROCEDURE — 96374 THER/PROPH/DIAG INJ IV PUSH: CPT

## 2024-01-19 PROCEDURE — 99284 EMERGENCY DEPT VISIT MOD MDM: CPT

## 2024-01-19 PROCEDURE — 82550 ASSAY OF CK (CPK): CPT | Performed by: PHYSICIAN ASSISTANT

## 2024-01-19 PROCEDURE — 80048 BASIC METABOLIC PNL TOTAL CA: CPT | Performed by: PHYSICIAN ASSISTANT

## 2024-01-19 PROCEDURE — 85025 COMPLETE CBC W/AUTO DIFF WBC: CPT | Performed by: PHYSICIAN ASSISTANT

## 2024-01-19 PROCEDURE — 36415 COLL VENOUS BLD VENIPUNCTURE: CPT | Performed by: PHYSICIAN ASSISTANT

## 2024-01-19 PROCEDURE — 2500000004 HC RX 250 GENERAL PHARMACY W/ HCPCS (ALT 636 FOR OP/ED): Performed by: PHYSICIAN ASSISTANT

## 2024-01-19 PROCEDURE — 96361 HYDRATE IV INFUSION ADD-ON: CPT

## 2024-01-19 RX ORDER — HYDROCODONE BITARTRATE AND ACETAMINOPHEN 5; 325 MG/1; MG/1
1 TABLET ORAL EVERY 6 HOURS PRN
Qty: 10 TABLET | Refills: 0 | Status: SHIPPED | OUTPATIENT
Start: 2024-01-19 | End: 2024-01-29 | Stop reason: WASHOUT

## 2024-01-19 RX ORDER — HYDROCODONE BITARTRATE AND ACETAMINOPHEN 5; 325 MG/1; MG/1
1 TABLET ORAL EVERY 6 HOURS PRN
Qty: 12 TABLET | Refills: 0 | Status: SHIPPED | OUTPATIENT
Start: 2024-01-19 | End: 2024-01-29 | Stop reason: WASHOUT

## 2024-01-19 RX ORDER — KETOROLAC TROMETHAMINE 30 MG/ML
15 INJECTION, SOLUTION INTRAMUSCULAR; INTRAVENOUS ONCE
Status: COMPLETED | OUTPATIENT
Start: 2024-01-19 | End: 2024-01-19

## 2024-01-19 RX ADMIN — SODIUM CHLORIDE, SODIUM LACTATE, POTASSIUM CHLORIDE, AND CALCIUM CHLORIDE 1000 ML: 600; 310; 30; 20 INJECTION, SOLUTION INTRAVENOUS at 20:49

## 2024-01-19 RX ADMIN — KETOROLAC TROMETHAMINE 15 MG: 30 INJECTION INTRAMUSCULAR; INTRAVENOUS at 20:47

## 2024-01-19 ASSESSMENT — PAIN SCALES - GENERAL
PAINLEVEL_OUTOF10: 7
PAINLEVEL_OUTOF10: 7

## 2024-01-19 ASSESSMENT — LIFESTYLE VARIABLES
REASON UNABLE TO ASSESS: NO
EVER FELT BAD OR GUILTY ABOUT YOUR DRINKING: NO
HAVE PEOPLE ANNOYED YOU BY CRITICIZING YOUR DRINKING: NO
EVER HAD A DRINK FIRST THING IN THE MORNING TO STEADY YOUR NERVES TO GET RID OF A HANGOVER: NO
HAVE YOU EVER FELT YOU SHOULD CUT DOWN ON YOUR DRINKING: NO

## 2024-01-19 ASSESSMENT — PAIN - FUNCTIONAL ASSESSMENT
PAIN_FUNCTIONAL_ASSESSMENT: 0-10
PAIN_FUNCTIONAL_ASSESSMENT: 0-10

## 2024-01-20 NOTE — ED PROVIDER NOTES
HPI   No chief complaint on file.      68-year-old female presented emergency department with a chief complaint of diffuse bodyaches and chills.  She complains of aching pain all over.  Symptoms been for 2 days.  She did a colonoscopy prep last night and subsequently had an endoscopy colonoscopy today.  She is well-appearing nontoxic afebrile.  Denies lightheadedness dizziness numbness weakness.  Took nothing for relief.  Denies vomiting diarrhea.  Denies cough shortness of breath.  Complains of chills.  No other complaint.                          No data recorded                Patient History   Past Medical History:   Diagnosis Date    Encounter for full-term uncomplicated delivery      (spontaneous vaginal delivery)    Fibromyalgia 2022    Fibromyalgia    Obstructive sleep apnea (adult) (pediatric)     Obstructive sleep apnea    Other chronic pain     Chronic pain    Other conditions influencing health status     Cystocele    Other symptoms and signs involving the nervous system 2021    Suspected sleep apnea    Personal history of diseases of the blood and blood-forming organs and certain disorders involving the immune mechanism     History of anemia    Personal history of other diseases of the circulatory system 2020    History of hypertension    Personal history of other diseases of the musculoskeletal system and connective tissue 10/09/2019    History of fibromyositis    Personal history of other diseases of the musculoskeletal system and connective tissue 2020    History of arthritis    Personal history of other diseases of the musculoskeletal system and connective tissue     History of arthritis    Personal history of other diseases of the nervous system and sense organs     History of sleep disturbance    Personal history of other endocrine, nutritional and metabolic disease     History of hypothyroidism    Personal history of other infectious and parasitic diseases     History  of varicella    Rectocele     Rectocele    Spontaneous ecchymoses     Bruises easily     Past Surgical History:   Procedure Laterality Date    DILATION AND CURETTAGE OF UTERUS  09/04/2015    Dilation And Curettage    OTHER SURGICAL HISTORY  09/04/2015    Cholecystotomy    OTHER SURGICAL HISTORY  09/04/2015    Breast Surgery Puncture Aspiration Of Cyst     Family History   Problem Relation Name Age of Onset    Diabetes Mother      Fibromyalgia Mother      Other (MI) Mother      Other (cerebrovascular accident) Father      Heart attack Father      Other (anxiety and depression) Sister      Other (anxiety and depression) Brother      Diabetes Brother      Other (MI) Brother      Breast cancer Maternal Grandmother  50    Other (cardiac disorder) Other      Other (htn) Other      Diabetes Other aunt     Other (MI) Other GM     Other (MI [Other]) Other GF      Social History     Tobacco Use    Smoking status: Never    Smokeless tobacco: Never   Vaping Use    Vaping Use: Never used   Substance Use Topics    Alcohol use: Not Currently    Drug use: Not Currently       Physical Exam   ED Triage Vitals [01/19/24 2032]   Temp Heart Rate Respirations BP   37 °C (98.6 °F) 96 18 145/83      Pulse Ox Temp Source Heart Rate Source Patient Position   96 % Oral Monitor --      BP Location FiO2 (%)     -- --       Physical Exam  Vitals and nursing note reviewed.   Constitutional:       Appearance: Normal appearance.   HENT:      Head: Normocephalic and atraumatic.      Nose: Nose normal.      Mouth/Throat:      Mouth: Mucous membranes are moist.   Cardiovascular:      Rate and Rhythm: Normal rate and regular rhythm.   Pulmonary:      Effort: Pulmonary effort is normal.      Breath sounds: Normal breath sounds.   Abdominal:      General: Abdomen is flat.      Palpations: Abdomen is soft.   Musculoskeletal:         General: Normal range of motion.   Skin:     General: Skin is warm and dry.   Neurological:      General: No focal deficit  present.      Mental Status: She is alert and oriented to person, place, and time.   Psychiatric:         Mood and Affect: Mood normal.         ED Course & MDM   Diagnoses as of 01/19/24 2144   Myalgia   Viral syndrome       Medical Decision Making  I have seen and evaluated the patient.  Consultation with attending physician was obtained.  They are in agreement with plan of care and disposition.  I have seen and evaluated this patient.  Physician available for consultation.  Vital signs have been reviewed.  All laboratory and diagnostic imaging is reviewed by myself and interpreted by myself unless otherwise stated.  Additionally imaging is interpreted by radiologist.    CBC without significant leukocytosis or anemia, metabolic panel without significant renal impairment or electrolyte abnormality.  CK negative.  No abdominal pain.  Benign abdominal exam.  Overall impression is this represents a viral syndrome.  Did consult with physician who is in agreement plan of care.  Placed on pain control medication and released in good condition with primary follow-up.      Labs Reviewed   CBC WITH AUTO DIFFERENTIAL - Abnormal       Result Value    WBC 12.2 (*)     nRBC 0.0      RBC 4.78      Hemoglobin 10.9 (*)     Hematocrit 36.1      MCV 76 (*)     MCH 22.8 (*)     MCHC 30.2 (*)     RDW 22.8 (*)     Platelets 279      Neutrophils % 80.9      Immature Granulocytes %, Automated 0.3      Lymphocytes % 11.0      Monocytes % 6.6      Eosinophils % 0.9      Basophils % 0.3      Neutrophils Absolute 9.89 (*)     Immature Granulocytes Absolute, Automated 0.04      Lymphocytes Absolute 1.35      Monocytes Absolute 0.81      Eosinophils Absolute 0.11      Basophils Absolute 0.04     BASIC METABOLIC PANEL - Abnormal    Glucose 148 (*)     Sodium 135      Potassium 3.7      Chloride 102      Bicarbonate 22 (*)     Urea Nitrogen 20      Creatinine 1.50      eGFR 38 (*)     Calcium 9.0      Anion Gap 11     SARS-COV-2 AND INFLUENZA A/B  PCR - Normal    Flu A Result Not Detected      Flu B Result Not Detected      Coronavirus 2019, PCR Not Detected      Narrative:     This assay has received FDA Emergency Use Authorization (EUA) and  is only authorized for the duration of time that circumstances exist to justify the authorization of the emergency use of in vitro diagnostic tests for the detection of SARS-CoV-2 virus and/or diagnosis of COVID-19 infection under section 564(b)(1) of the Act, 21 U.S.C. 360bbb-3(b)(1). Testing for SARS-CoV-2 is only recommended for patients who meet current clinical and/or epidemiological criteria as defined by federal, state, or local public health directives. This assay is an in vitro diagnostic nucleic acid amplification test for the qualitative detection of SARS-CoV-2, Influenza A, and Influenza B from nasopharyngeal specimens and has been validated for use at [facility]. Negative results do not preclude COVID-19 infections or Influenza A/B infections, and should not be used as the sole basis for diagnosis, treatment, or other management decisions. If Influenza A/B and RSV PCR results are negative, testing for Parainfluenza virus, Adenovirus and Metapneumovirus is routinely performed for [facility] pediatric oncology and intensive care inpatients, and is available on other patients by placing an add-on request.    RSV PCR - Normal    RSV PCR Not Detected      Narrative:     This assay is an FDA-cleared, in vitro diagnostic nucleic acid amplification test for the detection of RSV from nasopharyngeal specimens, and has been validated for use at [facility]. Negative results do not preclude RSV infections, and should not be used as the sole basis for diagnosis, treatment, or other management decisions. If Influenza A/B and RSV PCR results are negative, testing for Parainfluenza virus, Adenovirus and Metapneumovirus is routinely performed for pediatric oncology and intensive care  inpatients at [facility], and is available on other patients by placing an add-on request.       CREATINE KINASE - Normal    Creatine Kinase 160     URINALYSIS WITH REFLEX CULTURE AND MICROSCOPIC    Narrative:     The following orders were created for panel order Urinalysis with Reflex Culture and Microscopic.  Procedure                               Abnormality         Status                     ---------                               -----------         ------                     Urinalysis with Reflex C...[[id number]]                                                 Extra Urine Gray Tube[[id number]]                                                         Please view results for these tests on the individual orders.   URINALYSIS WITH REFLEX CULTURE AND MICROSCOPIC   EXTRA URINE GRAY TUBE   MORPHOLOGY    RBC Morphology See Below      Ovalocytes Few      Sedrick Cells Few       No orders to display     Medications   lactated Ringer's bolus 1,000 mL (1,000 mL intravenous New Bag 1/19/24 2049)   ketorolac (Toradol) injection 15 mg (15 mg intravenous Given 1/19/24 2047)     New Prescriptions    HYDROCODONE-ACETAMINOPHEN (NORCO) 5-325 MG TABLET    Take 1 tablet by mouth every 6 hours if needed for severe pain (7 - 10) for up to 12 doses.         Procedure  Procedures     Ephraim Briscoe PA-C  01/19/24 2847

## 2024-01-24 ENCOUNTER — APPOINTMENT (OUTPATIENT)
Dept: PRIMARY CARE | Facility: CLINIC | Age: 69
End: 2024-01-24
Payer: MEDICARE

## 2024-01-29 ENCOUNTER — OFFICE VISIT (OUTPATIENT)
Dept: PRIMARY CARE | Facility: CLINIC | Age: 69
End: 2024-01-29
Payer: MEDICARE

## 2024-01-29 VITALS
WEIGHT: 202 LBS | HEART RATE: 61 BPM | BODY MASS INDEX: 37.17 KG/M2 | DIASTOLIC BLOOD PRESSURE: 58 MMHG | RESPIRATION RATE: 17 BRPM | OXYGEN SATURATION: 96 % | HEIGHT: 62 IN | SYSTOLIC BLOOD PRESSURE: 126 MMHG

## 2024-01-29 DIAGNOSIS — Z86.39 H/O: OBESITY: ICD-10-CM

## 2024-01-29 DIAGNOSIS — E66.01 OBESITY, MORBID (MULTI): ICD-10-CM

## 2024-01-29 DIAGNOSIS — K92.1 HEMATOCHEZIA: Primary | ICD-10-CM

## 2024-01-29 PROBLEM — R07.9 CHEST PAIN: Status: RESOLVED | Noted: 2023-02-13 | Resolved: 2024-01-29

## 2024-01-29 PROBLEM — J32.9 CHRONIC SINUSITIS: Status: RESOLVED | Noted: 2023-02-13 | Resolved: 2024-01-29

## 2024-01-29 PROCEDURE — 1157F ADVNC CARE PLAN IN RCRD: CPT | Performed by: STUDENT IN AN ORGANIZED HEALTH CARE EDUCATION/TRAINING PROGRAM

## 2024-01-29 PROCEDURE — 1036F TOBACCO NON-USER: CPT | Performed by: STUDENT IN AN ORGANIZED HEALTH CARE EDUCATION/TRAINING PROGRAM

## 2024-01-29 PROCEDURE — 1123F ACP DISCUSS/DSCN MKR DOCD: CPT | Performed by: STUDENT IN AN ORGANIZED HEALTH CARE EDUCATION/TRAINING PROGRAM

## 2024-01-29 PROCEDURE — 3074F SYST BP LT 130 MM HG: CPT | Performed by: STUDENT IN AN ORGANIZED HEALTH CARE EDUCATION/TRAINING PROGRAM

## 2024-01-29 PROCEDURE — 3078F DIAST BP <80 MM HG: CPT | Performed by: STUDENT IN AN ORGANIZED HEALTH CARE EDUCATION/TRAINING PROGRAM

## 2024-01-29 PROCEDURE — 1126F AMNT PAIN NOTED NONE PRSNT: CPT | Performed by: STUDENT IN AN ORGANIZED HEALTH CARE EDUCATION/TRAINING PROGRAM

## 2024-01-29 PROCEDURE — 1159F MED LIST DOCD IN RCRD: CPT | Performed by: STUDENT IN AN ORGANIZED HEALTH CARE EDUCATION/TRAINING PROGRAM

## 2024-01-29 PROCEDURE — 99214 OFFICE O/P EST MOD 30 MIN: CPT | Performed by: STUDENT IN AN ORGANIZED HEALTH CARE EDUCATION/TRAINING PROGRAM

## 2024-01-29 PROCEDURE — 1160F RVW MEDS BY RX/DR IN RCRD: CPT | Performed by: STUDENT IN AN ORGANIZED HEALTH CARE EDUCATION/TRAINING PROGRAM

## 2024-01-29 ASSESSMENT — PATIENT HEALTH QUESTIONNAIRE - PHQ9
1. LITTLE INTEREST OR PLEASURE IN DOING THINGS: NOT AT ALL
SUM OF ALL RESPONSES TO PHQ9 QUESTIONS 1 AND 2: 0
2. FEELING DOWN, DEPRESSED OR HOPELESS: NOT AT ALL

## 2024-01-30 DIAGNOSIS — K21.9 HIATAL HERNIA WITH GERD: ICD-10-CM

## 2024-01-30 DIAGNOSIS — K44.9 HIATAL HERNIA WITH GERD: ICD-10-CM

## 2024-01-30 RX ORDER — PANTOPRAZOLE SODIUM 40 MG/1
40 TABLET, DELAYED RELEASE ORAL DAILY
Qty: 90 TABLET | Refills: 1 | Status: SHIPPED | OUTPATIENT
Start: 2024-01-30 | End: 2024-02-01

## 2024-01-31 ENCOUNTER — LAB (OUTPATIENT)
Dept: LAB | Facility: LAB | Age: 69
End: 2024-01-31
Payer: MEDICARE

## 2024-01-31 DIAGNOSIS — K92.1 HEMATOCHEZIA: ICD-10-CM

## 2024-01-31 DIAGNOSIS — E66.01 OBESITY, MORBID (MULTI): ICD-10-CM

## 2024-01-31 DIAGNOSIS — Z86.39 H/O: OBESITY: ICD-10-CM

## 2024-01-31 LAB
BASOPHILS # BLD AUTO: 0.05 X10*3/UL (ref 0–0.1)
BASOPHILS NFR BLD AUTO: 0.6 %
BURR CELLS BLD QL SMEAR: NORMAL
EOSINOPHIL # BLD AUTO: 0.36 X10*3/UL (ref 0–0.7)
EOSINOPHIL NFR BLD AUTO: 4.4 %
ERYTHROCYTE [DISTWIDTH] IN BLOOD BY AUTOMATED COUNT: 21.6 % (ref 11.5–14.5)
EST. AVERAGE GLUCOSE BLD GHB EST-MCNC: 120 MG/DL
HBA1C MFR BLD: 5.8 %
HCT VFR BLD AUTO: 36 % (ref 36–46)
HGB BLD-MCNC: 11 G/DL (ref 12–16)
IMM GRANULOCYTES # BLD AUTO: 0.02 X10*3/UL (ref 0–0.7)
IMM GRANULOCYTES NFR BLD AUTO: 0.2 % (ref 0–0.9)
LYMPHOCYTES # BLD AUTO: 2.74 X10*3/UL (ref 1.2–4.8)
LYMPHOCYTES NFR BLD AUTO: 33.3 %
MCH RBC QN AUTO: 23 PG (ref 26–34)
MCHC RBC AUTO-ENTMCNC: 30.6 G/DL (ref 32–36)
MCV RBC AUTO: 75 FL (ref 80–100)
MONOCYTES # BLD AUTO: 0.73 X10*3/UL (ref 0.1–1)
MONOCYTES NFR BLD AUTO: 8.9 %
NEUTROPHILS # BLD AUTO: 4.32 X10*3/UL (ref 1.2–7.7)
NEUTROPHILS NFR BLD AUTO: 52.6 %
NRBC BLD-RTO: 0 /100 WBCS (ref 0–0)
OVALOCYTES BLD QL SMEAR: NORMAL
PLATELET # BLD AUTO: 423 X10*3/UL (ref 150–450)
RBC # BLD AUTO: 4.78 X10*6/UL (ref 4–5.2)
RBC MORPH BLD: NORMAL
SCHISTOCYTES BLD QL SMEAR: NORMAL
TARGETS BLD QL SMEAR: NORMAL
WBC # BLD AUTO: 8.2 X10*3/UL (ref 4.4–11.3)

## 2024-01-31 PROCEDURE — 85025 COMPLETE CBC W/AUTO DIFF WBC: CPT

## 2024-01-31 PROCEDURE — 36415 COLL VENOUS BLD VENIPUNCTURE: CPT

## 2024-01-31 PROCEDURE — 83036 HEMOGLOBIN GLYCOSYLATED A1C: CPT

## 2024-01-31 NOTE — PROGRESS NOTES
EXCISION AND CLOSURE  Risks, benefits, alternatives and personnel were discussed. Informed consent obtained for excision and closure. Patient wants to proceed with the procedure.  Informed consent signed.  Audible time out: yes    Dx: D48.5    Location:  Left scalp  Lymphadenopathy: none  Pre-op size: 2.5 cm x 2.5 cm   Final Length: 2.5 cm  Suture Removal: 7-10 days    Local Anesthetic:  3 cc 1% Xylocaine with Epinephrine 1:100,000  The patient was placed in the Supine position.   The operative field was cleaned and prepped with alcohol.  A sterile skin marking pen was used to draw the proposed excision.  The skin surrounding the lesion was infiltrated with local anesthetic. Hibiclens was applied to area. A sterile drape was applied to provide a clean working environment.  Procedure:  The lesion was EXCISED using a #15 blade. The wound edges were undermined into the subcutaneous plane to facilitate closure and minimize distortion of adjacent structures.  Hemostasis was achieved with cautery.  Depth: Subcutis  Closure: Primary  Type: linear    The wound edges were approximated w/ interrupted  3-0, Prolene suture  Dressing: Vaseline and a sterile pressure dressing was applied to the wound.  EBL:  Minimal         Complications:  none      Disposition of Specimen:  One specimen was sent for pathological examination in 10% buffered formalin.    The patient will be notified of the pathology results or they will contact us within 2 weeks.     Our contact office number was also given on the wound care sheet should questions arise regarding the procedure, healing process or results.    Disposition:  Patient was discharged home in good condition with verbal and written wound care instructions.      Return for follow up in: 7- 10 days for suture removal    The documentation recorded by the riki Nguyen accurately and completely reflects the service(s) I personally performed and the decisions made by jasmeet Merida  [id number]   Patient ID: Annie Joseph is a 68 y.o. female who presents for Follow-up (Pt is here for a  6 month follow up.).    Interval history:  Patient was evaluated at the [facility] emergency [place] on 1/19/2024 for myalgias post her colonoscopy and endoscopy  Pt was adament that her reaction to remy-tab  Patient states that during her prep, she had severe nausea and vomiting.  She called the GI physician who did her colonoscopy and an EGD and was advised to take Zofran and Reglan to complete her prep.  She completed her prep and was able to obtain her procedures.  However, she was noted to have worsening myalgias and spastic motions of her legs and hands.  She presented to the emergency department for further evaluation.  She was negative for any acute electrolyte imbalances.  Her imaging was negative and she was discharged with 12 tabs of Norco for viral syndrome.  Since her discharge his symptoms did persist for the next 2 to 3 days however they did resolve.  She also endorses continued bleeding rectally since her colonoscopy.          Objective   Physical Exam  Vitals reviewed.   Constitutional:       Appearance: Normal appearance.   Cardiovascular:      Rate and Rhythm: Normal rate and regular rhythm.      Heart sounds: No murmur heard.  Pulmonary:      Effort: Pulmonary effort is normal. No respiratory distress.      Breath sounds: Normal breath sounds. No wheezing.   Musculoskeletal:      Cervical back: Neck supple.      Left lower leg: No edema.   Neurological:      Mental Status: She is alert.         Assessment/Plan      Annie Joseph is a 68 year old female who presents to the office for follow up.    ED follow up   Improved symptoms   Continued bleeding since the biopsy     Anemia   Concerns for persist bleeding post   has been gradually worsening.  Patient most recent hemoglobin approximately 10  This is likely contributing to her chronic fatigue  Patient denies any GI bleeding.  EGD and  MD Dae  [phone]    I, Maxwell Nguyen, [facility], attest that I performed the duties of a scribe for this entire encounter in the presence of Dr. Pearl       colonoscopy results pending   Stool studies were pending patient drop off  Continued 325 mg daily recheck iron studies in 3 months    HTN  Improved  126/58mmHg  Continued amlodipine 10 mg   Continued Valsartan 160 mg daily   Follow-up with cardiology  Physical exam was stable. Discussed maintaining diets such as DASH to help maintain normal Blood pressure. Encouraged regular exercise for a total of 120 min weekly. We will fu labs in 1-3 days. For now there will be no change in medical management. All questions and concerns were addressed. Pt will follow up in 4-6 months or sooner if needed.        Slime Mcdermott DO 01/29/24 5:11 PM

## 2024-02-01 ENCOUNTER — HOSPITAL ENCOUNTER (EMERGENCY)
Facility: HOSPITAL | Age: 69
Discharge: HOME | End: 2024-02-01
Attending: EMERGENCY MEDICINE
Payer: MEDICARE

## 2024-02-01 VITALS
SYSTOLIC BLOOD PRESSURE: 128 MMHG | OXYGEN SATURATION: 99 % | DIASTOLIC BLOOD PRESSURE: 60 MMHG | TEMPERATURE: 97.9 F | HEART RATE: 77 BPM | HEIGHT: 62 IN | WEIGHT: 195 LBS | RESPIRATION RATE: 18 BRPM | BODY MASS INDEX: 35.88 KG/M2

## 2024-02-01 DIAGNOSIS — K62.5 RECTAL BLEEDING: Primary | ICD-10-CM

## 2024-02-01 DIAGNOSIS — M79.7 FIBROMYALGIA: Primary | ICD-10-CM

## 2024-02-01 DIAGNOSIS — K44.9 HIATAL HERNIA WITH GERD: ICD-10-CM

## 2024-02-01 DIAGNOSIS — K21.9 HIATAL HERNIA WITH GERD: ICD-10-CM

## 2024-02-01 DIAGNOSIS — M79.10 MYALGIA: Primary | ICD-10-CM

## 2024-02-01 LAB
ALBUMIN SERPL-MCNC: 3.9 G/DL (ref 3.5–5)
ALP BLD-CCNC: 67 U/L (ref 35–125)
ALT SERPL-CCNC: 18 U/L (ref 5–40)
ANION GAP SERPL CALC-SCNC: 10 MMOL/L
AST SERPL-CCNC: 16 U/L (ref 5–40)
BASOPHILS # BLD AUTO: 0.08 X10*3/UL (ref 0–0.1)
BASOPHILS NFR BLD AUTO: 0.9 %
BILIRUB SERPL-MCNC: <0.2 MG/DL (ref 0.1–1.2)
BUN SERPL-MCNC: 21 MG/DL (ref 8–25)
CALCIUM SERPL-MCNC: 9.1 MG/DL (ref 8.5–10.4)
CHLORIDE SERPL-SCNC: 102 MMOL/L (ref 97–107)
CO2 SERPL-SCNC: 25 MMOL/L (ref 24–31)
CREAT SERPL-MCNC: 1.3 MG/DL (ref 0.4–1.6)
EGFRCR SERPLBLD CKD-EPI 2021: 45 ML/MIN/1.73M*2
EOSINOPHIL # BLD AUTO: 0.4 X10*3/UL (ref 0–0.7)
EOSINOPHIL NFR BLD AUTO: 4.6 %
ERYTHROCYTE [DISTWIDTH] IN BLOOD BY AUTOMATED COUNT: 21.9 % (ref 11.5–14.5)
GLUCOSE SERPL-MCNC: 93 MG/DL (ref 65–99)
HCT VFR BLD AUTO: 35.1 % (ref 36–46)
HEMOCCULT SP1 STL QL: POSITIVE
HGB BLD-MCNC: 10.7 G/DL (ref 12–16)
IMM GRANULOCYTES # BLD AUTO: 0.04 X10*3/UL (ref 0–0.7)
IMM GRANULOCYTES NFR BLD AUTO: 0.5 % (ref 0–0.9)
LIPASE SERPL-CCNC: 51 U/L (ref 16–63)
LYMPHOCYTES # BLD AUTO: 2.79 X10*3/UL (ref 1.2–4.8)
LYMPHOCYTES NFR BLD AUTO: 32.2 %
MCH RBC QN AUTO: 23.1 PG (ref 26–34)
MCHC RBC AUTO-ENTMCNC: 30.5 G/DL (ref 32–36)
MCV RBC AUTO: 76 FL (ref 80–100)
MONOCYTES # BLD AUTO: 0.7 X10*3/UL (ref 0.1–1)
MONOCYTES NFR BLD AUTO: 8.1 %
NEUTROPHILS # BLD AUTO: 4.66 X10*3/UL (ref 1.2–7.7)
NEUTROPHILS NFR BLD AUTO: 53.7 %
NRBC BLD-RTO: 0 /100 WBCS (ref 0–0)
OVALOCYTES BLD QL SMEAR: NORMAL
PLATELET # BLD AUTO: 417 X10*3/UL (ref 150–450)
POTASSIUM SERPL-SCNC: 4.2 MMOL/L (ref 3.4–5.1)
PROT SERPL-MCNC: 6.6 G/DL (ref 5.9–7.9)
RBC # BLD AUTO: 4.64 X10*6/UL (ref 4–5.2)
RBC MORPH BLD: NORMAL
SCHISTOCYTES BLD QL SMEAR: NORMAL
SODIUM SERPL-SCNC: 137 MMOL/L (ref 133–145)
TARGETS BLD QL SMEAR: NORMAL
WBC # BLD AUTO: 8.7 X10*3/UL (ref 4.4–11.3)

## 2024-02-01 PROCEDURE — 36415 COLL VENOUS BLD VENIPUNCTURE: CPT | Performed by: EMERGENCY MEDICINE

## 2024-02-01 PROCEDURE — 99283 EMERGENCY DEPT VISIT LOW MDM: CPT

## 2024-02-01 PROCEDURE — 99284 EMERGENCY DEPT VISIT MOD MDM: CPT | Performed by: EMERGENCY MEDICINE

## 2024-02-01 PROCEDURE — 80053 COMPREHEN METABOLIC PANEL: CPT | Performed by: EMERGENCY MEDICINE

## 2024-02-01 PROCEDURE — 83690 ASSAY OF LIPASE: CPT | Performed by: EMERGENCY MEDICINE

## 2024-02-01 PROCEDURE — 82270 OCCULT BLOOD FECES: CPT | Performed by: EMERGENCY MEDICINE

## 2024-02-01 PROCEDURE — 85025 COMPLETE CBC W/AUTO DIFF WBC: CPT | Performed by: EMERGENCY MEDICINE

## 2024-02-01 RX ORDER — DULOXETIN HYDROCHLORIDE 30 MG/1
60 CAPSULE, DELAYED RELEASE ORAL
COMMUNITY
Start: 2016-08-25 | End: 2024-02-21 | Stop reason: DRUGHIGH

## 2024-02-01 RX ORDER — DOCUSATE SODIUM 100 MG/1
100 CAPSULE, LIQUID FILLED ORAL EVERY 12 HOURS
Qty: 60 CAPSULE | Refills: 0 | Status: SHIPPED | OUTPATIENT
Start: 2024-02-01 | End: 2024-03-02

## 2024-02-01 RX ORDER — PANTOPRAZOLE SODIUM 40 MG/1
40 TABLET, DELAYED RELEASE ORAL DAILY
Qty: 90 TABLET | Refills: 0 | Status: SHIPPED | OUTPATIENT
Start: 2024-02-01

## 2024-02-01 RX ORDER — DULOXETIN HYDROCHLORIDE 30 MG/1
60 CAPSULE, DELAYED RELEASE ORAL
OUTPATIENT
Start: 2024-02-01

## 2024-02-01 RX ORDER — DULOXETIN HYDROCHLORIDE 60 MG/1
60 CAPSULE, DELAYED RELEASE ORAL DAILY
Qty: 90 CAPSULE | Refills: 0 | Status: SHIPPED | OUTPATIENT
Start: 2024-02-01 | End: 2024-05-16

## 2024-02-01 RX ORDER — HYDROCODONE BITARTRATE AND ACETAMINOPHEN 5; 325 MG/1; MG/1
1 TABLET ORAL EVERY 6 HOURS PRN
Qty: 12 TABLET | Refills: 0 | OUTPATIENT
Start: 2024-02-01

## 2024-02-01 ASSESSMENT — LIFESTYLE VARIABLES
HAVE YOU EVER FELT YOU SHOULD CUT DOWN ON YOUR DRINKING: NO
HAVE PEOPLE ANNOYED YOU BY CRITICIZING YOUR DRINKING: NO
EVER FELT BAD OR GUILTY ABOUT YOUR DRINKING: NO
EVER HAD A DRINK FIRST THING IN THE MORNING TO STEADY YOUR NERVES TO GET RID OF A HANGOVER: NO

## 2024-02-01 ASSESSMENT — PAIN - FUNCTIONAL ASSESSMENT: PAIN_FUNCTIONAL_ASSESSMENT: 0-10

## 2024-02-01 ASSESSMENT — PAIN SCALES - GENERAL
PAINLEVEL_OUTOF10: 0 - NO PAIN

## 2024-02-01 ASSESSMENT — COLUMBIA-SUICIDE SEVERITY RATING SCALE - C-SSRS
6. HAVE YOU EVER DONE ANYTHING, STARTED TO DO ANYTHING, OR PREPARED TO DO ANYTHING TO END YOUR LIFE?: NO
2. HAVE YOU ACTUALLY HAD ANY THOUGHTS OF KILLING YOURSELF?: NO
1. IN THE PAST MONTH, HAVE YOU WISHED YOU WERE DEAD OR WISHED YOU COULD GO TO SLEEP AND NOT WAKE UP?: NO

## 2024-02-01 ASSESSMENT — PAIN DESCRIPTION - PROGRESSION: CLINICAL_PROGRESSION: NOT CHANGED

## 2024-02-01 NOTE — ED PROVIDER NOTES
HPI   Chief Complaint   Patient presents with    Rectal Bleeding     After my colonoscopy I have been bleeding only when I wipe I notice the blood I have no rectal pain and I'm on plavix I have recent hx of anemia       This is a 68-year-old female with a history of coronary artery disease Parenta currently on Plavix) and colon polyps who had a colonoscopy approximately 2 weeks ago.  The patient is reporting that she has been having some rectal bleeding off and on for about 2 weeks.  The patient states she notices blood on the toilet paper when she wipes after bowel movements for the last 2 weeks.  The patient states she has been having some epigastric discomfort.  The patient states she does have dark stool however she is taking iron supplements.  The patient's gastroenterologist is Madhavi Hebert.  Patient states she had biopsies done on 2024 during her colonoscopy.  Patient denies any lightheadedness, chest pain, shortness of breath, fever, vomiting.                          Carito Coma Scale Score: 15                  Patient History   Past Medical History:   Diagnosis Date    Encounter for full-term uncomplicated delivery      (spontaneous vaginal delivery)    Fibromyalgia 2022    Fibromyalgia    Obstructive sleep apnea (adult) (pediatric)     Obstructive sleep apnea    Other chronic pain     Chronic pain    Other conditions influencing health status     Cystocele    Other symptoms and signs involving the nervous system 2021    Suspected sleep apnea    Personal history of diseases of the blood and blood-forming organs and certain disorders involving the immune mechanism     History of anemia    Personal history of other diseases of the circulatory system 2020    History of hypertension    Personal history of other diseases of the musculoskeletal system and connective tissue 10/09/2019    History of fibromyositis    Personal history of other diseases of the musculoskeletal system  and connective tissue 01/05/2020    History of arthritis    Personal history of other diseases of the musculoskeletal system and connective tissue     History of arthritis    Personal history of other diseases of the nervous system and sense organs     History of sleep disturbance    Personal history of other endocrine, nutritional and metabolic disease     History of hypothyroidism    Personal history of other infectious and parasitic diseases     History of varicella    Rectocele     Rectocele    Spontaneous ecchymoses     Bruises easily     Past Surgical History:   Procedure Laterality Date    DILATION AND CURETTAGE OF UTERUS  09/04/2015    Dilation And Curettage    OTHER SURGICAL HISTORY  09/04/2015    Cholecystotomy    OTHER SURGICAL HISTORY  09/04/2015    Breast Surgery Puncture Aspiration Of Cyst     Family History   Problem Relation Name Age of Onset    Diabetes Mother      Fibromyalgia Mother      Other (MI) Mother      Other (cerebrovascular accident) Father      Heart attack Father      Other (anxiety and depression) Sister      Other (anxiety and depression) Brother      Diabetes Brother      Other (MI) Brother      Breast cancer Maternal Grandmother  50    Other (cardiac disorder) Other      Other (htn) Other      Diabetes Other aunt     Other (MI) Other GM     Other (MI [Other]) Other GF      Social History     Tobacco Use    Smoking status: Never    Smokeless tobacco: Never   Vaping Use    Vaping Use: Never used   Substance Use Topics    Alcohol use: Not Currently    Drug use: Not Currently       Physical Exam   ED Triage Vitals [02/01/24 1359]   Temperature Heart Rate Respirations BP   36.6 °C (97.9 °F) 82 18 156/75      Pulse Ox Temp Source Heart Rate Source Patient Position   99 % Oral Monitor Sitting      BP Location FiO2 (%)     Left arm --       Physical Exam  Vitals and nursing note reviewed.   Constitutional:       General: She is not in acute distress.     Appearance: She is well-developed.       Comments: High BMI   HENT:      Head: Normocephalic and atraumatic.   Eyes:      Conjunctiva/sclera: Conjunctivae normal.      Comments: Sclera appear normal   Cardiovascular:      Rate and Rhythm: Normal rate and regular rhythm.      Heart sounds: No murmur heard.  Pulmonary:      Effort: Pulmonary effort is normal. No respiratory distress.      Breath sounds: Normal breath sounds.   Abdominal:      Palpations: Abdomen is soft.      Tenderness: There is no abdominal tenderness.   Genitourinary:     Comments: Rectal exam reveals some skin tags in the anal area.  There is no evidence of thrombosed hemorrhoid or deja rectal bleeding.  Digital rectal exam revealed no gross blood and minimal stool in the rectal vault.  Guaiac card was sent to the lab for analysis  Musculoskeletal:         General: No swelling.      Cervical back: Neck supple.   Skin:     General: Skin is warm and dry.      Capillary Refill: Capillary refill takes less than 2 seconds.   Neurological:      Mental Status: She is alert.   Psychiatric:         Mood and Affect: Mood normal.         ED Course & MDM   Diagnoses as of 02/01/24 1539   Rectal bleeding       Medical Decision Making  This is a 68-year-old female with a history of coronary artery disease (currently on Plavix) and colon polyps who had a colonoscopy approximately 2 weeks ago.  The patient is reporting that she has been having some rectal bleeding off and on for about 2 weeks.  The patient states she notices blood on the toilet paper when she wipes after bowel movements for the last 2 weeks.  The patient states she has been having some epigastric discomfort.  The patient states she does have dark stool however she is taking iron supplements.  The patient's gastroenterologist is Madhavi Hebert.  Patient states she had biopsies done on January 19, 2024 during her colonoscopy.  Patient denies any lightheadedness, chest pain, shortness of breath, fever, vomiting.  Physical exam showed  no gross rectal bleeding and no obvious blood on digital rectal exam.  There is no thrombosed hemorrhoids or evidence of bleeding external hemorrhoid.    Differential diagnosis includes rectal bleeding stable versus unstable  Plan will be to check baseline lab work including guaiac stool.  Orthostatic vital signs will also be ordered to assess her hemodynamic status    Hemoglobin was 10.7 hematocrit 35.1 prior hemoglobin was 10.9, 13 days ago.  Orthostatic vital signs were within normal limits  Hemoccult was trace positive  Placed a call to the patient's gastroenterologist Madhavi Westbrook leaving a message with the nurse manager.  I did speak with the nurse manager who stated they have no recommendations on stopping the Plavix, and would refer her current GI bleeding as well as decision to discontinue Plavix to the GI doctor on-call and cardiologist on-call to make these decisions.    The patients case was discussed with her cardiologist who recommended the patient at this point continue Plavix and did recommend a stool softener to help soften her stools    The patient will be discharged home          Procedure  Procedures     Vazquez Victor, DO  02/01/24 7217

## 2024-02-21 ENCOUNTER — OFFICE VISIT (OUTPATIENT)
Dept: RHEUMATOLOGY | Facility: CLINIC | Age: 69
End: 2024-02-21
Payer: MEDICARE

## 2024-02-21 VITALS — DIASTOLIC BLOOD PRESSURE: 69 MMHG | WEIGHT: 195 LBS | SYSTOLIC BLOOD PRESSURE: 133 MMHG | BODY MASS INDEX: 35.67 KG/M2

## 2024-02-21 DIAGNOSIS — M79.7 FIBROMYALGIA: Primary | ICD-10-CM

## 2024-02-21 PROCEDURE — 1126F AMNT PAIN NOTED NONE PRSNT: CPT | Performed by: INTERNAL MEDICINE

## 2024-02-21 PROCEDURE — 1123F ACP DISCUSS/DSCN MKR DOCD: CPT | Performed by: INTERNAL MEDICINE

## 2024-02-21 PROCEDURE — 99213 OFFICE O/P EST LOW 20 MIN: CPT | Performed by: INTERNAL MEDICINE

## 2024-02-21 PROCEDURE — 1159F MED LIST DOCD IN RCRD: CPT | Performed by: INTERNAL MEDICINE

## 2024-02-21 PROCEDURE — 3078F DIAST BP <80 MM HG: CPT | Performed by: INTERNAL MEDICINE

## 2024-02-21 PROCEDURE — 3075F SYST BP GE 130 - 139MM HG: CPT | Performed by: INTERNAL MEDICINE

## 2024-02-21 PROCEDURE — 1036F TOBACCO NON-USER: CPT | Performed by: INTERNAL MEDICINE

## 2024-02-21 PROCEDURE — 1160F RVW MEDS BY RX/DR IN RCRD: CPT | Performed by: INTERNAL MEDICINE

## 2024-02-21 PROCEDURE — 1157F ADVNC CARE PLAN IN RCRD: CPT | Performed by: INTERNAL MEDICINE

## 2024-02-21 RX ORDER — METHOCARBAMOL 500 MG/1
500 TABLET, FILM COATED ORAL NIGHTLY
Qty: 30 TABLET | Refills: 2 | Status: SHIPPED | OUTPATIENT
Start: 2024-02-21 | End: 2024-05-16

## 2024-02-21 NOTE — PATIENT INSTRUCTIONS
Take Cymbalta and methocarbamol as discussed.  Begin aquatic therapy.  Call me if any question.  Follow-up in 3 months.

## 2024-02-21 NOTE — PROGRESS NOTES
Subjective  . Annie Joseph is a 68 y.o. female who presents for Follow-up and Rheumatoid Arthritis.    HPI . 68-year-old female with history of fibromyalgia, HTN, GERD, IBS and vitamin D deficiency presented for follow-up.     She states that hands, shoulders, back and knees hurt most of the times.  Sometimes she has difficulty of maintaining sleep.  She has not noticed swelling of the joints.  She feels tired.  She uses BiPAP regularly.  She has been a lot of stress due to mother's illness.    She has not taking methocarbamol.    Review of Systems   All other systems reviewed and are negative.    Objective .   Blood pressure 133/69, weight 88.5 kg (195 lb).    Physical Exam.  Gen. AAO x3, NAD.  HEENT: No pallor or icterus, PERRLA, EOMI.  No cervical lymphadenopathy .  Skin: No rashes.  Heart: S1, S2/ RRR.   Lungs: CTA B.  Abdomen: Soft, NT/ND.  MSK: No.swelling or tenderness of the  upper or lower extremity joints with full ROM, Neck,spine and Beny SI with out tenderness.  Neuro:  Sensation to touch intact.Strength 5/5 throughout.   Psych:Appropriate mood and behavior  EXT: No edema      Assessment/Plan  . 68-year-old female with history of fibromyalgia, HTN, GERD, IBS and vitamin D deficiency presented for follow-up.     #1: Fibromyalgia.  -Continue Cymbalta 60 mg daily.  -Begin methocarbamol at bedtime.  -Begin aquatic therapy.    Follow-up in 3 months.     This note was partially generated using the Dragon Voice recognition system. There may be some incorrect wording, spelling and/or spelling errors or punctuation errors that were not corrected prior to committing the note to the medical record.    Problem List Items Addressed This Visit       Fibromyalgia - Primary    Relevant Medications    methocarbamol (Robaxin) 500 mg tablet       Active Ambulatory Problems     Diagnosis Date Noted    Abnormal glucose 02/13/2023    Arthritis of knee, left 02/13/2023    Cystocele, midline 02/13/2023    Dysphagia  02/13/2023    Hyperglycemia 02/13/2023    Fatigue 02/13/2023    Fibromyalgia 02/13/2023    Hiatal hernia with GERD 02/13/2023    Hypertension 02/13/2023    IBS (irritable bowel syndrome) 02/13/2023    Left knee pain 02/13/2023    Low back pain 02/13/2023    Mixed incontinence urge and stress 02/13/2023    Nevus 02/13/2023    OAB (overactive bladder) 02/13/2023    ROBERT (obstructive sleep apnea) 02/13/2023    Postmenopausal bleeding 02/13/2023    Urethral prolapse 02/13/2023    Uterine prolapse 02/13/2023    Vitamin D deficiency 02/13/2023    Urinary incontinence 02/13/2023    Fecal incontinence 02/13/2023    Chronic constipation 02/13/2023    Coronary artery disease involving native coronary artery of native heart without angina pectoris 02/13/2023    Hearing loss 02/13/2023    Renal mass, left 02/13/2023    Non-ST elevation myocardial infarction (NSTEMI) in recovery phase (CMS/[facility]) 02/13/2023    Excessive daytime sleepiness 07/20/2023    Knee pain 07/20/2023    Suspected sleep apnea 07/20/2023    Elevated blood sugar 07/20/2023    Central sleep apnea 07/20/2023    Obesity, morbid (CMS/[facility]) 11/29/2023    Stage 3a chronic kidney disease (CMS/[facility]) 11/29/2023    Iron deficiency anemia 11/29/2023     Resolved Ambulatory Problems     Diagnosis Date Noted    Chest pain 02/13/2023    Chronic sinusitis 02/13/2023    Condition not found 02/13/2023     Past Medical History:   Diagnosis Date    Encounter for full-term uncomplicated delivery     Obstructive sleep apnea (adult) (pediatric)     Other chronic pain     Other conditions influencing health status     Other symptoms and signs involving the nervous system 04/20/2021    Personal history of diseases of the blood and blood-forming organs and certain disorders involving the immune mechanism     Personal history of other diseases of the circulatory system 01/05/2020    Personal history of other diseases of the musculoskeletal system and connective tissue 10/09/2019     Personal history of other diseases of the musculoskeletal system and connective tissue 01/05/2020    Personal history of other diseases of the musculoskeletal system and connective tissue     Personal history of other diseases of the nervous system and sense organs     Personal history of other endocrine, nutritional and metabolic disease     Personal history of other infectious and parasitic diseases     Rectocele     Spontaneous ecchymoses        Family History   Problem Relation Name Age of Onset    Diabetes Mother      Fibromyalgia Mother      Other (MI) Mother      Other (cerebrovascular accident) Father      Heart attack Father      Other (anxiety and depression) Sister      Other (anxiety and depression) Brother      Diabetes Brother      Other (MI) Brother      Breast cancer Maternal Grandmother  50    Other (cardiac disorder) Other      Other (htn) Other      Diabetes Other aunt     Other (MI) Other GM     Other (MI [Other]) Other GF        Past Surgical History:   Procedure Laterality Date    DILATION AND CURETTAGE OF UTERUS  09/04/2015    Dilation And Curettage    OTHER SURGICAL HISTORY  09/04/2015    Cholecystotomy    OTHER SURGICAL HISTORY  09/04/2015    Breast Surgery Puncture Aspiration Of Cyst       Social History     Tobacco Use   Smoking Status Never   Smokeless Tobacco Never       Allergies  Atorvastatin, Clindamycin, Levaquin [levofloxacin], Polyglyceryl-6 polyricinoleate, and Sulfa (sulfonamide antibiotics)    Current Meds  Current Outpatient Medications   Medication Instructions    amLODIPine (Norvasc) 5 mg tablet 2 tablets, oral, Daily    aspirin 81 mg EC tablet 1 tablet, oral, Daily    calcium carbonate/vitamin D3 (CALCIUM 600 + D,3, ORAL) 600 mg, oral, 2 times daily    cholecalciferol (Vitamin D-3) 25 MCG (1000 UT) tablet oral    clopidogrel (Plavix) 75 mg tablet 1 tablet, oral, Daily    docusate sodium (COLACE) 100 mg, oral, Every 12 hours    DULoxetine (CYMBALTA) 60 mg, oral, Daily     ferrous sulfate, 325 mg ferrous sulfate, (FerrouSuL) tablet 1 tablet, oral, Daily with breakfast    fluticasone (Flonase) 50 mcg/actuation nasal spray 2 sprays, Each Nostril, Daily, Shake gently. Before first use, prime pump. After use, clean tip and replace cap.    isosorbide mononitrate ER (Imdur) 30 mg 24 hr tablet 1 tablet, oral, 2 times daily, Do not crush or chew.     magnesium glycinate 100 mg magnesium capsule oral    methocarbamol (ROBAXIN) 500 mg, oral, Nightly    nitroglycerin (Nitrostat) 0.4 mg SL tablet 1 tablet, sublingual, As needed    pantoprazole (PROTONIX) 40 mg, oral, Daily    valsartan (DIOVAN) 160 mg, oral, Daily, Pt doesn't know dose.        Lab Results   Component Value Date    RF 10 02/04/2020    SEDRATE 5 02/04/2020    CRP 0.20 02/04/2020    URICACID 5.3 02/04/2020    CKTOTAL 160 01/19/2024        Pankaj Jade MD

## 2024-03-25 ENCOUNTER — TELEPHONE (OUTPATIENT)
Dept: ALLERGY | Facility: CLINIC | Age: 69
End: 2024-03-25
Payer: MEDICARE

## 2024-03-25 DIAGNOSIS — G47.33 OSA (OBSTRUCTIVE SLEEP APNEA): Primary | ICD-10-CM

## 2024-05-09 DIAGNOSIS — D50.9 IRON DEFICIENCY ANEMIA, UNSPECIFIED IRON DEFICIENCY ANEMIA TYPE: ICD-10-CM

## 2024-05-09 DIAGNOSIS — E61.1 IRON DEFICIENCY: ICD-10-CM

## 2024-05-09 RX ORDER — FERROUS SULFATE TAB 325 MG (65 MG ELEMENTAL FE) 325 (65 FE) MG
1 TAB ORAL DAILY
Qty: 90 TABLET | Refills: 0 | Status: SHIPPED | OUTPATIENT
Start: 2024-05-09

## 2024-05-13 ENCOUNTER — TELEPHONE (OUTPATIENT)
Dept: PRIMARY CARE | Facility: CLINIC | Age: 69
End: 2024-05-13
Payer: MEDICARE

## 2024-05-13 NOTE — TELEPHONE ENCOUNTER
Pt stated she has ran out of her simvastatin and she stated she had called about a week and half ago (nothing was documented for that time period). I called pt back and told her she left  for [facility] office and to reach out to her correct PCP office.

## 2024-05-16 DIAGNOSIS — M79.7 FIBROMYALGIA: ICD-10-CM

## 2024-05-16 RX ORDER — METHOCARBAMOL 500 MG/1
500 TABLET, FILM COATED ORAL NIGHTLY
Qty: 30 TABLET | Refills: 0 | Status: SHIPPED | OUTPATIENT
Start: 2024-05-16

## 2024-05-16 RX ORDER — DULOXETIN HYDROCHLORIDE 60 MG/1
60 CAPSULE, DELAYED RELEASE ORAL DAILY
Qty: 90 CAPSULE | Refills: 0 | Status: SHIPPED | OUTPATIENT
Start: 2024-05-16

## 2024-05-22 ENCOUNTER — OFFICE VISIT (OUTPATIENT)
Dept: RHEUMATOLOGY | Facility: CLINIC | Age: 69
End: 2024-05-22
Payer: MEDICARE

## 2024-05-22 VITALS — BODY MASS INDEX: 37.49 KG/M2 | WEIGHT: 205 LBS | DIASTOLIC BLOOD PRESSURE: 62 MMHG | SYSTOLIC BLOOD PRESSURE: 144 MMHG

## 2024-05-22 DIAGNOSIS — M65.331 TRIGGER MIDDLE FINGER OF RIGHT HAND: ICD-10-CM

## 2024-05-22 DIAGNOSIS — M79.7 FIBROMYALGIA: Primary | ICD-10-CM

## 2024-05-22 PROCEDURE — 1159F MED LIST DOCD IN RCRD: CPT | Performed by: INTERNAL MEDICINE

## 2024-05-22 PROCEDURE — 1123F ACP DISCUSS/DSCN MKR DOCD: CPT | Performed by: INTERNAL MEDICINE

## 2024-05-22 PROCEDURE — 99213 OFFICE O/P EST LOW 20 MIN: CPT | Performed by: INTERNAL MEDICINE

## 2024-05-22 PROCEDURE — 3078F DIAST BP <80 MM HG: CPT | Performed by: INTERNAL MEDICINE

## 2024-05-22 PROCEDURE — 3077F SYST BP >= 140 MM HG: CPT | Performed by: INTERNAL MEDICINE

## 2024-05-22 PROCEDURE — 20550 NJX 1 TENDON SHEATH/LIGAMENT: CPT | Performed by: INTERNAL MEDICINE

## 2024-05-22 PROCEDURE — 1036F TOBACCO NON-USER: CPT | Performed by: INTERNAL MEDICINE

## 2024-05-22 PROCEDURE — 1157F ADVNC CARE PLAN IN RCRD: CPT | Performed by: INTERNAL MEDICINE

## 2024-05-22 PROCEDURE — 1160F RVW MEDS BY RX/DR IN RCRD: CPT | Performed by: INTERNAL MEDICINE

## 2024-05-22 RX ORDER — LIDOCAINE HYDROCHLORIDE 10 MG/ML
0.5 INJECTION INFILTRATION; PERINEURAL ONCE
Status: COMPLETED | OUTPATIENT
Start: 2024-05-22 | End: 2024-05-22

## 2024-05-22 RX ORDER — TRIAMCINOLONE ACETONIDE 40 MG/ML
10 INJECTION, SUSPENSION INTRA-ARTICULAR; INTRAMUSCULAR
Status: COMPLETED | OUTPATIENT
Start: 2024-05-22 | End: 2024-05-22

## 2024-05-22 RX ORDER — LIDOCAINE HYDROCHLORIDE 10 MG/ML
0.5 INJECTION INFILTRATION; PERINEURAL
Status: COMPLETED | OUTPATIENT
Start: 2024-05-22 | End: 2024-05-22

## 2024-05-22 RX ORDER — TRIAMCINOLONE ACETONIDE 40 MG/ML
10 INJECTION, SUSPENSION INTRA-ARTICULAR; INTRAMUSCULAR ONCE
Status: COMPLETED | OUTPATIENT
Start: 2024-05-22 | End: 2024-05-22

## 2024-05-22 RX ADMIN — LIDOCAINE HYDROCHLORIDE 0.5 ML: 10 INJECTION INFILTRATION; PERINEURAL at 12:49

## 2024-05-22 RX ADMIN — TRIAMCINOLONE ACETONIDE 10 MG: 40 INJECTION, SUSPENSION INTRA-ARTICULAR; INTRAMUSCULAR at 12:54

## 2024-05-22 RX ADMIN — LIDOCAINE HYDROCHLORIDE 5 MG: 10 INJECTION INFILTRATION; PERINEURAL at 12:53

## 2024-05-22 RX ADMIN — TRIAMCINOLONE ACETONIDE 10 MG: 40 INJECTION, SUSPENSION INTRA-ARTICULAR; INTRAMUSCULAR at 12:49

## 2024-05-22 NOTE — PATIENT INSTRUCTIONS
Continue duloxetine and methocarbamol as prescribed.  Call me if any question.  Follow-up in 6 months or sooner if needed.

## 2024-05-22 NOTE — PROGRESS NOTES
Subjective  . Annie Joseph is a 68 y.o. female who presents for FUV.    HPI. 68-year-old female with history of fibromyalgia, HTN, GERD, IBS and vitamin D deficiency presented for follow-up.     Reports locking of the right middle finger.  Feels diffuse pain along the right elbow and left hand.  She has not noticed swelling of the joints.    Widespread myofascial pain of fibromyalgia tolerable with duloxetine and methocarbamol.    Review of Systems   All other systems reviewed and are negative.    Objective     Blood pressure 144/62, weight 93 kg (205 lb).    Physical Exam.  Gen. AAO x3, NAD.  HEENT: No pallor or icterus, PERRLA, EOMI. No cervical lymphadenopathy .  Skin: No rashes.  Heart: S1, S2/ RRR.   Lungs: CTA B.  Abdomen: Soft, NT/ND.  MSK: No: Or tender joint.  Right middle finger flexor tendon tenderness at A1 pulley.  Neuro: Sensation to touch intact.Strength 5/5 throughout.   Psych:Appropriate mood and behavior  EXT: No edema    Assessment/Plan . 68-year-old female with history of fibromyalgia, HTN, GERD, IBS and vitamin D deficiency presented for follow-up.     #1: Fibromyalgia.  Doing well.  -Continue duloxetine and methocarbamol.  #2: Right middle finger triggering.  -Kenalog 10 mg mixed with 0.5 mL of 1% lidocaine injected at A1 pulley.      Follow-up in 6 months or sooner if needed.     This note was partially generated using the Dragon Voice recognition system. There may be some incorrect wording, spelling and/or spelling errors or punctuation errors that were not corrected prior to committing the note to the medical record.      Problem List Items Addressed This Visit    None      Active Ambulatory Problems     Diagnosis Date Noted    Abnormal glucose 02/13/2023    Arthritis of knee, left 02/13/2023    Cystocele, midline 02/13/2023    Dysphagia 02/13/2023    Hyperglycemia 02/13/2023    Fatigue 02/13/2023    Fibromyalgia 02/13/2023    Hiatal hernia with GERD 02/13/2023    Hypertension 02/13/2023     IBS (irritable bowel syndrome) 02/13/2023    Left knee pain 02/13/2023    Low back pain 02/13/2023    Mixed incontinence urge and stress 02/13/2023    Nevus 02/13/2023    OAB (overactive bladder) 02/13/2023    ROBERT (obstructive sleep apnea) 02/13/2023    Postmenopausal bleeding 02/13/2023    Urethral prolapse 02/13/2023    Uterine prolapse 02/13/2023    Vitamin D deficiency 02/13/2023    Urinary incontinence 02/13/2023    Fecal incontinence 02/13/2023    Chronic constipation 02/13/2023    Coronary artery disease involving native coronary artery of native heart without angina pectoris 02/13/2023    Hearing loss 02/13/2023    Renal mass, left 02/13/2023    Non-ST elevation myocardial infarction (NSTEMI) in recovery phase (Multi) 02/13/2023    Excessive daytime sleepiness 07/20/2023    Knee pain 07/20/2023    Suspected sleep apnea 07/20/2023    Elevated blood sugar 07/20/2023    Central sleep apnea 07/20/2023    Obesity, morbid (Multi) 11/29/2023    Stage 3a chronic kidney disease (Multi) 11/29/2023    Iron deficiency anemia 11/29/2023     Resolved Ambulatory Problems     Diagnosis Date Noted    Chest pain 02/13/2023    Chronic sinusitis 02/13/2023    Condition not found 02/13/2023     Past Medical History:   Diagnosis Date    Encounter for full-term uncomplicated delivery ([facility])     Obstructive sleep apnea (adult) (pediatric)     Other chronic pain     Other conditions influencing health status     Other symptoms and signs involving the nervous system 04/20/2021    Personal history of diseases of the blood and blood-forming organs and certain disorders involving the immune mechanism     Personal history of other diseases of the circulatory system 01/05/2020    Personal history of other diseases of the musculoskeletal system and connective tissue 10/09/2019    Personal history of other diseases of the musculoskeletal system and connective tissue 01/05/2020    Personal history of other diseases of the  musculoskeletal system and connective tissue     Personal history of other diseases of the nervous system and sense organs     Personal history of other endocrine, nutritional and metabolic disease     Personal history of other infectious and parasitic diseases     Rectocele     Spontaneous ecchymoses        Family History   Problem Relation Name Age of Onset    Diabetes Mother      Fibromyalgia Mother      Other (MI) Mother      Other (cerebrovascular accident) Father      Heart attack Father      Other (anxiety and depression) Sister      Other (anxiety and depression) Brother      Diabetes Brother      Other (MI) Brother      Breast cancer Maternal Grandmother  50    Other (cardiac disorder) Other      Other (htn) Other      Diabetes Other aunt     Other (MI) Other GM     Other (MI [Other]) Other GF        Past Surgical History:   Procedure Laterality Date    DILATION AND CURETTAGE OF UTERUS  09/04/2015    Dilation And Curettage    OTHER SURGICAL HISTORY  09/04/2015    Cholecystotomy    OTHER SURGICAL HISTORY  09/04/2015    Breast Surgery Puncture Aspiration Of Cyst       Social History     Tobacco Use   Smoking Status Never   Smokeless Tobacco Never       Allergies  Atorvastatin, Clindamycin, Levaquin [levofloxacin], Polyglyceryl-6 polyricinoleate, and Sulfa (sulfonamide antibiotics)    Current Meds  Current Outpatient Medications   Medication Instructions    amLODIPine (Norvasc) 5 mg tablet 2 tablets, oral, Daily    aspirin 81 mg EC tablet 1 tablet, oral, Daily    calcium carbonate/vitamin D3 (CALCIUM 600 + D,3, ORAL) 600 mg, oral, 2 times daily    cholecalciferol (Vitamin D-3) 25 MCG (1000 UT) tablet oral    clopidogrel (Plavix) 75 mg tablet 1 tablet, oral, Daily    DULoxetine (CYMBALTA) 60 mg, oral, Daily    FeroSuL tablet 1 tablet, oral, Daily, WITH A MEAL    fluticasone (Flonase) 50 mcg/actuation nasal spray 2 sprays, Each Nostril, Daily, Shake gently. Before first use, prime pump. After use, clean tip and  replace cap.    isosorbide mononitrate ER (Imdur) 30 mg 24 hr tablet 1 tablet, oral, 2 times daily, Do not crush or chew.     magnesium glycinate 100 mg magnesium capsule oral    methocarbamol (ROBAXIN) 500 mg, oral, Nightly    nitroglycerin (Nitrostat) 0.4 mg SL tablet 1 tablet, sublingual, As needed    pantoprazole (PROTONIX) 40 mg, oral, Daily    valsartan (DIOVAN) 160 mg, oral, Daily, Pt doesn't know dose.        Lab Results   Component Value Date    RF 10 02/04/2020    SEDRATE 5 02/04/2020    CRP 0.20 02/04/2020    URICACID 5.3 02/04/2020    CKTOTAL 160 01/19/2024        Injection tendon or ligament: R long A1 for trigger finger on 5/22/2024 12:49 PM  Indications: pain  Details: 25 G needle, medial approach  Medications: 10 mg triamcinolone acetonide 40 mg/mL; 0.5 mL lidocaine 10 mg/mL (1 %)  Outcome: tolerated well, no immediate complications  Procedure, treatment alternatives, risks and benefits explained, specific risks discussed. Consent was given by the patient. Immediately prior to procedure a time out was called to verify the correct patient, procedure, equipment, support staff and site/side marked as required. Patient was prepped and draped in the usual sterile fashion.              Pankaj Jade MD

## 2024-05-29 ENCOUNTER — APPOINTMENT (OUTPATIENT)
Dept: PRIMARY CARE | Facility: CLINIC | Age: 69
End: 2024-05-29
Payer: MEDICARE

## 2024-06-13 ENCOUNTER — HOSPITAL ENCOUNTER (OUTPATIENT)
Dept: RADIOLOGY | Facility: CLINIC | Age: 69
Discharge: HOME | End: 2024-06-13
Payer: MEDICARE

## 2024-06-13 ENCOUNTER — LAB (OUTPATIENT)
Dept: LAB | Facility: LAB | Age: 69
End: 2024-06-13
Payer: MEDICARE

## 2024-06-13 ENCOUNTER — APPOINTMENT (OUTPATIENT)
Dept: PRIMARY CARE | Facility: CLINIC | Age: 69
End: 2024-06-13
Payer: MEDICARE

## 2024-06-13 VITALS
RESPIRATION RATE: 17 BRPM | HEART RATE: 64 BPM | HEIGHT: 62 IN | SYSTOLIC BLOOD PRESSURE: 130 MMHG | BODY MASS INDEX: 38.13 KG/M2 | OXYGEN SATURATION: 96 % | DIASTOLIC BLOOD PRESSURE: 64 MMHG | WEIGHT: 207.2 LBS

## 2024-06-13 DIAGNOSIS — N18.31 STAGE 3A CHRONIC KIDNEY DISEASE (MULTI): ICD-10-CM

## 2024-06-13 DIAGNOSIS — R73.03 PREDIABETES: ICD-10-CM

## 2024-06-13 DIAGNOSIS — D64.9 ANEMIA, UNSPECIFIED TYPE: ICD-10-CM

## 2024-06-13 DIAGNOSIS — H91.90 HEARING LOSS, UNSPECIFIED HEARING LOSS TYPE, UNSPECIFIED LATERALITY: ICD-10-CM

## 2024-06-13 DIAGNOSIS — I25.10 CORONARY ARTERY DISEASE INVOLVING NATIVE CORONARY ARTERY OF NATIVE HEART WITHOUT ANGINA PECTORIS: Primary | ICD-10-CM

## 2024-06-13 DIAGNOSIS — I25.10 CORONARY ARTERY DISEASE INVOLVING NATIVE CORONARY ARTERY OF NATIVE HEART WITHOUT ANGINA PECTORIS: ICD-10-CM

## 2024-06-13 DIAGNOSIS — M79.7 FIBROMYALGIA: ICD-10-CM

## 2024-06-13 DIAGNOSIS — C64.2 MALIGNANT NEOPLASM OF LEFT KIDNEY, EXCEPT RENAL PELVIS (MULTI): ICD-10-CM

## 2024-06-13 DIAGNOSIS — G89.29 CHRONIC BILATERAL LOW BACK PAIN WITHOUT SCIATICA: ICD-10-CM

## 2024-06-13 DIAGNOSIS — N39.41 URGE INCONTINENCE: ICD-10-CM

## 2024-06-13 DIAGNOSIS — I10 HYPERTENSION, UNSPECIFIED TYPE: ICD-10-CM

## 2024-06-13 DIAGNOSIS — M54.50 CHRONIC BILATERAL LOW BACK PAIN WITHOUT SCIATICA: ICD-10-CM

## 2024-06-13 DIAGNOSIS — E61.1 IRON DEFICIENCY: ICD-10-CM

## 2024-06-13 DIAGNOSIS — D50.0 IRON DEFICIENCY ANEMIA DUE TO CHRONIC BLOOD LOSS: ICD-10-CM

## 2024-06-13 LAB
ALBUMIN SERPL BCP-MCNC: 4.2 G/DL (ref 3.4–5)
ALP SERPL-CCNC: 55 U/L (ref 33–136)
ALT SERPL W P-5'-P-CCNC: 13 U/L (ref 7–45)
ANION GAP SERPL CALC-SCNC: 12 MMOL/L (ref 10–20)
AST SERPL W P-5'-P-CCNC: 18 U/L (ref 9–39)
BASOPHILS # BLD AUTO: 0.06 X10*3/UL (ref 0–0.1)
BASOPHILS NFR BLD AUTO: 0.6 %
BILIRUB SERPL-MCNC: 0.4 MG/DL (ref 0–1.2)
BUN SERPL-MCNC: 18 MG/DL (ref 6–23)
CALCIUM SERPL-MCNC: 9.3 MG/DL (ref 8.6–10.3)
CHLORIDE SERPL-SCNC: 104 MMOL/L (ref 98–107)
CO2 SERPL-SCNC: 27 MMOL/L (ref 21–32)
CREAT SERPL-MCNC: 1.26 MG/DL (ref 0.5–1.05)
EGFRCR SERPLBLD CKD-EPI 2021: 46 ML/MIN/1.73M*2
EOSINOPHIL # BLD AUTO: 0.3 X10*3/UL (ref 0–0.7)
EOSINOPHIL NFR BLD AUTO: 3.2 %
ERYTHROCYTE [DISTWIDTH] IN BLOOD BY AUTOMATED COUNT: 17.2 % (ref 11.5–14.5)
GLUCOSE SERPL-MCNC: 69 MG/DL (ref 74–99)
HCT VFR BLD AUTO: 41.4 % (ref 36–46)
HGB BLD-MCNC: 12.5 G/DL (ref 12–16)
IMM GRANULOCYTES # BLD AUTO: 0.04 X10*3/UL (ref 0–0.7)
IMM GRANULOCYTES NFR BLD AUTO: 0.4 % (ref 0–0.9)
IRON SATN MFR SERPL: 12 % (ref 25–45)
IRON SERPL-MCNC: 48 UG/DL (ref 35–150)
LYMPHOCYTES # BLD AUTO: 2.85 X10*3/UL (ref 1.2–4.8)
LYMPHOCYTES NFR BLD AUTO: 30.8 %
MCH RBC QN AUTO: 25.8 PG (ref 26–34)
MCHC RBC AUTO-ENTMCNC: 30.2 G/DL (ref 32–36)
MCV RBC AUTO: 86 FL (ref 80–100)
MONOCYTES # BLD AUTO: 0.86 X10*3/UL (ref 0.1–1)
MONOCYTES NFR BLD AUTO: 9.3 %
NEUTROPHILS # BLD AUTO: 5.15 X10*3/UL (ref 1.2–7.7)
NEUTROPHILS NFR BLD AUTO: 55.7 %
NRBC BLD-RTO: 0 /100 WBCS (ref 0–0)
PLATELET # BLD AUTO: 322 X10*3/UL (ref 150–450)
POTASSIUM SERPL-SCNC: 4.5 MMOL/L (ref 3.5–5.3)
PROT SERPL-MCNC: 6.7 G/DL (ref 6.4–8.2)
RBC # BLD AUTO: 4.84 X10*6/UL (ref 4–5.2)
SODIUM SERPL-SCNC: 138 MMOL/L (ref 136–145)
TIBC SERPL-MCNC: 394 UG/DL (ref 240–445)
UIBC SERPL-MCNC: 346 UG/DL (ref 110–370)
WBC # BLD AUTO: 9.3 X10*3/UL (ref 4.4–11.3)

## 2024-06-13 PROCEDURE — 85025 COMPLETE CBC W/AUTO DIFF WBC: CPT

## 2024-06-13 PROCEDURE — 82607 VITAMIN B-12: CPT

## 2024-06-13 PROCEDURE — 82746 ASSAY OF FOLIC ACID SERUM: CPT

## 2024-06-13 PROCEDURE — 83540 ASSAY OF IRON: CPT

## 2024-06-13 PROCEDURE — 83036 HEMOGLOBIN GLYCOSYLATED A1C: CPT

## 2024-06-13 PROCEDURE — 71046 X-RAY EXAM CHEST 2 VIEWS: CPT

## 2024-06-13 PROCEDURE — 80053 COMPREHEN METABOLIC PANEL: CPT

## 2024-06-13 PROCEDURE — 36415 COLL VENOUS BLD VENIPUNCTURE: CPT

## 2024-06-13 PROCEDURE — 71046 X-RAY EXAM CHEST 2 VIEWS: CPT | Performed by: RADIOLOGY

## 2024-06-13 PROCEDURE — 83550 IRON BINDING TEST: CPT

## 2024-06-13 RX ORDER — FAMOTIDINE 40 MG/1
40 TABLET, FILM COATED ORAL NIGHTLY
COMMUNITY
Start: 2024-05-29

## 2024-06-13 ASSESSMENT — PATIENT HEALTH QUESTIONNAIRE - PHQ9
1. LITTLE INTEREST OR PLEASURE IN DOING THINGS: NOT AT ALL
2. FEELING DOWN, DEPRESSED OR HOPELESS: NOT AT ALL
SUM OF ALL RESPONSES TO PHQ9 QUESTIONS 1 AND 2: 0

## 2024-06-13 NOTE — PROGRESS NOTES
Subjective   Patient ID: Annie Joseph is a 69 y.o. female who presents for Follow-up (Pt is here for a 4 month follow up.).    HPI   Flexible sigmoidoscopy was completed due to the patient's anemia at and chronic rectal bleeding.  There was an ulceration at the site of her previous polypectomy.  3 clips were placed at that time.  2/20/2024.    Pt had a ED visit on 2/1/24 for bleeding post procedure.   EGD is still pending   Taking Protonix and Pepcid  Vomiting has lessened (slowing down her eating, more intentional)  Intermittent constipation causes some mild rectal bleeding    Pt continues to struggles with fatigue.     Obesity   Worsening   Will try to start aquatic therapy due to her arthritis   [facility] has work out machines  Eating     Pt has had a couple of episodes of angina but took her nitroglycerin    Cxr chest completed today   Pending CT abdomin and pelvis     Objective   Physical Exam  Vitals reviewed.   Constitutional:       Appearance: Normal appearance.   Cardiovascular:      Rate and Rhythm: Normal rate and regular rhythm.      Heart sounds: No murmur heard.  Pulmonary:      Effort: Pulmonary effort is normal. No respiratory distress.      Breath sounds: Normal breath sounds. No wheezing.   Musculoskeletal:      Cervical back: Neck supple.      Left lower leg: No edema.   Neurological:      Mental Status: She is alert.       Assessment/Plan   Diagnoses and all orders for this visit:  Coronary artery disease involving native coronary artery of native heart without angina pectoris  -     Comprehensive metabolic panel; Future  Stage 3a chronic kidney disease (Multi)  Anemia, unspecified type  -     CBC and Auto Differential; Future  Iron deficiency anemia due to chronic blood loss  Hypertension, unspecified type  Hearing loss, unspecified hearing loss type, unspecified laterality  -     Referral to Audiology; Future  Fibromyalgia  -     Referral to Physical Therapy; Future  Chronic bilateral  low back pain without sciatica  -     Referral to Physical Therapy; Future  Urge incontinence  Prediabetes  -     Hemoglobin A1C; Future    Annie Joseph is a 69 year old female who presents to the office for follow up.    Anemia   Concerns for persist bleeding post   Could be causing some of her fatigue  has been gradually worsening.  Patient most recent hemoglobin approximately 10  This is likely contributing to her chronic fatigue  Patient denies any GI bleeding.There was an ulceration at the site of her previous polypectomy.  3 clips were placed at that time.  2/20/2024     HTN  Improved  130/64mmHg  Continued amlodipine 10 mg   Continued Valsartan 160 mg daily   Follow-up with cardiology  Physical exam was stable. Discussed maintaining diets such as DASH to help maintain normal Blood pressure. Encouraged regular exercise for a total of 120 min weekly. We will fu labs in 1-3 days. For now there will be no change in medical management. All questions and concerns were addressed. Pt will follow up in 4-6 months or sooner if needed.     Obesity   Pt would like to increase activity levels   Due to chronic arthritis aquatic therapy ordered   Discussed portion sizes and activity recommendations     Hearing Loss   Referral to audiology     Renal Mass   S/p Left nephrectomy due to mass 4/23  Pending CT abdomin and pelvis fu   Cr 1.3, GFR 45  Repeat today     CKD  Stage 3a  Post left nephrectomy and long standing HTN       Health Maintenance   Topic Date Due    Derm Melanoma Skin Check  Never done    Pneumococcal Vaccine: 65+ Years (1 of 2 - PCV) 05/28/1961    Hepatitis C Screening  Never done    CKD: Urine Protein Screening  Never done    DTaP/Tdap/Td Vaccines (1 - Tdap) Never done    Zoster Vaccines (1 of 2) Never done    RSV Pregnant patients and/or  patients aged 60+ years (1 - 1-dose 60+ series) Never done    COVID-19 Vaccine (1 - 2023-24 season) Never done    Medicare Annual Wellness Visit (AWV)  02/07/2024     Influenza Vaccine (Season Ended) 09/01/2024    Mammogram  12/04/2024    Diabetes: Hemoglobin A1C  06/13/2025    Diabetes Screening  06/13/2025    Lipid Panel  10/26/2028    Colorectal Cancer Screening  01/19/2034    Bone Density Scan  Completed    HIB Vaccines  Aged Out    Hepatitis B Vaccines  Aged Out    IPV Vaccines  Aged Out    Hepatitis A Vaccines  Aged Out    Meningococcal Vaccine  Aged Out    Rotavirus Vaccines  Aged Out    HPV Vaccines  Aged Out    Irritable Bowel Syndrome  Discontinued                   Slime Mcdermott DO 06/13/24 10:45 AM

## 2024-06-14 LAB
EST. AVERAGE GLUCOSE BLD GHB EST-MCNC: 123 MG/DL
FOLATE SERPL-MCNC: 4.6 NG/ML
HBA1C MFR BLD: 5.9 %
VIT B12 SERPL-MCNC: 332 PG/ML (ref 211–911)

## 2024-07-23 ENCOUNTER — APPOINTMENT (OUTPATIENT)
Dept: CARDIOLOGY | Facility: HOSPITAL | Age: 69
End: 2024-07-23
Payer: MEDICARE

## 2024-07-23 ENCOUNTER — TELEPHONE (OUTPATIENT)
Dept: PRIMARY CARE | Facility: CLINIC | Age: 69
End: 2024-07-23
Payer: MEDICARE

## 2024-07-23 ENCOUNTER — HOSPITAL ENCOUNTER (EMERGENCY)
Facility: HOSPITAL | Age: 69
Discharge: HOME | End: 2024-07-23
Payer: MEDICARE

## 2024-07-23 ENCOUNTER — APPOINTMENT (OUTPATIENT)
Dept: RADIOLOGY | Facility: HOSPITAL | Age: 69
End: 2024-07-23
Payer: MEDICARE

## 2024-07-23 VITALS
SYSTOLIC BLOOD PRESSURE: 150 MMHG | HEART RATE: 71 BPM | BODY MASS INDEX: 38.14 KG/M2 | OXYGEN SATURATION: 100 % | RESPIRATION RATE: 18 BRPM | HEIGHT: 62 IN | DIASTOLIC BLOOD PRESSURE: 79 MMHG | WEIGHT: 207.23 LBS | TEMPERATURE: 98.2 F

## 2024-07-23 DIAGNOSIS — R60.0 LOWER EXTREMITY EDEMA: Primary | ICD-10-CM

## 2024-07-23 LAB
ALBUMIN SERPL-MCNC: 4.2 G/DL (ref 3.5–5)
ALP BLD-CCNC: 61 U/L (ref 35–125)
ALT SERPL-CCNC: 19 U/L (ref 5–40)
ANION GAP SERPL CALC-SCNC: 9 MMOL/L
AST SERPL-CCNC: 18 U/L (ref 5–40)
BASOPHILS # BLD AUTO: 0.06 X10*3/UL (ref 0–0.1)
BASOPHILS NFR BLD AUTO: 0.8 %
BILIRUB SERPL-MCNC: 0.3 MG/DL (ref 0.1–1.2)
BUN SERPL-MCNC: 20 MG/DL (ref 8–25)
CALCIUM SERPL-MCNC: 9.3 MG/DL (ref 8.5–10.4)
CHLORIDE SERPL-SCNC: 102 MMOL/L (ref 97–107)
CO2 SERPL-SCNC: 26 MMOL/L (ref 24–31)
CREAT SERPL-MCNC: 1.5 MG/DL (ref 0.4–1.6)
EGFRCR SERPLBLD CKD-EPI 2021: 37 ML/MIN/1.73M*2
EOSINOPHIL # BLD AUTO: 0.39 X10*3/UL (ref 0–0.7)
EOSINOPHIL NFR BLD AUTO: 5.2 %
ERYTHROCYTE [DISTWIDTH] IN BLOOD BY AUTOMATED COUNT: 15.7 % (ref 11.5–14.5)
GLUCOSE SERPL-MCNC: 107 MG/DL (ref 65–99)
HCT VFR BLD AUTO: 39.6 % (ref 36–46)
HGB BLD-MCNC: 12.7 G/DL (ref 12–16)
IMM GRANULOCYTES # BLD AUTO: 0.02 X10*3/UL (ref 0–0.7)
IMM GRANULOCYTES NFR BLD AUTO: 0.3 % (ref 0–0.9)
LYMPHOCYTES # BLD AUTO: 2.57 X10*3/UL (ref 1.2–4.8)
LYMPHOCYTES NFR BLD AUTO: 34.1 %
MCH RBC QN AUTO: 27 PG (ref 26–34)
MCHC RBC AUTO-ENTMCNC: 32.1 G/DL (ref 32–36)
MCV RBC AUTO: 84 FL (ref 80–100)
MONOCYTES # BLD AUTO: 0.83 X10*3/UL (ref 0.1–1)
MONOCYTES NFR BLD AUTO: 11 %
NEUTROPHILS # BLD AUTO: 3.66 X10*3/UL (ref 1.2–7.7)
NEUTROPHILS NFR BLD AUTO: 48.6 %
NRBC BLD-RTO: 0 /100 WBCS (ref 0–0)
NT-PROBNP SERPL-MCNC: 143 PG/ML (ref 0–353)
PLATELET # BLD AUTO: 270 X10*3/UL (ref 150–450)
POTASSIUM SERPL-SCNC: 4.5 MMOL/L (ref 3.4–5.1)
PROT SERPL-MCNC: 6.9 G/DL (ref 5.9–7.9)
RBC # BLD AUTO: 4.7 X10*6/UL (ref 4–5.2)
SODIUM SERPL-SCNC: 137 MMOL/L (ref 133–145)
TROPONIN T SERPL-MCNC: 13 NG/L
TROPONIN T SERPL-MCNC: 13 NG/L
WBC # BLD AUTO: 7.5 X10*3/UL (ref 4.4–11.3)

## 2024-07-23 PROCEDURE — 83880 ASSAY OF NATRIURETIC PEPTIDE: CPT | Performed by: NURSE PRACTITIONER

## 2024-07-23 PROCEDURE — 84484 ASSAY OF TROPONIN QUANT: CPT | Performed by: NURSE PRACTITIONER

## 2024-07-23 PROCEDURE — 80053 COMPREHEN METABOLIC PANEL: CPT | Performed by: NURSE PRACTITIONER

## 2024-07-23 PROCEDURE — 93970 EXTREMITY STUDY: CPT | Performed by: RADIOLOGY

## 2024-07-23 PROCEDURE — 36415 COLL VENOUS BLD VENIPUNCTURE: CPT | Performed by: NURSE PRACTITIONER

## 2024-07-23 PROCEDURE — 99285 EMERGENCY DEPT VISIT HI MDM: CPT | Mod: 25

## 2024-07-23 PROCEDURE — 85025 COMPLETE CBC W/AUTO DIFF WBC: CPT | Performed by: NURSE PRACTITIONER

## 2024-07-23 PROCEDURE — 93005 ELECTROCARDIOGRAM TRACING: CPT

## 2024-07-23 PROCEDURE — 93970 EXTREMITY STUDY: CPT

## 2024-07-23 PROCEDURE — 71045 X-RAY EXAM CHEST 1 VIEW: CPT

## 2024-07-23 PROCEDURE — 71045 X-RAY EXAM CHEST 1 VIEW: CPT | Performed by: RADIOLOGY

## 2024-07-23 ASSESSMENT — LIFESTYLE VARIABLES
TOTAL SCORE: 0
HAVE YOU EVER FELT YOU SHOULD CUT DOWN ON YOUR DRINKING: NO
HAVE PEOPLE ANNOYED YOU BY CRITICIZING YOUR DRINKING: NO
EVER FELT BAD OR GUILTY ABOUT YOUR DRINKING: NO
EVER HAD A DRINK FIRST THING IN THE MORNING TO STEADY YOUR NERVES TO GET RID OF A HANGOVER: NO

## 2024-07-23 ASSESSMENT — PAIN - FUNCTIONAL ASSESSMENT: PAIN_FUNCTIONAL_ASSESSMENT: 0-10

## 2024-07-23 NOTE — ED PROVIDER NOTES
HPI   Chief Complaint   Patient presents with    Leg Swelling     New onset    Shortness of Breath       HPI  See my MDM      Patient History   Past Medical History:   Diagnosis Date    Encounter for full-term uncomplicated delivery ([facility])      (spontaneous vaginal delivery)    Fibromyalgia 2022    Fibromyalgia    Obstructive sleep apnea (adult) (pediatric)     Obstructive sleep apnea    Other chronic pain     Chronic pain    Other conditions influencing health status     Cystocele    Other symptoms and signs involving the nervous system 2021    Suspected sleep apnea    Personal history of diseases of the blood and blood-forming organs and certain disorders involving the immune mechanism     History of anemia    Personal history of other diseases of the circulatory system 2020    History of hypertension    Personal history of other diseases of the musculoskeletal system and connective tissue 10/09/2019    History of fibromyositis    Personal history of other diseases of the musculoskeletal system and connective tissue 2020    History of arthritis    Personal history of other diseases of the musculoskeletal system and connective tissue     History of arthritis    Personal history of other diseases of the nervous system and sense organs     History of sleep disturbance    Personal history of other endocrine, nutritional and metabolic disease     History of hypothyroidism    Personal history of other infectious and parasitic diseases     History of varicella    Rectocele     Rectocele    Spontaneous ecchymoses     Bruises easily     Past Surgical History:   Procedure Laterality Date    DILATION AND CURETTAGE OF UTERUS  2015    Dilation And Curettage    OTHER SURGICAL HISTORY  2015    Cholecystotomy    OTHER SURGICAL HISTORY  2015    Breast Surgery Puncture Aspiration Of Cyst     Family History   Problem Relation Name Age of Onset    Diabetes Mother      Fibromyalgia  Mother      Other (MI) Mother      Dementia Mother      Other (cerebrovascular accident) Father      Heart attack Father      Other (anxiety and depression) Sister      Other (anxiety and depression) Brother      Diabetes Brother      Other (MI) Brother      Breast cancer Maternal Grandmother  50    Other (cardiac disorder) Other      Other (htn) Other      Diabetes Other aunt     Other (MI) Other GM     Other (MI [Other]) Other GF      Social History     Tobacco Use    Smoking status: Never    Smokeless tobacco: Never   Vaping Use    Vaping status: Never Used   Substance Use Topics    Alcohol use: Not Currently    Drug use: Not Currently       Physical Exam   ED Triage Vitals [07/23/24 1551]   Temperature Heart Rate Respirations BP   36.8 °C (98.2 °F) 83 20 148/90      Pulse Ox Temp Source Heart Rate Source Patient Position   100 % Oral Brachial Sitting      BP Location FiO2 (%)     Left arm --       Physical Exam  CONSTITUTIONAL: Vital signs reviewed as charted, well-developed and in no distress  Eyes: Extraocular muscles are intact. Pupils equal round and reactive to light. Conjunctiva are pink.    ENT: Mucous membranes are moist. Tongue in the midline. Pharynx was without erythema or exudates, uvula midline  LUNGS: Breath sounds equal and clear to auscultation. Good air exchange, no wheezes rales or retractions, pulse oximetry is charted.  HEART: Regular rate and rhythm without murmur thrill or rub, strong tones, auscultation is normal.  1-2+ bilateral lower extremity pitting edema.  ABDOMEN: Soft and nontender without guarding rebound rigidity or mass. Bowel sounds are present and normal in all quadrants. There is no palpable masses or aneurysms identified. No hepatosplenomegaly, normal abdominal exam.  Neuro: The patient is awake, alert and oriented ×3. Moving all 4 extremities and answering questions appropriately.   MUSCULOSKELETAL: The calves are nontender to palpation. Full gross active range of motion.  "  PSYCH: Awake alert oriented, normal mood and affect.  Skin:  Dry, normal color, warm to the touch, no rash present.        ED Course & MDM   ED Course as of 07/23/24 2119 Tue Jul 23, 2024 1649 EKG personally interpreted by me performed at 1558  Sinus rhythm with occasional PVC ventricular rate 77 normal axis and intervals no acute ischemic changes [EF]      ED Course User Index  [EF] Eloise YANG Sandhya, DO         Diagnoses as of 07/23/24 2119   Lower extremity edema                       [place] Coma Scale Score: 15                        Medical Decision Making  History obtained from: patient    Vital signs, nursing notes, current medications, past medical history, Surgical history, allergies, social history, family History were reviewed.         HPI:  Patient is a 69-year-old female presenting emergency room today complaining of lower extremity edema.  States been ongoing for about a week.  Admits to history of CAD.  Denies dizziness, chest pain, shortness of breath, abdominal pain extremity edema.  Nontoxic well-appearing.  Patient does have history of renal insufficiency      10 point ROS was reviewed and negative except Noted above in HPI.  DDX: as listed above          MDM Summary/considerations:  EMERGENCY DEPARTMENT COURSE and DIFFERENTIAL DIAGNOSIS/MDM:    The patient presented with a chief complaint of lower extremity edema. The differential diagnosis associated with this patient's presentation includes CHF, renal failure, embolism.     Vitals:    Vitals:    07/23/24 1551 07/23/24 1945   BP: 148/90 150/79   BP Location: Left arm Left arm   Patient Position: Sitting Sitting   Pulse: 83 71   Resp: 20 18   Temp: 36.8 °C (98.2 °F)    TempSrc: Oral    SpO2: 100%    Weight: 94 kg (207 lb 3.7 oz)    Height: 1.575 m (5' 2\")        ED Course as of 07/23/24 2119 Tue Jul 23, 2024 1649 EKG personally interpreted by me performed at 1558  Sinus rhythm with occasional PVC ventricular rate 77 normal axis and " intervals no acute ischemic changes [EF]      ED Course User Index  [EF] Eloise Arthur DO         Diagnoses as of 07/23/24 2119   Lower extremity edema       History Limited by:    None    Independent history obtained from:    None    External records reviewed:    None    Diagnostics interpreted by me:    EKG interpreted by my attending physician and Xray(s) of the chest    Discussions with other clinicians:    None    Chronic conditions impacting care:    None    Social determinants of health affecting care:    None    Diagnostic tests considered but not performed: none    ED Medications managed:    Medications - No data to display    Prescription drugs considered:    None    Screenings:          Labs Reviewed   COMPREHENSIVE METABOLIC PANEL - Abnormal       Result Value    Glucose 107 (*)     Sodium 137      Potassium 4.5      Chloride 102      Bicarbonate 26      Urea Nitrogen 20      Creatinine 1.50      eGFR 37 (*)     Calcium 9.3      Albumin 4.2      Alkaline Phosphatase 61      Total Protein 6.9      AST 18      Bilirubin, Total 0.3      ALT 19      Anion Gap 9     CBC WITH AUTO DIFFERENTIAL - Abnormal    WBC 7.5      nRBC 0.0      RBC 4.70      Hemoglobin 12.7      Hematocrit 39.6      MCV 84      MCH 27.0      MCHC 32.1      RDW 15.7 (*)     Platelets 270      Neutrophils % 48.6      Immature Granulocytes %, Automated 0.3      Lymphocytes % 34.1      Monocytes % 11.0      Eosinophils % 5.2      Basophils % 0.8      Neutrophils Absolute 3.66      Immature Granulocytes Absolute, Automated 0.02      Lymphocytes Absolute 2.57      Monocytes Absolute 0.83      Eosinophils Absolute 0.39      Basophils Absolute 0.06     N-TERMINAL PROBNP - Normal    PROBNP 143      Narrative:     Reference ranges are based on clinical submission data. These ranges represent the 95th percentile of normal cut-off points. As NT Pro- BNP values approach 1000 pg/ml, clinical symptoms are more likely associated with CHF.   SERIAL  TROPONIN, INITIAL ([facility]) - Normal    Troponin T, High Sensitivity 13     SERIAL TROPONIN,  2 HOUR ([facility]) - Normal    Troponin T, High Sensitivity 13     TROPONIN T SERIES, HIGH SENSITIVITY (0, 2 HR, 6 HR)    Narrative:     The following orders were created for panel order Troponin T Series, High Sensitivity (0, 2HR, 6HR).  Procedure                               Abnormality         Status                     ---------                               -----------         ------                     Serial Troponin, Initial...[[id number]]  Normal              Final result               Serial Troponin, 2 Hour ...[[id number]]  Normal              Final result               Serial Troponin, 6 Hour ...[[id number]]                                                   Please view results for these tests on the individual orders.   SERIAL TROPONIN, 6 HOUR ([facility])     Vascular US lower extremity venous duplex bilateral   Final Result   No evidence of DVT in the visualized bilateral lower extremities.        The right peroneal vein is not well visualized.        Signed by: Dayne Houser 7/23/2024 7:59 PM   Dictation workstation:   [id number]      XR chest 1 view   Final Result   1.  No evidence of acute cardiopulmonary process.                  MACRO:   None        Signed by: Don Cornell 7/23/2024 5:27 PM   Dictation workstation:   [id number]        Medications - No data to display  Discharge Medication List as of 7/23/2024  8:30 PM        I estimate there is LOW risk for COMPARTMENT SYNDROME, DEEP VENOUS THROMBOSIS, SEPTIC ARTHRITIS, TENDON OR NEUROVASCULAR INJURY, thus I consider the discharge disposition reasonable. We have discussed the diagnosis and risks, and we agree with discharging home to follow-up with their primary doctor or the referral orthopedist. We also discussed returning to the Emergency Department immediately if new or worsening symptoms occur. We have discussed the symptoms which aremost concerning (e.g.,  changing or worsening pain, numbness, weakness) that necessitates immediate return.    Patient's workup includes 2 normal troponins, normal proBNP, no evidence of fluid around the lungs, no evidence of embolism in either leg.  Her  did states she ate 2 very salty meals back-to-back just prior to this starting.  Have recommended low-salt diet, compression stockings and elevating her feet at nighttime.  She will follow PCP 1 to 2 days for reevaluation.  Was discharged home stable condition.  She will follow-up with her cardiologist as scheduled.    All of the patient's questions were answered to the best of my ability.  Patient states understanding that they have been screened for an emergency today and we have not found any etiology of symptoms that requires emergent treatment or admission to the hospital at this point. They understand that they have not had definitive care day and require follow-up for treatment of their condition. They also state understanding that they may have an emergent condition that may potentially have not of detected at this visit and they must return to the emergency department if they develop any worsening of symptoms or new complaints.        Discussed H&P with supervising physician, aware of results and agrees with plan/ disposition.              Critical Care: Not warranted at this time         This chart was completed using voice recognition transcription software. Please excuse any errors of transcription including grammatical, punctuation, syntax and spelling errors.  Please contact me with any questions regarding this chart.    Procedure  Procedures     KRISTA Valles-JERRELL  07/23/24 [id number]

## 2024-07-23 NOTE — TELEPHONE ENCOUNTER
Pt called inquiring if she should get an appt, go to urgent care or ER due to bilateral edema of ankles and SOB. Spoke to Dr Mcdermott who advised pt go to ER

## 2024-07-24 DIAGNOSIS — M79.7 FIBROMYALGIA: ICD-10-CM

## 2024-07-24 LAB
ATRIAL RATE: 77 BPM
P AXIS: 58 DEGREES
P OFFSET: 180 MS
P ONSET: 123 MS
PR INTERVAL: 170 MS
Q ONSET: 208 MS
QRS COUNT: 13 BEATS
QRS DURATION: 94 MS
QT INTERVAL: 398 MS
QTC CALCULATION(BAZETT): 450 MS
QTC FREDERICIA: 432 MS
R AXIS: 25 DEGREES
T AXIS: 88 DEGREES
T OFFSET: 407 MS
VENTRICULAR RATE: 77 BPM

## 2024-07-25 RX ORDER — METHOCARBAMOL 500 MG/1
500 TABLET, FILM COATED ORAL NIGHTLY
Qty: 30 TABLET | Refills: 1 | Status: SHIPPED | OUTPATIENT
Start: 2024-07-25

## 2024-08-07 ENCOUNTER — APPOINTMENT (OUTPATIENT)
Dept: PRIMARY CARE | Facility: CLINIC | Age: 69
End: 2024-08-07
Payer: MEDICARE

## 2024-08-07 ENCOUNTER — APPOINTMENT (OUTPATIENT)
Dept: AUDIOLOGY | Facility: CLINIC | Age: 69
End: 2024-08-07
Payer: MEDICARE

## 2024-08-07 VITALS
BODY MASS INDEX: 37.98 KG/M2 | HEART RATE: 42 BPM | WEIGHT: 206.4 LBS | OXYGEN SATURATION: 96 % | SYSTOLIC BLOOD PRESSURE: 132 MMHG | DIASTOLIC BLOOD PRESSURE: 60 MMHG | HEIGHT: 62 IN

## 2024-08-07 DIAGNOSIS — G89.29 CHRONIC LOW BACK PAIN, UNSPECIFIED BACK PAIN LATERALITY, UNSPECIFIED WHETHER SCIATICA PRESENT: ICD-10-CM

## 2024-08-07 DIAGNOSIS — N81.89 WEAKNESS OF PELVIC FLOOR: ICD-10-CM

## 2024-08-07 DIAGNOSIS — M25.562 CHRONIC PAIN OF BOTH KNEES: ICD-10-CM

## 2024-08-07 DIAGNOSIS — I25.118 ATHEROSCLEROTIC HEART DISEASE OF NATIVE CORONARY ARTERY WITH OTHER FORMS OF ANGINA PECTORIS (CMS-HCC): ICD-10-CM

## 2024-08-07 DIAGNOSIS — M54.50 CHRONIC LOW BACK PAIN, UNSPECIFIED BACK PAIN LATERALITY, UNSPECIFIED WHETHER SCIATICA PRESENT: ICD-10-CM

## 2024-08-07 DIAGNOSIS — Z00.00 ROUTINE GENERAL MEDICAL EXAMINATION AT HEALTH CARE FACILITY: Primary | ICD-10-CM

## 2024-08-07 DIAGNOSIS — M25.561 CHRONIC PAIN OF BOTH KNEES: ICD-10-CM

## 2024-08-07 DIAGNOSIS — G89.29 CHRONIC PAIN OF BOTH KNEES: ICD-10-CM

## 2024-08-07 PROCEDURE — G0439 PPPS, SUBSEQ VISIT: HCPCS | Performed by: STUDENT IN AN ORGANIZED HEALTH CARE EDUCATION/TRAINING PROGRAM

## 2024-08-07 PROCEDURE — 3008F BODY MASS INDEX DOCD: CPT | Performed by: STUDENT IN AN ORGANIZED HEALTH CARE EDUCATION/TRAINING PROGRAM

## 2024-08-07 PROCEDURE — 1159F MED LIST DOCD IN RCRD: CPT | Performed by: STUDENT IN AN ORGANIZED HEALTH CARE EDUCATION/TRAINING PROGRAM

## 2024-08-07 PROCEDURE — 3078F DIAST BP <80 MM HG: CPT | Performed by: STUDENT IN AN ORGANIZED HEALTH CARE EDUCATION/TRAINING PROGRAM

## 2024-08-07 PROCEDURE — 1160F RVW MEDS BY RX/DR IN RCRD: CPT | Performed by: STUDENT IN AN ORGANIZED HEALTH CARE EDUCATION/TRAINING PROGRAM

## 2024-08-07 PROCEDURE — 1123F ACP DISCUSS/DSCN MKR DOCD: CPT | Performed by: STUDENT IN AN ORGANIZED HEALTH CARE EDUCATION/TRAINING PROGRAM

## 2024-08-07 PROCEDURE — 3075F SYST BP GE 130 - 139MM HG: CPT | Performed by: STUDENT IN AN ORGANIZED HEALTH CARE EDUCATION/TRAINING PROGRAM

## 2024-08-07 PROCEDURE — 1170F FXNL STATUS ASSESSED: CPT | Performed by: STUDENT IN AN ORGANIZED HEALTH CARE EDUCATION/TRAINING PROGRAM

## 2024-08-07 PROCEDURE — 1157F ADVNC CARE PLAN IN RCRD: CPT | Performed by: STUDENT IN AN ORGANIZED HEALTH CARE EDUCATION/TRAINING PROGRAM

## 2024-08-07 PROCEDURE — 99214 OFFICE O/P EST MOD 30 MIN: CPT | Performed by: STUDENT IN AN ORGANIZED HEALTH CARE EDUCATION/TRAINING PROGRAM

## 2024-08-07 RX ORDER — OMEGA-3-ACID ETHYL ESTERS 1 G/1
1 CAPSULE, LIQUID FILLED ORAL 2 TIMES DAILY
COMMUNITY

## 2024-08-07 RX ORDER — MULTIVITAMIN/IRON/FOLIC ACID 18MG-0.4MG
1 TABLET ORAL DAILY
COMMUNITY

## 2024-08-07 ASSESSMENT — ACTIVITIES OF DAILY LIVING (ADL)
DOING_HOUSEWORK: INDEPENDENT
GROCERY_SHOPPING: INDEPENDENT
DRESSING: INDEPENDENT
MANAGING_FINANCES: INDEPENDENT
TAKING_MEDICATION: INDEPENDENT
BATHING: INDEPENDENT

## 2024-08-07 NOTE — PROGRESS NOTES
"Subjective   Reason for Visit: Annie Joseph is an 69 y.o. female here for a Medicare Wellness visit.     Past Medical, Surgical, and Family History reviewed and updated in chart.           HPI   ED visit 7/23/24 for lower extremity swelling   Duplex waws   Improving since her visit   Concerns for renal cause     Cardiology  Statin has returned     Pelvic floor PT   Aqua Therapy order planning for start    Obesity   Too tired to cook   Planning for intermittent fasting  Struggles with carbs and night time snacking    Patient Care Team:  Slime Mcdermott DO as PCP - General (Family Medicine)  Slime Mcdermott DO as PCP - [facility] ACO Attributed Provider  Demetria Dunham MD (Internal Medicine)     Objective   Vitals:  /60   Pulse (!) 42   Ht 1.575 m (5' 2\")   Wt 93.6 kg (206 lb 6.4 oz)   SpO2 96%   BMI 37.75 kg/m²       Physical Exam  Constitutional:       General: She is not in acute distress.     Appearance: She is not ill-appearing.   HENT:      Right Ear: Tympanic membrane and ear canal normal.      Left Ear: Tympanic membrane and ear canal normal.      Mouth/Throat:      Mouth: Mucous membranes are moist.      Pharynx: Oropharynx is clear. No oropharyngeal exudate or posterior oropharyngeal erythema.   Eyes:      Extraocular Movements: Extraocular movements intact.      Conjunctiva/sclera: Conjunctivae normal.      Pupils: Pupils are equal, round, and reactive to light.   Neck:      Vascular: No carotid bruit.   Cardiovascular:      Rate and Rhythm: Normal rate and regular rhythm.      Heart sounds: No murmur heard.     No gallop.   Pulmonary:      Effort: Pulmonary effort is normal.      Breath sounds: Normal breath sounds. No wheezing, rhonchi or rales.   Abdominal:      General: Abdomen is flat. Bowel sounds are normal.      Palpations: Abdomen is soft.      Tenderness: There is no abdominal tenderness.   Musculoskeletal:      Cervical back: Neck supple.      Left lower leg: No edema.   Skin:     General: " Skin is warm and dry.      Findings: No rash.   Neurological:      General: No focal deficit present.      Mental Status: She is alert and oriented to person, place, and time.      Gait: Gait normal.   Psychiatric:         Mood and Affect: Mood normal.         Behavior: Behavior normal.         Assessment/Plan   Problem List Items Addressed This Visit             ICD-10-CM    Atherosclerotic heart disease of native coronary artery with other forms of angina pectoris (CMS-HCC) I25.118    Knee pain M25.569    Relevant Orders    Referral to Physical Therapy    Low back pain M54.50    Relevant Orders    Referral to Physical Therapy     Other Visit Diagnoses         Codes    Routine general medical examination at health care facility    -  Primary Z00.00    Relevant Orders    Referral to Dermatology    Weakness of pelvic floor     N81.89    Relevant Orders    Referral to Physical Therapy        Annie Joseph is a 69 year old female who presents to the office for follow up and mwv.     Anemia   Concerns for persist bleeding post   Could be causing some of her fatigue  has been gradually worsening.  Patient most recent hemoglobin approximately 10  This is likely contributing to her chronic fatigue  Patient denies any GI bleeding.There was an ulceration at the site of her previous polypectomy.  3 clips were placed at that time.  2/20/2024     HTN  Improved  132/60mmHg  Continued amlodipine 10 mg   Continued Valsartan 160 mg daily   Follow-up with cardiology  Physical exam was stable. Discussed maintaining diets such as DASH to help maintain normal Blood pressure. Encouraged regular exercise for a total of 120 min weekly. We will fu labs in 1-3 days. For now there will be no change in medical management. All questions and concerns were addressed. Pt will follow up in 4-6 months or sooner if needed.      Obesity   Pt would like to increase activity levels   Due to chronic arthritis aquatic therapy ordered   Discussed  portion sizes and activity recommendations         Renal Mass   S/p Left nephrectomy due to mass 4/23  Pending CT abdomin and pelvis fu   Cr 1.3, GFR 45  Repeat today      CKD  Stage 3a  Post left nephrectomy and long standing HTN   Health Maintenance   Topic Date Due    Derm Melanoma Skin Check  Never done    Pneumococcal Vaccine: 65+ Years (1 of 2 - PCV) 05/28/1961    Hepatitis C Screening  Never done    CKD: Urine Protein Screening  Never done    DTaP/Tdap/Td Vaccines (1 - Tdap) Never done    Zoster Vaccines (1 of 2) Never done    RSV Pregnant patients and/or  patients aged 60+ years (1 - 1-dose 60+ series) Never done    COVID-19 Vaccine (1 - 2023-24 season) Never done    Medicare Annual Wellness Visit (AWV)  02/07/2024    Influenza Vaccine (1) 09/01/2024    Mammogram  12/04/2024    Diabetes: Hemoglobin A1C  06/13/2025    Diabetes Screening  06/13/2025    Lipid Panel  10/26/2028    Colorectal Cancer Screening  01/19/2034    Bone Density Scan  Completed    HIB Vaccines  Aged Out    Hepatitis B Vaccines  Aged Out    IPV Vaccines  Aged Out    Hepatitis A Vaccines  Aged Out    Meningococcal Vaccine  Aged Out    Rotavirus Vaccines  Aged Out    HPV Vaccines  Aged Out    Irritable Bowel Syndrome  Discontinued

## 2024-08-12 DIAGNOSIS — E61.1 IRON DEFICIENCY: ICD-10-CM

## 2024-08-12 DIAGNOSIS — D50.9 IRON DEFICIENCY ANEMIA, UNSPECIFIED IRON DEFICIENCY ANEMIA TYPE: ICD-10-CM

## 2024-08-13 RX ORDER — FERROUS SULFATE 325(65) MG
1 TABLET ORAL DAILY
Qty: 90 TABLET | Refills: 1 | Status: SHIPPED | OUTPATIENT
Start: 2024-08-13

## 2024-08-20 NOTE — PROGRESS NOTES
Physical Therapy Pelvic Floor Evaluation    Patient Name: Annie Joseph  MRN: [id number]  Evaluation Date: 8/21/2024  Time Calculation  Start Time: 0830  Stop Time: 0930  Time Calculation (min): 60 min  PT Evaluation Time Entry  PT Evaluation (Moderate) Time Entry: 35     PT Therapeutic Procedures Time Entry  Therapeutic Exercise Time Entry: 15  Therapeutic Activity Time Entry: 10       Problem List Items Addressed This Visit             ICD-10-CM    Weakness of pelvic floor N81.89        Subjective    Precautions:  Precautions  Precautions Comment: Osteoporosis/penia    Pain:     No pain currently vaginally;  has pain all over from fibro and OA    PELVIC HISTORY:  Chief Complaint/Description of Symptoms:    Has been having incontinence for a while but worsening.  Eventually wants to participate in aquatic therapy for lumbar pain but can't until incontinence is better.  Has had a complex past medical history as of past few years.    Past Medical History:    Osteoporosis, Kidney Cancer with removal 2023, Heart attack 2022, pre diabetic, OA in numerous joints  Home Environment/Social Factors/Occupation:   , difficulty with stairs because of knee pain  Patient Primary Goal:   Wants to hold urine  PELVIC PAIN:     Occassional sharp pain in vagina, + rectal pain, usually 0/10 but can be 6/10   Not sexually active    MENSTRUAL HISTORY:  Post menopausal;      OB HISTORY:  2 vaginal deliveries -- episiotomy with both    BLADDER:     Daytime Voiding Frequency: every few hours -- doesn't make it to the bathroom  Nighttime Voiding Frequency: 1-2 times a night;    Excessive Urinary Urgency:  when gets up dribbles and peeing and can't get there  Unintentional urine loss: sometimes a lot or dribbles.  Usually happens with walking and happens when has urge and coughing and sneezing or laughing.    Able to completely empty bladder: doesn't feel like she empties bladder -- tries leaning forward and squatting to  force more pee out  Reports she has prolapse    BOWEL:     BM Frequency: every other or 2-3 days  Excessive Bowel Urgency: does have some urge -- and has had have BM in pants  Frequent constipation/straining/incomplete emptying: tends to be constipated  Taking probiotics which can help  Sometimes splints to help stool out;      FLUID/DIET INTAKE:  Diet is poor at this time.    Coffee or tea in morning, then all water;  except occasional soda;      EXERCISE:  Current exercise regime:   No because of back and knee pains, wants to participate in aquatic therapy but this will not be possible until incontinence is improved    Objective   POSTURE/ALIGNMENT:  Increase in lumbar lordosis, genu varum bilateral, forward head       ROM:    Hip AROM L R   Flexion WFL deg WFL deg   Abduction WFL deg WFL deg   External Rotation 50% deg 50% deg   Internal Rotation 50% deg 50% deg      MMT:  Hip MMT L R   Hip Flexion 4/5 4/5   Hip Extension 3-/5 3-/5   Hip Abduction 3+/5 3+/5   Hip Adduction 3+/5 3+/5      TA: fair TA activation, poor load transfer,   Diaphragmatic breathing with cues    FLEXIBILITY R/L:  Hamstrings: mild/mild    EXTERNAL MUSCLE PALPATION:  Has tender points through lumbar, gluteal, adductors knees from fibromyalgia    PELVIC FLOOR  Patient Position:  hooklying  EXTERNAL OBSERVATION:  Vulvar Assessment: redness, dry, irritated    EXTERNAL PALPATION:  Levator Ani: tender bilateral    Transverse perineum: tender      INTERNAL VAGINAL EXAMINATION WITH PATIENT CONSENT:  Patient position:  hooklying  Observation:  Dry and red tissue   Internal:  Superficial layer mm   tightness  Deep layer:  Tender iliococcygeus and pubococcygeus R>L  Strength  2/5  Prolapse  Grade 1 cystocele, grade 2-3 rectocele    Outcome Measures:  NIH-CPSI: 26   Pain: 7  Urinary: 8  Quality of Life Impact: 11     Treatments:  Therapeutic exercise:    Focused on breathing and importance of and coordinating with kegel contraction  Access Code:  [email or web address]  URL: [email or web address]  Date: 08/21/2024  Prepared by: Filomena Loomis    Exercises  - Supine Diaphragmatic Breathing  - 1 x daily - 7 x weekly - 1 sets - 10 reps  - Supine Pelvic Floor Contraction  - 1 x daily - 7 x weekly - 1 sets - 10 reps  Therapeutic activity:    Discussed bladder irritants  Discussed using skin barrier to while wearing pads/briefs to protect skin (aquaphor or A&D ointmnet  OP EDUCATION:  Outpatient Education  Individual(s) Educated: Patient  Education Provided: Anatomy, Home Exercise Program, POC, Physiology  Risk and Benefits Discussed with Patient/Caregiver/Other: yes  Patient/Caregiver Demonstrated Understanding: yes  Plan of Care Discussed and Agreed Upon: yes  Patient Response to Education: Patient/Caregiver Verbalized Understanding of Information, Patient/Caregiver Performed Return Demonstration of Exercises/Activities, Patient/Caregiver Asked Appropriate Questions    Assessment     PT Assessment Results: Decreased strength, Decreased endurance, Decreased coordination, Pain, Decreased skin integrity  Rehab Prognosis: Good  Evaluation/Treatment Tolerance: Patient tolerated treatment well    Pt is a 69 y.o. female who presents with impairments of weakness, tightness, impaired coordination to manage IAP. These impairments have led to functional limitations including urge and stress incontinence and symptoms of rectocele including splinting to remove stool. Pt would benefit from skilled physical therapy intervention to improve above impairments and facilitate return to function.     Plan:  Treatment/Interventions: Biofeedback, Dry needling, Education/ Instruction, Electrical stimulation, Manual therapy, Neuromuscular re-education, Therapeutic activities, Therapeutic exercises  PT Plan: Skilled PT  PT Frequency: 1 time per week  Duration: 10 sessions  Certification Period Start Date: 08/21/24  Certification Period End Date: 11/19/24  Number of Treatments Authorized:  medical necessity  Rehab Potential: Good  Plan of Care Agreement: Patient  Goals:  Active       Pelvic Floor        STGs -- 5 sessions       Start:  08/21/24    Expected End:  10/05/24       1)  Patient will be knowledgeable with regards to downtraining techniques to improve relaxation of pelvic floor muscles  2)  Patient will report 25% less pain/symptoms during walking to bathroom  3)  Patient will perform diaphragmatic breathing properly to relax and contract pelvic floor muscle appropriately  4)  Patient will demonstrate good transverse abdominis activation to stabilize lumbopelvic girdle during ADLs  5)  Patient will report successful use of urge suppression strategies at least 50% of the time                      LTGs -- 10 sessions       Start:  08/21/24    Expected End:  11/19/24       1)  Patient will report improved sleep with less waking to void (will only wake 1-2 times a night)   2)  Patient will increase tolerance to internal penetration for examination with min to nil pain  3)  Patient will reduce urine loss to nil during walking to bathroom  4)  Patient will improve pelvic floor contraction to by 1/2-1 MMT  with coordinated exhale for improved continence          Patient Stated Goal 1       Start:  08/21/24    Expected End:  11/19/24       Wants to improve ability to hold urine

## 2024-08-21 ENCOUNTER — APPOINTMENT (OUTPATIENT)
Dept: AUDIOLOGY | Facility: CLINIC | Age: 69
End: 2024-08-21
Payer: MEDICARE

## 2024-08-21 ENCOUNTER — EVALUATION (OUTPATIENT)
Dept: PHYSICAL THERAPY | Facility: CLINIC | Age: 69
End: 2024-08-21
Payer: MEDICARE

## 2024-08-21 DIAGNOSIS — H91.90 HEARING LOSS, UNSPECIFIED HEARING LOSS TYPE, UNSPECIFIED LATERALITY: ICD-10-CM

## 2024-08-21 DIAGNOSIS — N81.89 WEAKNESS OF PELVIC FLOOR: ICD-10-CM

## 2024-08-21 DIAGNOSIS — H93.293 ABNORMAL AUDITORY PERCEPTION OF BOTH EARS: ICD-10-CM

## 2024-08-21 DIAGNOSIS — H90.3 SENSORINEURAL HEARING LOSS (SNHL) OF BOTH EARS: Primary | ICD-10-CM

## 2024-08-21 PROCEDURE — 92550 TYMPANOMETRY & REFLEX THRESH: CPT | Performed by: AUDIOLOGIST

## 2024-08-21 PROCEDURE — 92557 COMPREHENSIVE HEARING TEST: CPT | Performed by: AUDIOLOGIST

## 2024-08-21 PROCEDURE — 97110 THERAPEUTIC EXERCISES: CPT | Mod: GP | Performed by: PHYSICAL THERAPIST

## 2024-08-21 PROCEDURE — 97162 PT EVAL MOD COMPLEX 30 MIN: CPT | Mod: GP | Performed by: PHYSICAL THERAPIST

## 2024-08-21 PROCEDURE — 97530 THERAPEUTIC ACTIVITIES: CPT | Mod: GP | Performed by: PHYSICAL THERAPIST

## 2024-08-21 NOTE — PROGRESS NOTES
AUDIOLOGY ADULT AUDIOMETRIC EVALUATION    Name:  Annie Joseph  :  1955  Age:  69 y.o.  Date of Evaluation:  2024    Reason for visit: Ms. Joseph is seen in the clinic today at the request of otolaryngology for an audiologic evaluation.     HISTORY  Patient complains of itchy ears and a feeling of plugged or stuffy ears and has a history of allergies. She has not worked in noise and denies dizziness and tinnitus and fullness.    EVALUATION  See scanned audiogram: “Media” > “Audiology Report”.      RESULTS  Otoscopic Evaluation:  Right Ear: clear ear canal  Left Ear: clear ear canal    Immittance Measures:  Tympanometry:  Right Ear: Type A, normal tympanic membrane mobility with normal middle ear pressure   Left Ear: Type A, normal tympanic membrane mobility with normal middle ear pressure     Acoustic Reflexes:  Ipsilateral Right Ear:  95 100 100 100   Ipsilateral Left Ear:    100 100 100 NR  Contralateral Right Ear: did not evaluate  Contralateral Left Ear: did not evaluate    Distortion Product Otoacoustic Emissions (DPOAEs):  Right Ear: PASS: 1,1.5, 2,5,6  REFER:  3,4,8 K HZ  Left Ear:   PASS: 2,3,4,5         REFER:  1, 1.5, 6, 8.    Audiometry:  Test Technique and Reliability: BEHAVIORAL   Standard audiometry via supra-aural headphones. Reliability is good.    Pure tone air and bone conduction audiometry:  Right Ear:  WITHIN NORMAL LIMITS TO 3 K HZ WITH A MILD SNHL AT 4-8 K HZ.  Left Ear: WITHIN NORMAL LIMITS TO 3 K HZ WITH A MILD MODERATE SNHL AT 4- 8 K HZ.     Speech Audiometry (Word Recognition Scores):   Right Ear:  96% GOOD  Left Ear:    96% GOOD    IMPRESSIONS   WITHIN NORMAL LIMLILTS TO 3 K HZ WITH A MILD MODERATE SNHL AT 4-8 K HZ WITH ALLERGIES AND ITCHY, STUFFY EARS.  The presence of acoustic reflexes within normal intensity limits is consistent with normal middle ear and brainstem function, and suggests that auditory sensitivity is not significantly impaired. An elevated or  absent acoustic reflex threshold is consistent with a middle ear disorder, hearing loss in the stimulated ear, and/or interruption of neural innervation of the stapedius muscle. Present DPOAEs suggest normal/near normal cochlear outer hair cell function and are consistent with no greater than a mild hearing loss at those frequencies. Absent DPOAEs are consistent with abnormal cochlear outer hair cell function and some degree of hearing loss at those frequencies.    RECOMMENDATIONS  - Follow up with otolaryngology  as scheduled.  - Audiologic evaluation as needed.  - Annual audiologic evaluation, sooner if an acute change is noted.  - Audiologic evaluation in conjunction with otologic care, if an acute change is noted, and/or annually.  id maintenance, sooner if questions/problems arise.  - Follow-up with medical care team as planned.    PATIENT EDUCATION  Discussed results, impressions and recommendations with the patient. Questions were addressed and the patient was encouraged to contact our office should concerns arise.    Time for this encounter:35 MINUTES     Ana Lin  Licensed Audiologist

## 2024-08-26 ENCOUNTER — TREATMENT (OUTPATIENT)
Dept: PHYSICAL THERAPY | Facility: CLINIC | Age: 69
End: 2024-08-26
Payer: MEDICARE

## 2024-08-26 DIAGNOSIS — N81.89 WEAKNESS OF PELVIC FLOOR: ICD-10-CM

## 2024-08-26 PROCEDURE — 97110 THERAPEUTIC EXERCISES: CPT | Mod: GP | Performed by: PHYSICAL THERAPIST

## 2024-08-26 PROCEDURE — 97530 THERAPEUTIC ACTIVITIES: CPT | Mod: GP | Performed by: PHYSICAL THERAPIST

## 2024-08-26 NOTE — PROGRESS NOTES
Physical Therapy Treatment    Patient Name: Annie Joseph  MRN: [id number]  Encounter date:  8/26/2024  Time Calculation  Start Time: 1030  Stop Time: 1130  Time Calculation (min): 60 min     PT Therapeutic Procedures Time Entry  Therapeutic Exercise Time Entry: 30  Therapeutic Activity Time Entry: 25    Visit Number:  2 (including evaluation)  Planned total visits: 10 per POC  Visits Authorized/Insurance Coverage:  MEDICARE A/B, MMO [place], [place], [place] AUTH ($0 USED PT/ST )     Current Problem  Problem List Items Addressed This Visit             ICD-10-CM    Weakness of pelvic floor N81.89       Precautions     Numerous pain complaints -- all over   Pain     Pain complaints all over 3-4/10  Subjective  General        Realizes that the kegel is very difficult.  Has been very tired and feels like this week has been blur.  Didn't do many exercises    Objective  Challenged with coordinating kegel     Treatment:  Therapeutic activity  Discussed urge reduction strategies  Discussed breathing with transfers -- sit to stand with exhale on effort  Re-educated on breathing and influence on pelvic floor.    Therapeutic Exercise: with kegel and exhale on effort  Hooklying:    Hip adduction x 10   Hip abduction x 10 blue band  SKTC x 3 10 sec with towel -- added because back increased in symptoms  Clamshell L1 x 10      Current HEP:  Access Code: [email or web address]  URL: [email or web address]  Date: 08/26/2024  Prepared by: Filomena Loomis    Exercises  - Clamshell  - 1 x daily - 7 x weekly - 1 sets - 10 reps  - Supine Hip adduction isometric with Ball -- Exercise 1   - 1 x daily - 7 x weekly - 1 sets - 10 reps  - Hooklying Clamshell with Resistance -- Exercise 4   - 1 x daily - 7 x weekly - 1 sets - 10 reps  - Sit to Stand with Pelvic Floor Contraction  - 1 x daily - 7 x weekly - 1 sets - 1 reps    OP EDUCATION:   Updated HEP and urgency reduction strategies    Assessment:     Pt's response to treatment:  Patient needs much cueing  for coordination of breathing during exercises.  Patient is encouraged to gradually implement urge suppression techniques and focus on exercises.  LBP may impact ability to progress exercises.      Pain end of session: same    Plan:     Continue with current POC/no changes    Assessment of current progress against goals:  Progressing toward functional goals    Goals:  Active       Pelvic Floor        STGs -- 5 sessions       Start:  08/21/24    Expected End:  10/05/24       1)  Patient will be knowledgeable with regards to downtraining techniques to improve relaxation of pelvic floor muscles  2)  Patient will report 25% less pain/symptoms during walking to bathroom  3)  Patient will perform diaphragmatic breathing properly to relax and contract pelvic floor muscle appropriately  4)  Patient will demonstrate good transverse abdominis activation to stabilize lumbopelvic girdle during ADLs  5)  Patient will report successful use of urge suppression strategies at least 50% of the time                      LTGs -- 10 sessions       Start:  08/21/24    Expected End:  11/19/24       1)  Patient will report improved sleep with less waking to void (will only wake 1-2 times a night)   2)  Patient will increase tolerance to internal penetration for examination with min to nil pain  3)  Patient will reduce urine loss to nil during walking to bathroom  4)  Patient will improve pelvic floor contraction to by 1/2-1 MMT  with coordinated exhale for improved continence          Patient Stated Goal 1       Start:  08/21/24    Expected End:  11/19/24       Wants to improve ability to hold urine

## 2024-09-04 ENCOUNTER — TELEPHONE (OUTPATIENT)
Dept: PRIMARY CARE | Facility: CLINIC | Age: 69
End: 2024-09-04
Payer: MEDICARE

## 2024-09-04 DIAGNOSIS — M79.7 FIBROMYALGIA: ICD-10-CM

## 2024-09-04 RX ORDER — DULOXETIN HYDROCHLORIDE 60 MG/1
60 CAPSULE, DELAYED RELEASE ORAL DAILY
Qty: 90 CAPSULE | Refills: 0 | Status: SHIPPED | OUTPATIENT
Start: 2024-09-04

## 2024-09-04 RX ORDER — METHOCARBAMOL 500 MG/1
500 TABLET, FILM COATED ORAL NIGHTLY
Qty: 90 TABLET | Refills: 0 | Status: SHIPPED | OUTPATIENT
Start: 2024-09-04 | End: 2024-12-03

## 2024-09-04 NOTE — TELEPHONE ENCOUNTER
Pt needs refill on methocarbamol and Duloxetine sent to [facility] pharmacy on [facility] in [place].

## 2024-09-10 ENCOUNTER — APPOINTMENT (OUTPATIENT)
Dept: PHYSICAL THERAPY | Facility: CLINIC | Age: 69
End: 2024-09-10
Payer: MEDICARE

## 2024-09-10 ENCOUNTER — DOCUMENTATION (OUTPATIENT)
Dept: PHYSICAL THERAPY | Facility: CLINIC | Age: 69
End: 2024-09-10
Payer: MEDICARE

## 2024-09-10 NOTE — PROGRESS NOTES
Physical Therapy                 Therapy Communication Note    Patient Name: Annie Joseph  MRN: [id number]  Today's Date: 9/10/2024     Discipline: Physical Therapy    Missed Visit Reason:  cancelled because of poorly sleeping and too much overall pain    Missed Time: Cancel

## 2024-09-16 ENCOUNTER — TREATMENT (OUTPATIENT)
Dept: PHYSICAL THERAPY | Facility: CLINIC | Age: 69
End: 2024-09-16
Payer: MEDICARE

## 2024-09-16 DIAGNOSIS — N81.89 WEAKNESS OF PELVIC FLOOR: ICD-10-CM

## 2024-09-16 PROCEDURE — 97110 THERAPEUTIC EXERCISES: CPT | Mod: GP | Performed by: PHYSICAL THERAPIST

## 2024-09-16 PROCEDURE — 97530 THERAPEUTIC ACTIVITIES: CPT | Mod: GP | Performed by: PHYSICAL THERAPIST

## 2024-09-16 NOTE — PROGRESS NOTES
Physical Therapy Treatment    Patient Name: Annie Joseph  MRN: [id number]  Encounter date:  9/16/2024  Time Calculation  Start Time: 1130  Stop Time: 1230  Time Calculation (min): 60 min     PT Therapeutic Procedures Time Entry  Therapeutic Exercise Time Entry: 30  Therapeutic Activity Time Entry: 25    Visit Number:  3 (including evaluation)  Planned total visits: 10 per POC  Visits Authorized/Insurance Coverage:  MEDICARE A/B, MMO [place], [place], [place] AUTH ($0 USED PT/ST )     Current Problem  Problem List Items Addressed This Visit             ICD-10-CM    Weakness of pelvic floor N81.89       Precautions     Numerous pain complaints -- all over   Pain     Pain complaints all over 3-4/10  Subjective  General        Has been having a lot of joint pain, Monday is always a difficult day because of working a lot at [employer].  No fecal incontinence.  Pellets to mush stool consistency;  hasn't noticed much change;  can get back pain and sciatica when uses toilet;      Objective  Challenged with coordinating kegel     Treatment:  Therapeutic activity  Discussed urge reduction strategies  Discussed elimination techniques -- patient to try to avoid twisting to manually evacuate stool.  Patient may benefit from splinting instead to protect back (to prevent back pain and sciatica)   Encouraged patient to increase fiber   Therapeutic Exercise: with kegel and exhale on effort  Hooklying:    Hip adduction x 10   MIP x 10   Sitting hip adduction x 10   Sitting hip abduction x 10 blue  Standing heel raises x 10   Standing Marching x 10      Current HEP:  Access Code: [email or web address]  URL: [email or web address]  Date: 08/26/2024  Prepared by: Filomena Loomis    Exercises  - Clamshell  - 1 x daily - 7 x weekly - 1 sets - 10 reps  - Supine Hip adduction isometric with Ball -- Exercise 1   - 1 x daily - 7 x weekly - 1 sets - 10 reps  - Hooklying Clamshell with Resistance -- Exercise 4   - 1 x daily - 7 x weekly - 1 sets - 10 reps  - Sit  to Stand with Pelvic Floor Contraction  - 1 x daily - 7 x weekly - 1 sets - 1 reps    OP EDUCATION:   Updated HEP and urgency reduction strategies    Assessment:     LBP and other joint pains impacting ability to tolerate and perform exercises.  Patient with improved ability to coordinate breathing but challenged performing pelvic floor contraction.      Pain end of session: same    Plan:     Continue with current POC/no changes -- consider biofeedback and stim    Assessment of current progress against goals:  Progressing toward functional goals    Goals:  Active       Pelvic Floor        STGs -- 5 sessions       Start:  08/21/24    Expected End:  10/05/24       1)  Patient will be knowledgeable with regards to downtraining techniques to improve relaxation of pelvic floor muscles  2)  Patient will report 25% less pain/symptoms during walking to bathroom  3)  Patient will perform diaphragmatic breathing properly to relax and contract pelvic floor muscle appropriately  4)  Patient will demonstrate good transverse abdominis activation to stabilize lumbopelvic girdle during ADLs  5)  Patient will report successful use of urge suppression strategies at least 50% of the time                      LTGs -- 10 sessions       Start:  08/21/24    Expected End:  11/19/24       1)  Patient will report improved sleep with less waking to void (will only wake 1-2 times a night)   2)  Patient will increase tolerance to internal penetration for examination with min to nil pain  3)  Patient will reduce urine loss to nil during walking to bathroom  4)  Patient will improve pelvic floor contraction to by 1/2-1 MMT  with coordinated exhale for improved continence          Patient Stated Goal 1       Start:  08/21/24    Expected End:  11/19/24       Wants to improve ability to hold urine

## 2024-09-23 ENCOUNTER — TREATMENT (OUTPATIENT)
Dept: PHYSICAL THERAPY | Facility: CLINIC | Age: 69
End: 2024-09-23
Payer: MEDICARE

## 2024-09-23 DIAGNOSIS — N81.89 WEAKNESS OF PELVIC FLOOR: ICD-10-CM

## 2024-09-23 PROCEDURE — 97110 THERAPEUTIC EXERCISES: CPT | Mod: GP | Performed by: PHYSICAL THERAPIST

## 2024-09-23 PROCEDURE — 97530 THERAPEUTIC ACTIVITIES: CPT | Mod: GP | Performed by: PHYSICAL THERAPIST

## 2024-09-23 PROCEDURE — 97112 NEUROMUSCULAR REEDUCATION: CPT | Mod: GP | Performed by: PHYSICAL THERAPIST

## 2024-09-23 NOTE — PROGRESS NOTES
"    Physical Therapy Treatment    Patient Name: Annie Joseph  MRN: [id number]  Encounter date:  9/23/2024  Time Calculation  Start Time: 1235  Stop Time: 1328  Time Calculation (min): 53 min     PT Therapeutic Procedures Time Entry  Neuromuscular Re-Education Time Entry: 30  Therapeutic Exercise Time Entry: 10  Therapeutic Activity Time Entry: 13    Visit Number:  4 (including evaluation)  Planned total visits: 10 per POC  Visits Authorized/Insurance Coverage:  MEDICARE A/B, [place], [place], [place] ($0 USED PT/ST )     Current Problem  Problem List Items Addressed This Visit             ICD-10-CM    Weakness of pelvic floor N81.89       Precautions     Numerous pain complaints -- all over     Pain     Pain complaints all over 3-4/10    Subjective  General        Over weekend had 2 episodes of leaking a lot into brief.  Waited too longer -- like an hour to go to bathroom;  unsure how to splint so still digging out stool.      Objective  Difficulty holding Kegel    Treatment:  Therapeutic activity  Trying to increase fiber   Educated on splinting instead of \"digging out stool\"    Therapeutic Exercise: with kegel and exhale on effort  Re-educated on continuing exercises and focusing on breathing  Neuro-Re-education:    Used vaginal sensor for biofeedback;  used with kegel, breathing, and kegel with hip adduction;       Current HEP:  Access Code: [email or web address]  URL: [email or web address]  Date: 08/26/2024  Prepared by: Filomena Loomis    Exercises  - Clamshell  - 1 x daily - 7 x weekly - 1 sets - 10 reps  - Supine Hip adduction isometric with Ball -- Exercise 1   - 1 x daily - 7 x weekly - 1 sets - 10 reps  - Hooklying Clamshell with Resistance -- Exercise 4   - 1 x daily - 7 x weekly - 1 sets - 10 reps  - Sit to Stand with Pelvic Floor Contraction  - 1 x daily - 7 x weekly - 1 sets - 1 reps    OP EDUCATION:   Updated HEP and urgency reduction strategies    Assessment:     Patient had difficulty getting off examine " table and needed assist.  Patient continues to urinary incontinence when hold urine for too long.  Patient continues to need to manual evacuate stool and is encouraged to try splinting to assist.  Used biofeedback to improve kegel contraction and relationship of breathing with pelvic floor.      Pain end of session: same    Plan:     Continue with current POC/no changes -- consider biofeedback again;      Assessment of current progress against goals:  Progressing toward functional goals    Goals:  Active       Pelvic Floor        STGs -- 5 sessions       Start:  08/21/24    Expected End:  10/05/24       1)  Patient will be knowledgeable with regards to downtraining techniques to improve relaxation of pelvic floor muscles  2)  Patient will report 25% less pain/symptoms during walking to bathroom  3)  Patient will perform diaphragmatic breathing properly to relax and contract pelvic floor muscle appropriately  4)  Patient will demonstrate good transverse abdominis activation to stabilize lumbopelvic girdle during ADLs  5)  Patient will report successful use of urge suppression strategies at least 50% of the time                      LTGs -- 10 sessions       Start:  08/21/24    Expected End:  11/19/24       1)  Patient will report improved sleep with less waking to void (will only wake 1-2 times a night)   2)  Patient will increase tolerance to internal penetration for examination with min to nil pain  3)  Patient will reduce urine loss to nil during walking to bathroom  4)  Patient will improve pelvic floor contraction to by 1/2-1 MMT  with coordinated exhale for improved continence          Patient Stated Goal 1       Start:  08/21/24    Expected End:  11/19/24       Wants to improve ability to hold urine

## 2024-09-30 ENCOUNTER — TREATMENT (OUTPATIENT)
Dept: PHYSICAL THERAPY | Facility: CLINIC | Age: 69
End: 2024-09-30
Payer: MEDICARE

## 2024-09-30 DIAGNOSIS — N81.89 WEAKNESS OF PELVIC FLOOR: ICD-10-CM

## 2024-09-30 PROCEDURE — 97110 THERAPEUTIC EXERCISES: CPT | Mod: GP | Performed by: PHYSICAL THERAPIST

## 2024-09-30 PROCEDURE — 97530 THERAPEUTIC ACTIVITIES: CPT | Mod: GP | Performed by: PHYSICAL THERAPIST

## 2024-09-30 PROCEDURE — 97112 NEUROMUSCULAR REEDUCATION: CPT | Mod: GP | Performed by: PHYSICAL THERAPIST

## 2024-09-30 NOTE — PROGRESS NOTES
Physical Therapy Treatment    Patient Name: Annie Joseph  MRN: [id number]  Encounter date:  9/30/2024  Time Calculation  Start Time: 1230  Stop Time: 1335  Time Calculation (min): 65 min     PT Therapeutic Procedures Time Entry  Neuromuscular Re-Education Time Entry: 30  Therapeutic Exercise Time Entry: 12  Therapeutic Activity Time Entry: 18    Visit Number:  5 (including evaluation)  Planned total visits: 10 per POC  Visits Authorized/Insurance Coverage:  MEDICARE A/B, [place], [place] ($0 USED PT/ST )     Current Problem  Problem List Items Addressed This Visit             ICD-10-CM    Weakness of pelvic floor N81.89       Precautions     Numerous pain complaints -- all over  Osteopenia  Kidney cancer with removal 2023  Pain     Pain complaints all over 3-4/10    Subjective  General        If waits to long then has leaking that can't bee stopped.  Patient reports squatty potty helps but still needs to manually elimination stool      Objective  Difficulty holding Kegel, nil resting tone noted during Biofeedback    Treatment:  Therapeutic activity  Trying to increase fiber and eat more veggies.    Discussed squatty potty and elimination position   Therapeutic Exercise: with kegel and exhale on effort  Re-educated on continuing exercises and focusing on breathing  Neuro-Re-education:    Used vaginal sensor for biofeedback;  used with kegel, breathing, and kegel with hip adduction;    Trial of estim 5 minutes 12:50 5:10 duty cycle with kegel to improve contraction;    Reviewed breathing and cued to avoid valsalva with exhale.       Current HEP:  Access Code: [email or web address]  URL: [email or web address]  Date: 08/26/2024  Prepared by: Filomena Loomis    Exercises  - Clamshell  - 1 x daily - 7 x weekly - 1 sets - 10 reps  - Supine Hip adduction isometric with Ball -- Exercise 1   - 1 x daily - 7 x weekly - 1 sets - 10 reps  - Hooklying Clamshell with Resistance -- Exercise 4   - 1 x daily - 7 x weekly - 1 sets  - 10 reps  - Sit to Stand with Pelvic Floor Contraction  - 1 x daily - 7 x weekly - 1 sets - 1 reps    OP EDUCATION:   Updated HEP and urgency reduction strategies    Assessment:     Patient continues to have nil to min resting tone as noted on biofeedback and difficulty holding kegel contraction.  Weakness of resting tone and kegel contraction with hold impacting incontinence.  Patient also tends to have valsalva maneuver when exhales and was cued to use less forceful exhale.       Pain end of session: same    Plan:     Continue with current POC/no changes -- consider biofeedback again;      Assessment of current progress against goals:  Progressing toward functional goals    Goals:  Active       Pelvic Floor        STGs -- 5 sessions       Start:  08/21/24    Expected End:  10/05/24       1)  Patient will be knowledgeable with regards to downtraining techniques to improve relaxation of pelvic floor muscles  2)  Patient will report 25% less pain/symptoms during walking to bathroom  3)  Patient will perform diaphragmatic breathing properly to relax and contract pelvic floor muscle appropriately  4)  Patient will demonstrate good transverse abdominis activation to stabilize lumbopelvic girdle during ADLs  5)  Patient will report successful use of urge suppression strategies at least 50% of the time                      LTGs -- 10 sessions       Start:  08/21/24    Expected End:  11/19/24       1)  Patient will report improved sleep with less waking to void (will only wake 1-2 times a night)   2)  Patient will increase tolerance to internal penetration for examination with min to nil pain  3)  Patient will reduce urine loss to nil during walking to bathroom  4)  Patient will improve pelvic floor contraction to by 1/2-1 MMT  with coordinated exhale for improved continence          Patient Stated Goal 1       Start:  08/21/24    Expected End:  11/19/24       Wants to improve ability to hold urine

## 2024-10-03 DIAGNOSIS — K21.9 HIATAL HERNIA WITH GERD: ICD-10-CM

## 2024-10-03 DIAGNOSIS — K44.9 HIATAL HERNIA WITH GERD: ICD-10-CM

## 2024-10-03 RX ORDER — PANTOPRAZOLE SODIUM 40 MG/1
40 TABLET, DELAYED RELEASE ORAL DAILY
Qty: 90 TABLET | Refills: 1 | Status: SHIPPED | OUTPATIENT
Start: 2024-10-03

## 2024-10-07 ENCOUNTER — DOCUMENTATION (OUTPATIENT)
Dept: PHYSICAL THERAPY | Facility: CLINIC | Age: 69
End: 2024-10-07
Payer: MEDICARE

## 2024-10-07 ENCOUNTER — APPOINTMENT (OUTPATIENT)
Dept: PHYSICAL THERAPY | Facility: CLINIC | Age: 69
End: 2024-10-07
Payer: MEDICARE

## 2024-10-07 NOTE — PROGRESS NOTES
Physical Therapy                 Therapy Communication Note    Patient Name: Annie Joseph  MRN: [id number]  Today's Date: 10/7/2024     Discipline: Physical Therapy    Missed Visit Reason:  not feeling well (late cancel)    Missed Time: Cancel

## 2024-10-09 ENCOUNTER — TREATMENT (OUTPATIENT)
Dept: PHYSICAL THERAPY | Facility: CLINIC | Age: 69
End: 2024-10-09
Payer: MEDICARE

## 2024-10-09 DIAGNOSIS — N81.89 WEAKNESS OF PELVIC FLOOR: ICD-10-CM

## 2024-10-09 PROCEDURE — 97110 THERAPEUTIC EXERCISES: CPT | Mod: GP | Performed by: PHYSICAL THERAPIST

## 2024-10-09 PROCEDURE — 97530 THERAPEUTIC ACTIVITIES: CPT | Mod: GP | Performed by: PHYSICAL THERAPIST

## 2024-10-09 PROCEDURE — 97112 NEUROMUSCULAR REEDUCATION: CPT | Mod: GP | Performed by: PHYSICAL THERAPIST

## 2024-10-09 NOTE — PROGRESS NOTES
Physical Therapy Treatment    Patient Name: Annie Joseph  MRN: [id number]  Encounter date:  10/9/2024  Time Calculation  Start Time: 0930  Stop Time: 1030  Time Calculation (min): 60 min     PT Therapeutic Procedures Time Entry  Neuromuscular Re-Education Time Entry: 30  Therapeutic Exercise Time Entry: 20  Therapeutic Activity Time Entry: 8    Visit Number:  6 (including evaluation)  Planned total visits: 10 per POC  Visits Authorized/Insurance Coverage:  MEDICARE A/B, [place], [place], [place] ($0 USED PT/ST )     Current Problem  Problem List Items Addressed This Visit             ICD-10-CM    Weakness of pelvic floor N81.89         Precautions     Numerous pain complaints -- all over  Osteopenia  Kidney cancer with removal 2023  Pain     Pain complaints all over 2-3/10    Subjective  General        Patient reporting that she still has incontinence when gets out of recliner;  Also was sick earlier in weak as was constipated.  Working on breathing    Objective  Improved resting tone as noted with biofeedback.      Treatment:  Therapeutic activity  Discussed avoiding sitting in recliner as increase in incontinence when lowers legs -- enocuraged placing Legs on stool or ottoman;    Encouraged being mindful of sensation to urninate and mindful.    Therapeutic Exercise: with kegel and exhale on effort  Hip adduction x 10   PPT x 10   Hip abduction blue x 10  Supine march x 10   LTR x 10   SKTC x 3 3 breathes  Ball rolls with red theraball x 3 3 breathes  Neuro-Re-education:    Used vaginal sensor for biofeedback;  used with kegel, breathing, and kegel with hip adduction;  hip abduction with blue band  Reviewed breathing and cued to avoid valsalva with exhale.       Current HEP:  Access Code: [email or web address]  URL: [email or web address]  Date: 10/09/2024  Prepared by: Filomena Loomis    Exercises  - Supine Posterior Pelvic Tilt  - 1 x daily - 7 x weekly - 1 sets - 10 reps  - Supine March  - 1 x daily - 7 x weekly - 1  sets - 10 reps  Access Code: [email or web address]  URL: [email or web address]  Date: 08/26/2024  Prepared by: Filomena Loomis    Exercises  - Clamshell  - 1 x daily - 7 x weekly - 1 sets - 10 reps  - Supine Hip adduction isometric with Ball -- Exercise 1   - 1 x daily - 7 x weekly - 1 sets - 10 reps  - Hooklying Clamshell with Resistance -- Exercise 4   - 1 x daily - 7 x weekly - 1 sets - 10 reps  - Sit to Stand with Pelvic Floor Contraction  - 1 x daily - 7 x weekly - 1 sets - 1 reps    OP EDUCATION:   Updated HEP and urgency reduction strategies    Assessment:     Patient with improved resting tone and ability to coordinate breathing and kegel contraction this date with use of biofeedback.  Patient continues to complain of back pain with minimal exercise performed in clinic.  Patient is encouraged to discuss LBP with physician as this can impact tolerance to PT.  May benefit from medication to assist in incontinence.      Pain end of session: Back pain 3/10    Plan:     Continue with current POC/no changes -- consider biofeedback again;      Assessment of current progress against goals:  Progressing toward functional goals    Goals:  Active       Pelvic Floor        STGs -- 5 sessions       Start:  08/21/24    Expected End:  10/05/24       1)  Patient will be knowledgeable with regards to downtraining techniques to improve relaxation of pelvic floor muscles  2)  Patient will report 25% less pain/symptoms during walking to bathroom  3)  Patient will perform diaphragmatic breathing properly to relax and contract pelvic floor muscle appropriately  4)  Patient will demonstrate good transverse abdominis activation to stabilize lumbopelvic girdle during ADLs  5)  Patient will report successful use of urge suppression strategies at least 50% of the time                      LTGs -- 10 sessions       Start:  08/21/24    Expected End:  11/19/24       1)  Patient will report improved sleep with less waking to void (will only wake  1-2 times a night)   2)  Patient will increase tolerance to internal penetration for examination with min to nil pain  3)  Patient will reduce urine loss to nil during walking to bathroom  4)  Patient will improve pelvic floor contraction to by 1/2-1 MMT  with coordinated exhale for improved continence          Patient Stated Goal 1       Start:  08/21/24    Expected End:  11/19/24       Wants to improve ability to hold urine

## 2024-10-14 ENCOUNTER — APPOINTMENT (OUTPATIENT)
Dept: PHYSICAL THERAPY | Facility: CLINIC | Age: 69
End: 2024-10-14
Payer: MEDICARE

## 2024-10-14 ENCOUNTER — DOCUMENTATION (OUTPATIENT)
Dept: PHYSICAL THERAPY | Facility: CLINIC | Age: 69
End: 2024-10-14
Payer: MEDICARE

## 2024-10-14 NOTE — PROGRESS NOTES
Physical Therapy                 Therapy Communication Note    Patient Name: Annie Joseph  MRN: [id number]  Today's Date: 10/14/2024     Discipline: Physical Therapy      Missed Time: Cancel

## 2024-10-21 ENCOUNTER — APPOINTMENT (OUTPATIENT)
Dept: PHYSICAL THERAPY | Facility: CLINIC | Age: 69
End: 2024-10-21
Payer: MEDICARE

## 2024-10-29 ENCOUNTER — TREATMENT (OUTPATIENT)
Dept: PHYSICAL THERAPY | Facility: CLINIC | Age: 69
End: 2024-10-29
Payer: MEDICARE

## 2024-10-29 DIAGNOSIS — N81.89 WEAKNESS OF PELVIC FLOOR: ICD-10-CM

## 2024-10-29 PROCEDURE — 97110 THERAPEUTIC EXERCISES: CPT | Mod: GP | Performed by: PHYSICAL THERAPIST

## 2024-10-29 PROCEDURE — 97112 NEUROMUSCULAR REEDUCATION: CPT | Mod: GP | Performed by: PHYSICAL THERAPIST

## 2024-10-30 ENCOUNTER — HOSPITAL ENCOUNTER (OUTPATIENT)
Dept: RADIOLOGY | Facility: HOSPITAL | Age: 69
Discharge: HOME | End: 2024-10-30
Payer: MEDICARE

## 2024-10-30 DIAGNOSIS — C64.2 MALIGNANT NEOPLASM OF LEFT KIDNEY, EXCEPT RENAL PELVIS (MULTI): ICD-10-CM

## 2024-10-30 PROCEDURE — 74176 CT ABD & PELVIS W/O CONTRAST: CPT

## 2024-11-07 ENCOUNTER — APPOINTMENT (OUTPATIENT)
Dept: PHYSICAL THERAPY | Facility: CLINIC | Age: 69
End: 2024-11-07
Payer: MEDICARE

## 2024-11-07 ENCOUNTER — DOCUMENTATION (OUTPATIENT)
Dept: PHYSICAL THERAPY | Facility: CLINIC | Age: 69
End: 2024-11-07

## 2024-11-07 NOTE — PROGRESS NOTES
Physical Therapy                 Therapy Communication Note    Patient Name: Annie Joseph  MRN: [id number]  Today's Date: 11/7/2024     Discipline: Physical Therapy    Missed Time: No Show

## 2024-11-13 ENCOUNTER — APPOINTMENT (OUTPATIENT)
Dept: ALLERGY | Facility: CLINIC | Age: 69
End: 2024-11-13
Payer: MEDICARE

## 2024-11-13 ENCOUNTER — TREATMENT (OUTPATIENT)
Dept: PHYSICAL THERAPY | Facility: CLINIC | Age: 69
End: 2024-11-13
Payer: MEDICARE

## 2024-11-13 DIAGNOSIS — N81.89 WEAKNESS OF PELVIC FLOOR: ICD-10-CM

## 2024-11-13 PROCEDURE — 97110 THERAPEUTIC EXERCISES: CPT | Mod: GP | Performed by: PHYSICAL THERAPIST

## 2024-11-13 PROCEDURE — 97530 THERAPEUTIC ACTIVITIES: CPT | Mod: GP | Performed by: PHYSICAL THERAPIST

## 2024-11-13 NOTE — PROGRESS NOTES
Physical Therapy Treatment    Patient Name: Annie Joseph  MRN: [id number]  Encounter date:  11/13/2024  Time Calculation  Start Time: 0940  Stop Time: 1035  Time Calculation (min): 55 min     PT Therapeutic Procedures Time Entry  Therapeutic Exercise Time Entry: 18  Therapeutic Activity Time Entry: 35    Visit Number:  8 (including evaluation)  Planned total visits: 10 per POC  Visits Authorized/Insurance Coverage:  MEDICARE A/B, [place], [place] ($0 USED PT/ST )     Current Problem  Problem List Items Addressed This Visit             ICD-10-CM    Weakness of pelvic floor N81.89       Precautions     Numerous pain complaints -- all over  Osteopenia  Kidney cancer with removal 2023  Pain     Pain complaints are mostly knee 5/10    Subjective  General        Reports she doesn't have incontinence when out in public but had 2 episodes yesterday  when sitting at home and went to get up to go to bathroom.  Eating prunes has been helpful.  Knee pains are worsening and feels like she needs to get into see surgeon but is worried.      Objective  Tends to forget proper breathing pattern    Treatment:  Therapeutic Exercise:   Patient to try to perform hip adduction with kegel a few times prior to transferring off chair to improve continence  Therapeutic Activity:    Discussed diet and importance of fiber -- patient reports that prunes have been helping with bowel movements.    Discussed setting timing at home to remind patient to use bathroom  Discussed possible pessary importance  Discussed how knee pain is influencing other symptoms  Discussed vaginal dryness and urethral prolapse-- consider vaginal moisturizer gel as patient doesn't want to use estrogen cream     Current HEP:  Access Code: [email or web address]  URL: [email or web address]  Date: 10/09/2024  Prepared by: Filomena Loomis    Exercises  - Supine Posterior Pelvic Tilt  - 1 x daily - 7 x weekly - 1 sets - 10 reps  - Supine March  - 1 x daily - 7 x weekly - 1  sets - 10 reps  Access Code: [email or web address]  URL: [email or web address]  Date: 08/26/2024  Prepared by: Filomena Loomis    Exercises  - Clamshell  - 1 x daily - 7 x weekly - 1 sets - 10 reps  - Supine Hip adduction isometric with Ball -- Exercise 1   - 1 x daily - 7 x weekly - 1 sets - 10 reps  - Hooklying Clamshell with Resistance -- Exercise 4   - 1 x daily - 7 x weekly - 1 sets - 10 reps  - Sit to Stand with Pelvic Floor Contraction  - 1 x daily - 7 x weekly - 1 sets - 1 reps    OP EDUCATION:   Updated HEP and urgency reduction strategies    Assessment:     Patient reporting that knee pain is very limiting at this time.  Recommended to contract physician regarding knee pain.  Knee pain and other pains are limiting ability to tolerate exercises for pelvic floor.  Session spent with much education on a variety of topics and ways to improve continence.  Patient still unable to participate in aquatics as remains incontinent daily.      Pain end of session: Back pain 3/10    Plan:     Continue with current POC/no changes      Assessment of current progress against goals:  Progressing toward functional goals    Goals:  Active       Pelvic Floor        STGs -- 5 sessions (Progressing)       Start:  08/21/24    Expected End:  10/05/24       1)  Patient will be knowledgeable with regards to downtraining techniques to improve relaxation of pelvic floor muscles  2)  Patient will report 25% less pain/symptoms during walking to bathroom  3)  Patient will perform diaphragmatic breathing properly to relax and contract pelvic floor muscle appropriately  4)  Patient will demonstrate good transverse abdominis activation to stabilize lumbopelvic girdle during ADLs  5)  Patient will report successful use of urge suppression strategies at least 50% of the time                      LTGs -- 10 sessions (Progressing)       Start:  08/21/24    Expected End:  11/19/24       1)  Patient will report improved sleep with less waking to void  (will only wake 1-2 times a night)   2)  Patient will increase tolerance to internal penetration for examination with min to nil pain  3)  Patient will reduce urine loss to nil during walking to bathroom  4)  Patient will improve pelvic floor contraction to by 1/2-1 MMT  with coordinated exhale for improved continence          Patient Stated Goal 1 (Progressing)       Start:  08/21/24    Expected End:  11/19/24       Wants to improve ability to hold urine

## 2024-11-14 ENCOUNTER — APPOINTMENT (OUTPATIENT)
Dept: UROLOGY | Facility: CLINIC | Age: 69
End: 2024-11-14
Payer: MEDICARE

## 2024-11-14 DIAGNOSIS — C64.2 MALIGNANT NEOPLASM OF LEFT KIDNEY (MULTI): Primary | ICD-10-CM

## 2024-11-14 PROCEDURE — 99203 OFFICE O/P NEW LOW 30 MIN: CPT | Performed by: SURGERY

## 2024-11-14 PROCEDURE — 1159F MED LIST DOCD IN RCRD: CPT | Performed by: SURGERY

## 2024-11-14 PROCEDURE — 1123F ACP DISCUSS/DSCN MKR DOCD: CPT | Performed by: SURGERY

## 2024-11-14 PROCEDURE — 1160F RVW MEDS BY RX/DR IN RCRD: CPT | Performed by: SURGERY

## 2024-11-14 PROCEDURE — 1157F ADVNC CARE PLAN IN RCRD: CPT | Performed by: SURGERY

## 2024-11-14 PROCEDURE — 1036F TOBACCO NON-USER: CPT | Performed by: SURGERY

## 2024-11-14 PROCEDURE — 1125F AMNT PAIN NOTED PAIN PRSNT: CPT | Performed by: SURGERY

## 2024-11-14 ASSESSMENT — PAIN SCALES - GENERAL: PAINLEVEL_OUTOF10: 3

## 2024-11-14 NOTE — PROGRESS NOTES
Subjective   Patient ID: Annie Joseph is a 69 y.o. female who presents for Establish Care.    HPI  She is well-known to me from my previous practice.  She has a history of renal cell carcinoma of the left kidney.  She underwent a radical nephrectomy in April 2023.  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  Today she is doing well.  She has no new complaints.  She is working on weight reduction.  She also switched to a new cholesterol medication per her cardiologist.  She denies any abdominal or flank pain.      Review of Systems  As per HPI, remaining 10 systems negative        Objective   Physical Exam  Well nourished  Breathing comfortably  Abdomen obese, soft, ND, NT  Mid line scar, no hernias              Assessment/Plan   Problem List Items Addressed This Visit             ICD-10-CM       Genitourinary and Reproductive    Renal mass, left - Primary N28.89          Left renal cell carcinoma.  She is status post radical nephrectomy.  I reviewed her CT scan from October.  There is no evidence of any disease recurrence.  Her right kidney is unremarkable.  I also reviewed her chest x-ray from June.  This is normal as well.  She will return and see me in 6 months for follow-up.        Adolfo Tobin MD 11/14/24 11:43 AM

## 2024-11-20 ENCOUNTER — APPOINTMENT (OUTPATIENT)
Dept: RHEUMATOLOGY | Facility: CLINIC | Age: 69
End: 2024-11-20
Payer: MEDICARE

## 2024-11-22 ENCOUNTER — APPOINTMENT (OUTPATIENT)
Dept: PHYSICAL THERAPY | Facility: CLINIC | Age: 69
End: 2024-11-22
Payer: MEDICARE

## 2024-11-22 DIAGNOSIS — N81.89 WEAKNESS OF PELVIC FLOOR: ICD-10-CM

## 2024-11-22 PROCEDURE — 97110 THERAPEUTIC EXERCISES: CPT | Mod: GP | Performed by: PHYSICAL THERAPIST

## 2024-11-22 PROCEDURE — 97530 THERAPEUTIC ACTIVITIES: CPT | Mod: GP | Performed by: PHYSICAL THERAPIST

## 2024-11-22 NOTE — PROGRESS NOTES
Physical Therapy Treatment/Discharge    Patient Name: Annie Joseph  MRN: [id number]  Encounter date:  11/22/2024  Time Calculation  Start Time: 0800  Stop Time: 0853  Time Calculation (min): 53 min     PT Therapeutic Procedures Time Entry  Therapeutic Exercise Time Entry: 13  Therapeutic Activity Time Entry: 40    Visit Number:  9 (including evaluation)  Planned total visits: 10 per POC  Visits Authorized/Insurance Coverage:  MEDICARE A/B, [place], [place], [place] ($0 USED PT/ST )     Current Problem  Problem List Items Addressed This Visit             ICD-10-CM    Weakness of pelvic floor N81.89       Precautions     Numerous pain complaints -- all over  Osteopenia  Kidney cancer with removal 2023  Pain     Pain complaints are mostly knee 5/10    Subjective  General        Has appointment for knee consultation in January.  Patient reporting she is very busy.  Rarely has incontinence when out of home, but when at house patient has incontinence-- tends to wait too long and ignore sensation    Objective  Tends to forget proper breathing pattern    Treatment:  Therapeutic Exercise:   Patient doing some of the exercises but admits to not being as adherent to program as is busy;  Dicussed to avoid exercises that increase knee and back pain;    Therapeutic Activity:    Prunes are helping constipation,  Looked at [employer] for vaginal moisturizer, but wanted to try [employer],   Discussed timed urination  use of squatty potty for improved stool and urine elimination     Current HEP:  Access Code: [email or web address]  URL: [email or web address]  Date: 10/09/2024  Prepared by: Filomena Loomis    Exercises  - Supine Posterior Pelvic Tilt  - 1 x daily - 7 x weekly - 1 sets - 10 reps  - Supine March  - 1 x daily - 7 x weekly - 1 sets - 10 reps  Access Code: [email or web address]  URL: [email or web address]  Date: 08/26/2024  Prepared by: Filomena Loomis    Exercises  - Clamshell  - 1 x daily - 7 x weekly - 1 sets - 10 reps  - Supine  Hip adduction isometric with Ball -- Exercise 1   - 1 x daily - 7 x weekly - 1 sets - 10 reps  - Hooklying Clamshell with Resistance -- Exercise 4   - 1 x daily - 7 x weekly - 1 sets - 10 reps  - Sit to Stand with Pelvic Floor Contraction  - 1 x daily - 7 x weekly - 1 sets - 1 reps    OP EDUCATION:   Updated HEP and urgency reduction strategies    Assessment:     Patient with numerous pain complaints from knees to back.  Patient has made minimal progress as other pains are impacting ability to perform exercises.  Patient to be discharged to [facility].  Will benefit from additional pelvic floor therapy after knees are improved.      Pain end of session: Back pain, knee pain 3/10    Plan:     Continue with current POC/no changes      Assessment of current progress against goals:  Progressing toward functional goals    Goals:  Active       Pelvic Floor        STGs -- 5 sessions (Progressing)       Start:  08/21/24    Expected End:  10/05/24       1)  Patient will be knowledgeable with regards to downtraining techniques to improve relaxation of pelvic floor muscles  2)  Patient will report 25% less pain/symptoms during walking to bathroom  3)  Patient will perform diaphragmatic breathing properly to relax and contract pelvic floor muscle appropriately  4)  Patient will demonstrate good transverse abdominis activation to stabilize lumbopelvic girdle during ADLs  5)  Patient will report successful use of urge suppression strategies at least 50% of the time                      LTGs -- 10 sessions (Progressing)       Start:  08/21/24    Expected End:  11/19/24       1)  Patient will report improved sleep with less waking to void (will only wake 1-2 times a night)   2)  Patient will increase tolerance to internal penetration for examination with min to nil pain  3)  Patient will reduce urine loss to nil during walking to bathroom  4)  Patient will improve pelvic floor contraction to by 1/2-1 MMT  with coordinated exhale for  improved continence          Patient Stated Goal 1 (Progressing)       Start:  08/21/24    Expected End:  11/19/24       Wants to improve ability to hold urine

## 2024-12-05 ENCOUNTER — APPOINTMENT (OUTPATIENT)
Dept: PHYSICAL THERAPY | Facility: CLINIC | Age: 69
End: 2024-12-05
Payer: MEDICARE

## 2024-12-09 ENCOUNTER — APPOINTMENT (OUTPATIENT)
Dept: OTOLARYNGOLOGY | Facility: CLINIC | Age: 69
End: 2024-12-09
Payer: MEDICARE

## 2024-12-09 VITALS — WEIGHT: 200 LBS | BODY MASS INDEX: 36.8 KG/M2 | HEIGHT: 62 IN

## 2024-12-09 DIAGNOSIS — J30.2 SEASONAL AND PERENNIAL ALLERGIC RHINITIS: Primary | ICD-10-CM

## 2024-12-09 DIAGNOSIS — H90.3 BILATERAL SENSORINEURAL HEARING LOSS: ICD-10-CM

## 2024-12-09 DIAGNOSIS — J30.89 SEASONAL AND PERENNIAL ALLERGIC RHINITIS: Primary | ICD-10-CM

## 2024-12-09 DIAGNOSIS — R09.81 NASAL CONGESTION: ICD-10-CM

## 2024-12-09 PROCEDURE — 99203 OFFICE O/P NEW LOW 30 MIN: CPT | Performed by: OTOLARYNGOLOGY

## 2024-12-09 PROCEDURE — 1036F TOBACCO NON-USER: CPT | Performed by: OTOLARYNGOLOGY

## 2024-12-09 PROCEDURE — 1159F MED LIST DOCD IN RCRD: CPT | Performed by: OTOLARYNGOLOGY

## 2024-12-09 PROCEDURE — 1157F ADVNC CARE PLAN IN RCRD: CPT | Performed by: OTOLARYNGOLOGY

## 2024-12-09 PROCEDURE — 3008F BODY MASS INDEX DOCD: CPT | Performed by: OTOLARYNGOLOGY

## 2024-12-09 PROCEDURE — 1123F ACP DISCUSS/DSCN MKR DOCD: CPT | Performed by: OTOLARYNGOLOGY

## 2024-12-09 NOTE — PROGRESS NOTES
Chief Complaint   Patient presents with    New Patient Visit     NP- AUDIO DONE 2024, HEARING LOSS/ SINUS ISSUES     HPI:  Annie Joseph is a 69 y.o. female who presents today for evaluation of her sinuses.  Gets some chronic facial pressure and some nasal congestion.  Never seems to have any significant acute bacterial sinusitis however.  She does have a known history of allergies and did do allergy immunotherapy when she lived in [place].  Also has some bilateral sensorineural hearing loss.    PMH:  Past Medical History:   Diagnosis Date    Chronic constipation ?    Chronic kidney disease     Encounter for full-term uncomplicated delivery      (spontaneous vaginal delivery)    Fibromyalgia 2022    Fibromyalgia    Heart disease     Hormone disorder     Hypertension ?    Obstructive sleep apnea (adult) (pediatric)     Obstructive sleep apnea    Other chronic pain     Chronic pain    Other conditions influencing health status     Cystocele    Other symptoms and signs involving the nervous system 2021    Suspected sleep apnea    Personal history of diseases of the blood and blood-forming organs and certain disorders involving the immune mechanism     History of anemia    Personal history of other diseases of the circulatory system 2020    History of hypertension    Personal history of other diseases of the musculoskeletal system and connective tissue 10/09/2019    History of fibromyositis    Personal history of other diseases of the musculoskeletal system and connective tissue 2020    History of arthritis    Personal history of other diseases of the musculoskeletal system and connective tissue     History of arthritis    Personal history of other diseases of the nervous system and sense organs     History of sleep disturbance    Personal history of other endocrine, nutritional and metabolic disease     History of hypothyroidism    Personal history of other  infectious and parasitic diseases     History of varicella    Rectocele     Rectocele    Renal cancer (Multi) 12/22    Spontaneous ecchymoses     Bruises easily    Urinary incontinence 2012?     Past Surgical History:   Procedure Laterality Date    DILATION AND CURETTAGE OF UTERUS  09/04/2015    Dilation And Curettage    NEPHRECTOMY  4/23    OTHER SURGICAL HISTORY  09/04/2015    Cholecystotomy    OTHER SURGICAL HISTORY  09/04/2015    Breast Surgery Puncture Aspiration Of Cyst         Medications:     Current Outpatient Medications:     amLODIPine (Norvasc) 5 mg tablet, Take 2 tablets (10 mg) by mouth once daily., Disp: , Rfl:     aspirin 81 mg EC tablet, Take 1 tablet (81 mg) by mouth once daily., Disp: , Rfl:     b complex 0.4 mg tablet, Take 1 tablet by mouth once daily., Disp: , Rfl:     calcium carbonate/vitamin D3 (CALCIUM 600 + D,3, ORAL), Take 600 mg by mouth 2 times a day., Disp: , Rfl:     cholecalciferol (Vitamin D-3) 25 MCG (1000 UT) tablet, Take by mouth., Disp: , Rfl:     clopidogrel (Plavix) 75 mg tablet, Take 1 tablet (75 mg) by mouth once daily., Disp: , Rfl:     DULoxetine (Cymbalta) 60 mg DR capsule, Take 1 capsule (60 mg) by mouth once daily., Disp: 90 capsule, Rfl: 0    famotidine (Pepcid) 40 mg tablet, Take 1 tablet (40 mg) by mouth once daily at bedtime., Disp: , Rfl:     ferrous sulfate, 325 mg ferrous sulfate, tablet, TAKE 1 TABLET BY MOUTH ONCE DAILY WITH A MEAL, Disp: 90 tablet, Rfl: 1    isosorbide mononitrate ER (Imdur) 30 mg 24 hr tablet, Take 1 tablet (30 mg) by mouth 2 times a day. Do not crush or chew., Disp: , Rfl:     magnesium glycinate 100 mg magnesium capsule, Take by mouth., Disp: , Rfl:     nitroglycerin (Nitrostat) 0.4 mg SL tablet, Place 1 tablet (0.4 mg) under the tongue if needed for chest pain., Disp: , Rfl:     omega-3 acid ethyl esters (Lovaza) 1 gram capsule, Take 1 capsule (1 g) by mouth 2 times a day., Disp: , Rfl:     pantoprazole (ProtoNix) 40 mg EC tablet, Take 1  "tablet by mouth once daily, Disp: 90 tablet, Rfl: 1    valsartan (Diovan) 160 mg tablet, Take 1 tablet (160 mg) by mouth once daily. Pt doesn't know dose., Disp: , Rfl:     fluticasone (Flonase) 50 mcg/actuation nasal spray, Administer 2 sprays into each nostril once daily. Shake gently. Before first use, prime pump. After use, clean tip and replace cap., Disp: 16 g, Rfl: 11    methocarbamol (Robaxin) 500 mg tablet, Take 1 tablet (500 mg) by mouth once daily at bedtime., Disp: 90 tablet, Rfl: 0     Allergies:  Allergies   Allergen Reactions    Atorvastatin Myalgia    Clindamycin Hives    Levaquin [Levofloxacin] Other     Nausea and vomiting.    Polyglyceryl-6 Polyricinoleate Hives    Sulfa (Sulfonamide Antibiotics) Unknown        ROS:  Review of systems normal unless stated otherwise in the HPI and/or PMH.    Physical Exam:  Height 1.575 m (5' 2\"), weight 90.7 kg (200 lb). Body mass index is 36.58 kg/m².     GENERAL APPEARANCE: Well developed and well nourished.  Alert and oriented in no acute distress.  Normal vocal quality.      HEAD/FACE: No erythema or edema or facial tenderness.  Normal facial nerve function bilaterally.    EAR:       EXTERNAL: Normal pinnas and external auditory canals without lesion or obstructing wax.       MIDDLE EAR: Tympanic membranes intact and mobile with normal landmarks.  Middle ear space appears well aerated.       TUBE STATUS: N/A       MASTOID CAVITY: N/A       HEARING: Gross hearing assessment is within normal limits.      NOSE:       VISUALIZED USING: Anterior rhinoscopy with headlight and nasal speculum.       DORSUM: Midline, nontraumatic appearance.       MUCOSA: Normal-appearing.       SECRETIONS: Normal.       SEPTUM: Midline and nonobstructing.       INFERIOR TURBINATES: Normal.       MIDDLE TURBINATES/MEATUS: N/A       BLEEDING: N/A         ORAL CAVITY/PHARYNX:       TEETH: Adequate dentition.       TONGUE: No mass or lesion.  Normal mobility.       FLOOR OF MOUTH: No mass " or lesion.       PALATE: Normal hard palate, soft palate, and uvula.       OROPHARYNX: Normal without mass or lesion.       BUCCAL MUCOSA/GBS: Normal without mass or lesion.       LIPS: Normal.    LARYNX/HYPOPHARYNX/NASOPHARYNX: N/A    NECK: No palpable masses or abnormal adenopathy.  Trachea is midline.    THYROID: No thyromegaly or palpable nodule.    SALIVARY GLANDS: Normal bilateral parotid and submandibular glands by inspection and palpation.    TMJ's: Normal.    NEURO: Cranial nerve exam grossly normal bilaterally.       Assessment/Plan   Annie was seen today for new patient visit.  Diagnoses and all orders for this visit:  Seasonal and perennial allergic rhinitis (Primary)  Nasal congestion  Bilateral sensorineural hearing loss     Educated about her sinuses.  She does not smoke.  She does have a [place] pot and Flonase at home but is not really using them.  Recommend using those on a daily basis as well as doing some avoidance measures for her known allergies.  Recheck in 3 months.  Consider imaging if not getting better.  Educate about her hearing loss.  Recommend hearing protection and hearing aids when she is ready.  Follow up in about 3 months (around 3/9/2025) for Recheck.     Flo Gaxiola MD

## 2024-12-12 ENCOUNTER — APPOINTMENT (OUTPATIENT)
Dept: PRIMARY CARE | Facility: CLINIC | Age: 69
End: 2024-12-12
Payer: MEDICARE

## 2024-12-16 ENCOUNTER — APPOINTMENT (OUTPATIENT)
Dept: PRIMARY CARE | Facility: CLINIC | Age: 69
End: 2024-12-16
Payer: MEDICARE

## 2024-12-16 VITALS
SYSTOLIC BLOOD PRESSURE: 122 MMHG | DIASTOLIC BLOOD PRESSURE: 58 MMHG | BODY MASS INDEX: 38.05 KG/M2 | HEART RATE: 67 BPM | WEIGHT: 206.8 LBS | OXYGEN SATURATION: 97 % | HEIGHT: 62 IN

## 2024-12-16 DIAGNOSIS — N32.81 OAB (OVERACTIVE BLADDER): ICD-10-CM

## 2024-12-16 DIAGNOSIS — I73.89 OTHER SPECIFIED PERIPHERAL VASCULAR DISEASES: ICD-10-CM

## 2024-12-16 DIAGNOSIS — R73.9 HYPERGLYCEMIA: ICD-10-CM

## 2024-12-16 DIAGNOSIS — E66.09 CLASS 2 OBESITY DUE TO EXCESS CALORIES WITHOUT SERIOUS COMORBIDITY WITH BODY MASS INDEX (BMI) OF 37.0 TO 37.9 IN ADULT: ICD-10-CM

## 2024-12-16 DIAGNOSIS — N18.31 STAGE 3A CHRONIC KIDNEY DISEASE (MULTI): ICD-10-CM

## 2024-12-16 DIAGNOSIS — I25.118 ATHEROSCLEROTIC HEART DISEASE OF NATIVE CORONARY ARTERY WITH OTHER FORMS OF ANGINA PECTORIS: Primary | ICD-10-CM

## 2024-12-16 DIAGNOSIS — E11.9 TYPE 2 DIABETES MELLITUS WITHOUT COMPLICATION, WITHOUT LONG-TERM CURRENT USE OF INSULIN (MULTI): ICD-10-CM

## 2024-12-16 DIAGNOSIS — E55.9 VITAMIN D DEFICIENCY: ICD-10-CM

## 2024-12-16 DIAGNOSIS — I10 HYPERTENSION, UNSPECIFIED TYPE: ICD-10-CM

## 2024-12-16 DIAGNOSIS — E66.812 CLASS 2 OBESITY DUE TO EXCESS CALORIES WITHOUT SERIOUS COMORBIDITY WITH BODY MASS INDEX (BMI) OF 37.0 TO 37.9 IN ADULT: ICD-10-CM

## 2024-12-16 PROCEDURE — 3078F DIAST BP <80 MM HG: CPT | Performed by: STUDENT IN AN ORGANIZED HEALTH CARE EDUCATION/TRAINING PROGRAM

## 2024-12-16 PROCEDURE — 4010F ACE/ARB THERAPY RXD/TAKEN: CPT | Performed by: STUDENT IN AN ORGANIZED HEALTH CARE EDUCATION/TRAINING PROGRAM

## 2024-12-16 PROCEDURE — 1160F RVW MEDS BY RX/DR IN RCRD: CPT | Performed by: STUDENT IN AN ORGANIZED HEALTH CARE EDUCATION/TRAINING PROGRAM

## 2024-12-16 PROCEDURE — 1159F MED LIST DOCD IN RCRD: CPT | Performed by: STUDENT IN AN ORGANIZED HEALTH CARE EDUCATION/TRAINING PROGRAM

## 2024-12-16 PROCEDURE — 3044F HG A1C LEVEL LT 7.0%: CPT | Performed by: STUDENT IN AN ORGANIZED HEALTH CARE EDUCATION/TRAINING PROGRAM

## 2024-12-16 PROCEDURE — 3008F BODY MASS INDEX DOCD: CPT | Performed by: STUDENT IN AN ORGANIZED HEALTH CARE EDUCATION/TRAINING PROGRAM

## 2024-12-16 PROCEDURE — 3074F SYST BP LT 130 MM HG: CPT | Performed by: STUDENT IN AN ORGANIZED HEALTH CARE EDUCATION/TRAINING PROGRAM

## 2024-12-16 PROCEDURE — 1123F ACP DISCUSS/DSCN MKR DOCD: CPT | Performed by: STUDENT IN AN ORGANIZED HEALTH CARE EDUCATION/TRAINING PROGRAM

## 2024-12-16 PROCEDURE — 99214 OFFICE O/P EST MOD 30 MIN: CPT | Performed by: STUDENT IN AN ORGANIZED HEALTH CARE EDUCATION/TRAINING PROGRAM

## 2024-12-16 PROCEDURE — G2211 COMPLEX E/M VISIT ADD ON: HCPCS | Performed by: STUDENT IN AN ORGANIZED HEALTH CARE EDUCATION/TRAINING PROGRAM

## 2024-12-16 PROCEDURE — 1157F ADVNC CARE PLAN IN RCRD: CPT | Performed by: STUDENT IN AN ORGANIZED HEALTH CARE EDUCATION/TRAINING PROGRAM

## 2024-12-16 NOTE — PROGRESS NOTES
Subjective   Patient ID: Annie Joseph is a 69 y.o. female who presents for Follow-up (Pt is here for a  6 mo follow up.).    HPI      Interval Hx:  ENT: recommendations of dilip pot and Flonase  Hearing loss     Urology: Left renal cell carcinoma.  She is status post radical nephrectomy. CT 6/24 clear of reoccurrence. Pending repeat     Pelvic floor physical therapy: recommendation to gyn for a pessary previously followed with Dr. Aubree Valdez.     Constipation: advise to keep a diary of foods that are worsening her symptoms, worse with rice.   Increased difficulty in swallowing. 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   Also noted 7 cm Hiatal hernia noted on previous EGD which can contribute to symptoms  Continue PPI   Patient remains stable no urgent need for inpatient scope.       Current Outpatient Medications:     amLODIPine (Norvasc) 5 mg tablet, Take 2 tablets (10 mg) by mouth once daily., Disp: , Rfl:     aspirin 81 mg EC tablet, Take 1 tablet (81 mg) by mouth once daily., Disp: , Rfl:     b complex 0.4 mg tablet, Take 1 tablet by mouth once daily., Disp: , Rfl:     calcium carbonate/vitamin D3 (CALCIUM 600 + D,3, ORAL), Take 600 mg by mouth 2 times a day., Disp: , Rfl:     cholecalciferol (Vitamin D-3) 25 MCG (1000 UT) tablet, Take by mouth., Disp: , Rfl:     clopidogrel (Plavix) 75 mg tablet, Take 1 tablet (75 mg) by mouth once daily., Disp: , Rfl:     DULoxetine (Cymbalta) 60 mg DR capsule, Take 1 capsule (60 mg) by mouth once daily., Disp: 90 capsule, Rfl: 0    famotidine (Pepcid) 40 mg tablet, Take 1 tablet (40 mg) by mouth once daily at bedtime., Disp: , Rfl:     ferrous sulfate, 325 mg ferrous sulfate, tablet, TAKE 1 TABLET BY MOUTH ONCE DAILY WITH A MEAL, Disp: 90 tablet, Rfl: 1    isosorbide mononitrate ER (Imdur) 30 mg 24 hr tablet, Take 1 tablet (30 mg) by mouth 2  "times a day. Do not crush or chew., Disp: , Rfl:     magnesium glycinate 100 mg magnesium capsule, Take by mouth., Disp: , Rfl:     nitroglycerin (Nitrostat) 0.4 mg SL tablet, Place 1 tablet (0.4 mg) under the tongue if needed for chest pain., Disp: , Rfl:     pantoprazole (ProtoNix) 40 mg EC tablet, Take 1 tablet by mouth once daily, Disp: 90 tablet, Rfl: 1    valsartan (Diovan) 160 mg tablet, Take 1 tablet (160 mg) by mouth once daily. Pt doesn't know dose., Disp: , Rfl:     fluticasone (Flonase) 50 mcg/actuation nasal spray, Administer 2 sprays into each nostril once daily. Shake gently. Before first use, prime pump. After use, clean tip and replace cap., Disp: 16 g, Rfl: 11    methocarbamol (Robaxin) 500 mg tablet, Take 1 tablet (500 mg) by mouth once daily at bedtime., Disp: 90 tablet, Rfl: 0    omega-3 acid ethyl esters (Lovaza) 1 gram capsule, Take 1 capsule (1 g) by mouth 2 times a day. (Patient not taking: Reported on 12/16/2024), Disp: , Rfl:     Visit Vitals  /58   Pulse 67   Ht 1.575 m (5' 2\")   Wt 93.8 kg (206 lb 12.8 oz)   SpO2 97%   BMI 37.82 kg/m²   OB Status Postmenopausal   Smoking Status Never   BSA 2.03 m²         Objective   Physical Exam  Vitals reviewed.   Constitutional:       Appearance: Normal appearance.   Cardiovascular:      Rate and Rhythm: Normal rate and regular rhythm.      Heart sounds: No murmur heard.  Pulmonary:      Effort: Pulmonary effort is normal. No respiratory distress.      Breath sounds: Normal breath sounds. No wheezing.   Musculoskeletal:      Cervical back: Neck supple.      Left lower leg: No edema.   Neurological:      Mental Status: She is alert.         Assessment/Plan   Diagnoses and all orders for this visit:  Atherosclerotic heart disease of native coronary artery with other forms of angina pectoris  Stage 3a chronic kidney disease (Multi)  Hyperglycemia  -     Hemoglobin A1C; Future  Hypertension, unspecified type  -     Comprehensive Metabolic Panel; " Future  -     Lipid Panel; Future  -     TSH with reflex to Free T4 if abnormal; Future  Vitamin D deficiency  -     Vitamin D 25-Hydroxy,Total (for eval of Vitamin D levels); Future  Class 2 obesity due to excess calories without serious comorbidity with body mass index (BMI) of 37.0 to 37.9 in adult  -     CBC and Auto Differential; Future    Annie Joseph is a 69 year old female who presents to the office for follow up.    CAD   S/p MI      Anemia   Concerns for persist bleeding post   Could be causing some of her fatigue  has been gradually worsening.  Patient most recent hemoglobin approximately 10  This is likely contributing to her chronic fatigue  Patient denies any GI bleeding.There was an ulceration at the site of her previous polypectomy.  3 clips were placed at that time.  2/20/2024     HTN  Improved  122/58mmHg  Continued amlodipine 10 mg   Continued Valsartan 160 mg daily   Follow-up with cardiology  Physical exam was stable. Discussed maintaining diets such as DASH to help maintain normal Blood pressure. Encouraged regular exercise for a total of 120 min weekly. We will fu labs in 1-3 days. For now there will be no change in medical management. All questions and concerns were addressed. Pt will follow up in 4-6 months or sooner if needed.      Obesity   Pt would like to increase activity levels   Due to chronic arthritis aquatic therapy ordered   Discussed portion sizes and activity recommendations         Renal Mass   S/p Left nephrectomy due to mass 4/23  Cr 1.3, GFR 45  Repeat today   CT 6/24 clear of reoccurrence. Pending repeat     CKD  Stage 3a  Post left nephrectomy and long standing HTN       ENT: recommendations of dilip pot and Flonase  Hearing loss     Pelvic floor physical therapy: recommendation to gyn for a pessary previously followed with Dr. Aubree Valdez.     Constipation: advise to keep a diary of foods that are worsening her symptoms, worse with rice.   Increased difficulty in  swallowing. 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   Also noted 7 cm Hiatal hernia noted on previous EGD which can contribute to symptoms  Continue PPI   Patient remains stable no urgent need for inpatient scope.   Pt struggles with compliance with her  medications     Health Maintenance   Topic Date Due    Pneumococcal Vaccine: 65+ Years (1 of 2 - PCV) 05/28/1961    Hepatitis C Screening  Never done    CKD: Urine Protein Screening  Never done    DTaP/Tdap/Td Vaccines (1 - Tdap) Never done    Zoster Vaccines (1 of 2) Never done    RSV High Risk: (Elderly (60+) or Pregnant Population) (1 - Risk 60-74 years 1-dose series) Never done    Influenza Vaccine (1) Never done    COVID-19 Vaccine (1 - 2024-25 season) Never done    Mammogram  12/04/2024    Diabetes: Hemoglobin A1C  06/13/2025    Diabetes Screening  06/13/2025    Medicare Annual Wellness Visit (AWV)  08/08/2025    Lipid Panel  10/26/2028    Colorectal Cancer Screening  01/19/2034    Bone Density Scan  Completed    HIB Vaccines  Aged Out    Hepatitis B Vaccines  Aged Out    IPV Vaccines  Aged Out    Hepatitis A Vaccines  Aged Out    Meningococcal Vaccine  Aged Out    Rotavirus Vaccines  Aged Out    HPV Vaccines  Aged Out    Welcome to Medicare Visit  Discontinued    Irritable Bowel Syndrome  Discontinued            Slime Mcdermott DO 12/16/24 9:01 AM

## 2024-12-19 DIAGNOSIS — M79.7 FIBROMYALGIA: ICD-10-CM

## 2024-12-19 RX ORDER — DULOXETIN HYDROCHLORIDE 60 MG/1
60 CAPSULE, DELAYED RELEASE ORAL DAILY
Qty: 30 CAPSULE | Refills: 0 | Status: SHIPPED | OUTPATIENT
Start: 2024-12-19

## 2025-01-10 ENCOUNTER — HOSPITAL ENCOUNTER (OUTPATIENT)
Dept: RADIOLOGY | Facility: HOSPITAL | Age: 70
Discharge: HOME | End: 2025-01-10
Payer: MEDICARE

## 2025-01-10 ENCOUNTER — LAB (OUTPATIENT)
Dept: LAB | Facility: LAB | Age: 70
End: 2025-01-10
Payer: MEDICARE

## 2025-01-10 DIAGNOSIS — C64.2 MALIGNANT NEOPLASM OF LEFT KIDNEY (MULTI): ICD-10-CM

## 2025-01-10 DIAGNOSIS — E66.812 CLASS 2 OBESITY DUE TO EXCESS CALORIES WITHOUT SERIOUS COMORBIDITY WITH BODY MASS INDEX (BMI) OF 37.0 TO 37.9 IN ADULT: ICD-10-CM

## 2025-01-10 DIAGNOSIS — E66.09 CLASS 2 OBESITY DUE TO EXCESS CALORIES WITHOUT SERIOUS COMORBIDITY WITH BODY MASS INDEX (BMI) OF 37.0 TO 37.9 IN ADULT: ICD-10-CM

## 2025-01-10 DIAGNOSIS — E55.9 VITAMIN D DEFICIENCY: ICD-10-CM

## 2025-01-10 DIAGNOSIS — I10 HYPERTENSION, UNSPECIFIED TYPE: ICD-10-CM

## 2025-01-10 DIAGNOSIS — R73.9 HYPERGLYCEMIA: ICD-10-CM

## 2025-01-10 LAB
25(OH)D3 SERPL-MCNC: 40 NG/ML (ref 30–100)
ALBUMIN SERPL BCP-MCNC: 4 G/DL (ref 3.4–5)
ALP SERPL-CCNC: 48 U/L (ref 33–136)
ALT SERPL W P-5'-P-CCNC: 11 U/L (ref 7–45)
ANION GAP SERPL CALCULATED.3IONS-SCNC: 11 MMOL/L (ref 10–20)
AST SERPL W P-5'-P-CCNC: 13 U/L (ref 9–39)
BASOPHILS # BLD AUTO: 0.07 X10*3/UL (ref 0–0.1)
BASOPHILS NFR BLD AUTO: 0.9 %
BILIRUB SERPL-MCNC: 0.7 MG/DL (ref 0–1.2)
BUN SERPL-MCNC: 24 MG/DL (ref 6–23)
CALCIUM SERPL-MCNC: 9.1 MG/DL (ref 8.6–10.3)
CHLORIDE SERPL-SCNC: 105 MMOL/L (ref 98–107)
CHOLEST SERPL-MCNC: 194 MG/DL (ref 0–199)
CHOLEST/HDLC SERPL: 3.5 {RATIO}
CO2 SERPL-SCNC: 28 MMOL/L (ref 21–32)
CREAT SERPL-MCNC: 1.32 MG/DL (ref 0.5–1.05)
EGFRCR SERPLBLD CKD-EPI 2021: 44 ML/MIN/1.73M*2
EOSINOPHIL # BLD AUTO: 0.35 X10*3/UL (ref 0–0.7)
EOSINOPHIL NFR BLD AUTO: 4.5 %
ERYTHROCYTE [DISTWIDTH] IN BLOOD BY AUTOMATED COUNT: 14.6 % (ref 11.5–14.5)
GLUCOSE SERPL-MCNC: 87 MG/DL (ref 74–99)
HCT VFR BLD AUTO: 42.4 % (ref 36–46)
HDLC SERPL-MCNC: 55.9 MG/DL
HGB BLD-MCNC: 13.8 G/DL (ref 12–16)
IMM GRANULOCYTES # BLD AUTO: 0.03 X10*3/UL (ref 0–0.7)
IMM GRANULOCYTES NFR BLD AUTO: 0.4 % (ref 0–0.9)
LDLC SERPL CALC-MCNC: 120 MG/DL
LYMPHOCYTES # BLD AUTO: 2.32 X10*3/UL (ref 1.2–4.8)
LYMPHOCYTES NFR BLD AUTO: 29.9 %
MCH RBC QN AUTO: 27.4 PG (ref 26–34)
MCHC RBC AUTO-ENTMCNC: 32.5 G/DL (ref 32–36)
MCV RBC AUTO: 84 FL (ref 80–100)
MONOCYTES # BLD AUTO: 0.76 X10*3/UL (ref 0.1–1)
MONOCYTES NFR BLD AUTO: 9.8 %
NEUTROPHILS # BLD AUTO: 4.24 X10*3/UL (ref 1.2–7.7)
NEUTROPHILS NFR BLD AUTO: 54.5 %
NON HDL CHOLESTEROL: 138 MG/DL (ref 0–149)
NRBC BLD-RTO: 0 /100 WBCS (ref 0–0)
PLATELET # BLD AUTO: 281 X10*3/UL (ref 150–450)
POTASSIUM SERPL-SCNC: 4.5 MMOL/L (ref 3.5–5.3)
PROT SERPL-MCNC: 6.4 G/DL (ref 6.4–8.2)
RBC # BLD AUTO: 5.04 X10*6/UL (ref 4–5.2)
SODIUM SERPL-SCNC: 139 MMOL/L (ref 136–145)
TRIGL SERPL-MCNC: 90 MG/DL (ref 0–149)
TSH SERPL-ACNC: 1.72 MIU/L (ref 0.44–3.98)
VLDL: 18 MG/DL (ref 0–40)
WBC # BLD AUTO: 7.8 X10*3/UL (ref 4.4–11.3)

## 2025-01-10 PROCEDURE — 71046 X-RAY EXAM CHEST 2 VIEWS: CPT

## 2025-01-10 PROCEDURE — 85025 COMPLETE CBC W/AUTO DIFF WBC: CPT

## 2025-01-10 PROCEDURE — 84443 ASSAY THYROID STIM HORMONE: CPT

## 2025-01-10 PROCEDURE — 80053 COMPREHEN METABOLIC PANEL: CPT

## 2025-01-10 PROCEDURE — 80061 LIPID PANEL: CPT

## 2025-01-10 PROCEDURE — 83036 HEMOGLOBIN GLYCOSYLATED A1C: CPT

## 2025-01-10 PROCEDURE — 82306 VITAMIN D 25 HYDROXY: CPT

## 2025-01-11 DIAGNOSIS — M79.7 FIBROMYALGIA: ICD-10-CM

## 2025-01-11 LAB
EST. AVERAGE GLUCOSE BLD GHB EST-MCNC: 128 MG/DL
HBA1C MFR BLD: 6.1 %

## 2025-01-13 RX ORDER — METHOCARBAMOL 500 MG/1
500 TABLET, FILM COATED ORAL NIGHTLY
Qty: 90 TABLET | Refills: 0 | OUTPATIENT
Start: 2025-01-13

## 2025-01-15 ENCOUNTER — HOSPITAL ENCOUNTER (OUTPATIENT)
Dept: RADIOLOGY | Facility: HOSPITAL | Age: 70
Discharge: HOME | End: 2025-01-15
Payer: MEDICARE

## 2025-01-15 DIAGNOSIS — Z12.31 SCREENING MAMMOGRAM FOR BREAST CANCER: ICD-10-CM

## 2025-01-15 PROCEDURE — 77063 BREAST TOMOSYNTHESIS BI: CPT | Performed by: RADIOLOGY

## 2025-01-15 PROCEDURE — 77067 SCR MAMMO BI INCL CAD: CPT | Performed by: RADIOLOGY

## 2025-01-15 PROCEDURE — 77067 SCR MAMMO BI INCL CAD: CPT

## 2025-01-16 ENCOUNTER — OFFICE VISIT (OUTPATIENT)
Dept: OBSTETRICS AND GYNECOLOGY | Facility: CLINIC | Age: 70
End: 2025-01-16
Payer: MEDICARE

## 2025-01-16 VITALS
WEIGHT: 202.4 LBS | SYSTOLIC BLOOD PRESSURE: 110 MMHG | HEART RATE: 73 BPM | BODY MASS INDEX: 37.02 KG/M2 | DIASTOLIC BLOOD PRESSURE: 69 MMHG

## 2025-01-16 DIAGNOSIS — R15.9 INCONTINENCE OF FECES, UNSPECIFIED FECAL INCONTINENCE TYPE: ICD-10-CM

## 2025-01-16 DIAGNOSIS — N36.8 URETHRAL PROLAPSE: ICD-10-CM

## 2025-01-16 DIAGNOSIS — N32.81 OAB (OVERACTIVE BLADDER): ICD-10-CM

## 2025-01-16 DIAGNOSIS — K59.09 CHRONIC CONSTIPATION: ICD-10-CM

## 2025-01-16 DIAGNOSIS — Z46.89 ENCOUNTER FOR FITTING AND ADJUSTMENT OF PESSARY: Primary | ICD-10-CM

## 2025-01-16 DIAGNOSIS — N39.46 MIXED INCONTINENCE URGE AND STRESS: ICD-10-CM

## 2025-01-16 PROCEDURE — 3078F DIAST BP <80 MM HG: CPT | Performed by: OBSTETRICS & GYNECOLOGY

## 2025-01-16 PROCEDURE — 4010F ACE/ARB THERAPY RXD/TAKEN: CPT | Performed by: OBSTETRICS & GYNECOLOGY

## 2025-01-16 PROCEDURE — 99215 OFFICE O/P EST HI 40 MIN: CPT | Performed by: OBSTETRICS & GYNECOLOGY

## 2025-01-16 PROCEDURE — 1159F MED LIST DOCD IN RCRD: CPT | Performed by: OBSTETRICS & GYNECOLOGY

## 2025-01-16 PROCEDURE — 3074F SYST BP LT 130 MM HG: CPT | Performed by: OBSTETRICS & GYNECOLOGY

## 2025-01-16 PROCEDURE — 3049F LDL-C 100-129 MG/DL: CPT | Performed by: OBSTETRICS & GYNECOLOGY

## 2025-01-16 PROCEDURE — 3044F HG A1C LEVEL LT 7.0%: CPT | Performed by: OBSTETRICS & GYNECOLOGY

## 2025-01-16 PROCEDURE — 1123F ACP DISCUSS/DSCN MKR DOCD: CPT | Performed by: OBSTETRICS & GYNECOLOGY

## 2025-01-16 PROCEDURE — 1157F ADVNC CARE PLAN IN RCRD: CPT | Performed by: OBSTETRICS & GYNECOLOGY

## 2025-01-16 RX ORDER — MIRABEGRON 25 MG/1
25 TABLET, FILM COATED, EXTENDED RELEASE ORAL DAILY
Qty: 30 TABLET | Refills: 2 | Status: SHIPPED | OUTPATIENT
Start: 2025-01-16

## 2025-01-16 RX ORDER — ESTRADIOL 0.1 MG/G
0.5 CREAM VAGINAL DAILY
Qty: 42.5 G | Refills: 3 | Status: SHIPPED | OUTPATIENT
Start: 2025-01-16 | End: 2026-01-16

## 2025-01-16 NOTE — PATIENT INSTRUCTIONS
We discussed lifestyle changes includin) Aiming to drink around 60 oz total of fluids per day   2) Avoiding bladder irritants such as caffeine, nicotine, artificial sweeteners   3) Stop drinking fluids 3 hours before bedtime   4) Timed voids every 2-3 hours.        Fiber Therapy:    Fiber acts in the colon (large bowel) to make the stool soft.  If the stool is too hard, the fiber draws water in and makes it softer.  If the stool is too watery, it acts like a 'sponge' and soaks up the liquid.     Many foods contain fiber. Fruits, vegetables, whole grains, and high fiber cereals all contain some fiber. Unfortunately, most of us don’t eat enough and a fiber supplement is a good way to increase soluble fiber intake.     Supplemental fiber comes in many forms. You could choose from:    1.   Powder  2.   Tablets  3.   Wafers/Cookies/Gummies    Some common brands include:    1.   Benefiber (tasteless powder)  2.   Metamucil (powder)  3.   Konsyl (powder)  4.   Citrucel (powder, may cause less gas)  5.   Fiber-Con (tablet)    All of these products work in the same way, but some may work better than others for you.    Dosin.   One tablespoon of powder dissolves in 8-16 oz of water or juice OR  2.   Two tablets with 8-16 oz water or juice OR  3.   Two fiber wafers/cookies with 8-16 oz of water or juice    Take fiber in the morning. Start off using it once per day. You can then increase frequency if needed.    IF FIBER IS NOT TAKEN WITH ADEQUATE WATER/FLUID INTAKE, IT MAY BE CONSTIPATING.  DRINK 6-8 GLASSES OF WATER/LIQUIDS THROUGHOUT THE DAY WHILE TAKING FIBER SUPPLEMENTATION.    Fiber can cause gas/bloating, especially when first starting the treatment.  If this is the case, you can take simethicone (Gas-X) over the counter after meals and at bedtime as needed.    Summary:  1.  Remember: the most common cause of constipation is inadequate water intake during the day. Avoiding dehydration is usually the key to  success with fiber supplementation.  2. Using fiber requires some experimentation- if one brand doesn’t work or causes excessive gas, take another. Also, try varying the form of fiber- for example, if the powder is unpalatable; take the tablets or wafers instead.  3. You may need more than one dose per day- feel free to increase your dose to morning and mid-day if that works better.  4. Results depend on consistency of usage- the more consistent you are in taking fiber, the better the results!

## 2025-01-16 NOTE — PROGRESS NOTES
[facility] Department of Urogynecology   Aubree Valdez MD, MPH   [phone]    ASSESSMENT AND PLAN:   69 y.o. female being assessed for THAO, obstructed defecation, and urethral prolapse.     Diagnoses:   #1 Rectal pocketing, obstructed defecation, FI  #2 THAO with urge and stress   #3 Urethral prolapse      Plan:   1. Rectal pocketing, feeling of obstructed defecation   - It is unlikely a pessary will resolve these symptoms. We tried to fit her with a !0 ring with a knobw and #1 ring with a knob and neither were comfortable  - Squatty potty and PT can help with relaxing the anus during defecation. Stool consistency should be soft and easy to pass. Add fiber PRN to optimize consistency.   - splinting is also fine    2. CHAI  - Patient wanted to try a pessary fitting. The pessary needs to be cleaned and removed q3 months in clinic or at home. Most people remove their pessary before intercourse.   - #0 and #1 ring with knob were both too small. A #2 would likely be too uncomfortable to place.   - Other options are MUS and Bulkamid. She was given information to read on these procedures.     3. UUI  - After trying PFPT and lifestyle changes, we would move on to a medication.   - Gemtesa was cost prohibitive.   - Sent Rx of Myrbetriq 25 mg; take 1 pill by mouth daily. Discussed possible side effects including slightly elevated blood pressure. Encouraged her to monitor her blood pressure at home when she starts to take this medication. If blood pressure is elevated after starting Myrbetriq, we advised the patient to discontinue taking the rx and to call the office.     4. Urethral prolapse   - Counseled patient that tv estrogen cream can treat this. Vaginal estrogen cream works locally and not systemically such as po HRT.   - She will start estrogen cream transvaginally. Nightly x2 weeks, then twice weekly at night.        Follow-up in 1 month with Dr. Valdez.     Latanya Attestation:   IElida, am  scribing for virtually, and in the presence of Aubree Valdez MD MPH on 01/16/2025 at 12:23 PM.       Problem List Items Addressed This Visit          Genitourinary and Reproductive    OAB (overactive bladder)      I spent a total of 40 minutes in face to face and non face to face time.    I Dr. Valdez, personally performed the services described in the documentation as scribed in my presence and confirm it is both complete and accurate.  Aubree Valdez MD, MPH, FACOG    Aubree Valdez MD, MPH, FACOG     Established    HISTORY OF PRESENT ILLNESS:     Annie Joseph is a 69 y.o. female who presents for follow up on prolapse.     Record Review:   - 11/14/24 Dr. Tobin note: Left renal cell carcinoma. She is status post radical nephrectomy.  Reviewed her CT scan from October. There is no evidence of any disease recurrence. Her right kidney is unremarkable. Reviewed her chest x-ray from June. This is normal as well. She will return in 6 months for follow-up.  - 6/28/22 Dr. Valdez note: Constipation - Discussed fiber and squatty potty. Stage 2 cystocele - Not very symptomatic, consider pessary. Urethral prolapse - Use vaginal estrogen cream. FI, THAO - Referred to PFPT. OAB - Sent Gemtesa rx.     Prolapse Symptoms:  - She went to PFPT for POP and she was advised to see about a pessary.   - The POP is bothersome when she strains to defecate.   - She doesn't feel a bulge coming past the introitus.      Urinary Symptoms:   - Endorses CHAI and UUI.   - Wears diapers for leakage.   - Gemtesa was cost prohibitive.   - She never used the tv estrogen cream for the urethral caruncle.     Bowel Symptoms:   - Endorses constipation. She was taking pre and probiotics, but returned to just taking prunes. Bowel movements are soft and easy to pass.   - She reports an allergy to Miralax. Sxs are hives, fevers, chills, and vomiting.   - Denies FI.   - Reports fecal urgency.   - Endorses sxs of stool trapping. She tried splinting in  the vagina unsuccessfully.     OBGYN History and Sexual Activity:   - ,  x2   - Not sexually active due to male factor.        Past Medical History:    - She reports having MI and 2 stents placed since last visit. Patient has also had kidney cancer and is now GUERITA after treatment. At one point, she was told she had an umbilical hernia. She reports having a hiatal hernia. Patient has GERD. She is anemic and has fibromyalgia.    Past Medical History:   Diagnosis Date    Breast cyst     Chronic constipation ?    Chronic kidney disease     Encounter for full-term uncomplicated delivery      (spontaneous vaginal delivery)    Fibrocystic breast 1969    Fibromyalgia 2022    Fibromyalgia    Heart disease     Hormone disorder     Hypertension ?    Obstructive sleep apnea (adult) (pediatric)     Obstructive sleep apnea    Other chronic pain     Chronic pain    Other conditions influencing health status     Cystocele    Other symptoms and signs involving the nervous system 2021    Suspected sleep apnea    Personal history of diseases of the blood and blood-forming organs and certain disorders involving the immune mechanism     History of anemia    Personal history of other diseases of the circulatory system 2020    History of hypertension    Personal history of other diseases of the musculoskeletal system and connective tissue 10/09/2019    History of fibromyositis    Personal history of other diseases of the musculoskeletal system and connective tissue 2020    History of arthritis    Personal history of other diseases of the musculoskeletal system and connective tissue     History of arthritis    Personal history of other diseases of the nervous system and sense organs     History of sleep disturbance    Personal history of other endocrine, nutritional and metabolic disease     History of hypothyroidism    Personal history of other infectious and parasitic diseases      History of varicella    Rectocele     Rectocele    Renal cancer (Multi) 12/22    Spontaneous ecchymoses     Bruises easily    Urinary incontinence 2012?          Past Surgical History:     Past Surgical History:   Procedure Laterality Date    BREAST BIOPSY  1969    DILATION AND CURETTAGE OF UTERUS  09/04/2015    Dilation And Curettage    NEPHRECTOMY  4/23    OTHER SURGICAL HISTORY  09/04/2015    Cholecystotomy    OTHER SURGICAL HISTORY  09/04/2015    Breast Surgery Puncture Aspiration Of Cyst         Medications:     Prior to Admission medications    Medication Sig Start Date End Date Taking? Authorizing Provider   amLODIPine (Norvasc) 5 mg tablet Take 2 tablets (10 mg) by mouth once daily. 10/29/21   Historical Provider, MD   aspirin 81 mg EC tablet Take 1 tablet (81 mg) by mouth once daily.    Historical Provider, MD   b complex 0.4 mg tablet Take 1 tablet by mouth once daily.    Historical Provider, MD   calcium carbonate/vitamin D3 (CALCIUM 600 + D,3, ORAL) Take 600 mg by mouth 2 times a day.    Historical Provider, MD   cholecalciferol (Vitamin D-3) 25 MCG (1000 UT) tablet Take by mouth. 10/20/20   Historical Provider, MD   clopidogrel (Plavix) 75 mg tablet Take 1 tablet (75 mg) by mouth once daily.    Historical Provider, MD   DULoxetine (Cymbalta) 60 mg DR capsule Take 1 capsule by mouth once daily 12/19/24   Pankaj Jade MD   famotidine (Pepcid) 40 mg tablet Take 1 tablet (40 mg) by mouth once daily at bedtime. 5/29/24   Historical Provider, MD   ferrous sulfate, 325 mg ferrous sulfate, tablet TAKE 1 TABLET BY MOUTH ONCE DAILY WITH A MEAL 8/13/24   Slime Mcdermott,    fluticasone (Flonase) 50 mcg/actuation nasal spray Administer 2 sprays into each nostril once daily. Shake gently. Before first use, prime pump. After use, clean tip and replace cap. 11/15/23 11/14/24  Florentin Crawley MD   isosorbide mononitrate ER (Imdur) 30 mg 24 hr tablet Take 1 tablet (30 mg) by mouth 2 times a day. Do not crush or  chew.    Historical Provider, MD   magnesium glycinate 100 mg magnesium capsule Take by mouth.    Historical Provider, MD   methocarbamol (Robaxin) 500 mg tablet Take 1 tablet (500 mg) by mouth once daily at bedtime. 9/4/24 12/3/24  Pankaj Jade MD   nitroglycerin (Nitrostat) 0.4 mg SL tablet Place 1 tablet (0.4 mg) under the tongue if needed for chest pain.    Historical Provider, MD   pantoprazole (ProtoNix) 40 mg EC tablet Take 1 tablet by mouth once daily 10/3/24   Slime Mcdermott DO   valsartan (Diovan) 160 mg tablet Take 1 tablet (160 mg) by mouth once daily. Pt doesn't know dose.    Historical Provider, MD   omega-3 acid ethyl esters (Lovaza) 1 gram capsule Take 1 capsule (1 g) by mouth 2 times a day.  Patient not taking: Reported on 12/16/2024 1/16/25  Historical Provider, MD ABDI  Review of Systems       PHYSICAL EXAM:    /69   Pulse 73   Wt 91.8 kg (202 lb 6.4 oz)   BMI 37.02 kg/m²   No LMP recorded. Patient is postmenopausal.    Declines chaperone for physical exam.      Well developed, well nourished, in no apparent distress.   Neurologic/Psychiatric:  Awake, Alert and Oriented times 3.  Affect normal.     GENITAL/URINARY:     External Genitalia:  The patient has normal appearing external genitalia, normal skenes and bartholins glands, and a normal hair distribution.  Her vulva is without lesions, erythema or discharge.  It is non-tender with appropriate sensation.     Urethral Meatus:  Size normal, Location normal, Lesions absent, Prolapse absent.    Urethra:  Fullness absent, Masses absent.    Bladder:  Fullness absent, Masses absent, Tenderness absent.    Vagina:  General appearance normal, Discharge absent, Lesions absent.     Cervix: Normal, no discharge.   Uterus:  normal size  Adnexa:   no masses palpated, exam limited by abdominal habitus    Anus/Perineum:  Lesions absent and Masses absent, concentric squeeze, good puborectalis lift, moderate amount of rectal pocketing   Normal  "Perineum    Physical Exam  Genitourinary:   POP-Q measurements were:      Aa: -1, Ba: -1, C: -7     gH: pB: TVL:      Ap: -1, Bp: -1, D: -9      Patient ID: Annie Joseph is a 69 y.o. female.    Pessary    Date/Time: 1/16/2025 11:45 AM    Performed by: Aubree Valdez MD MPH  Authorized by: Aubree Valdez MD MPH    Consent:     Consent given by:  Patient  Indication:     Indication for pessary: incontinence    Procedure:     Pessaries tried:  #0 and #1 ring with knob were both too small.  Outcomes:     Patient tolerance of procedure:  Tolerated well, no immediate complications        Data and DIAGNOSTIC STUDIES REVIEWED   XR chest 2 views    Result Date: 1/12/2025  Interpreted By:  Hayden Fagan, STUDY: XR CHEST 2 VIEWS;  1/10/2025 12:58 pm   INDICATION: Signs/Symptoms:renal mass.   ,C64.2 Malignant neoplasm of left kidney, except renal pelvis (Multi)   COMPARISON: 07/23/2024   ACCESSION NUMBER(S): [id number]   ORDERING CLINICIAN: FERNY KHAN   FINDINGS:     The cardiomediastinal silhouette and pulmonary vasculature are within normal limits. Atherosclerotic aorta.   No consolidation, pleural effusion or pneumothorax.         No acute cardiopulmonary process.     MACRO: None.   Signed by: Hayden Fagan 1/12/2025 7:45 AM Dictation workstation:   [id number]     No results found for: \"URINECULTURE\", \"UAMICCOMM\"   Lab Results   Component Value Date    GLUCOSE 87 01/10/2025    CALCIUM 9.1 01/10/2025     01/10/2025    K 4.5 01/10/2025    CO2 28 01/10/2025     01/10/2025    BUN 24 (H) 01/10/2025    CREATININE 1.32 (H) 01/10/2025     Lab Results   Component Value Date    WBC 7.8 01/10/2025    HGB 13.8 01/10/2025    HCT 42.4 01/10/2025    MCV 84 01/10/2025     01/10/2025        "

## 2025-01-22 ENCOUNTER — APPOINTMENT (OUTPATIENT)
Dept: PRIMARY CARE | Facility: CLINIC | Age: 70
End: 2025-01-22
Payer: MEDICARE

## 2025-01-30 ENCOUNTER — TELEPHONE (OUTPATIENT)
Dept: OBSTETRICS AND GYNECOLOGY | Facility: CLINIC | Age: 70
End: 2025-01-30
Payer: MEDICARE

## 2025-01-30 DIAGNOSIS — M79.7 FIBROMYALGIA: ICD-10-CM

## 2025-01-30 RX ORDER — DULOXETIN HYDROCHLORIDE 60 MG/1
60 CAPSULE, DELAYED RELEASE ORAL DAILY
Qty: 30 CAPSULE | Refills: 0 | OUTPATIENT
Start: 2025-01-30

## 2025-01-30 NOTE — TELEPHONE ENCOUNTER
mirabegron (Myrbetriq) 25 mg tablet extended release 24 hr 24 hr tablet     Medication is too expensive, is there a substitute that will be less expensive.

## 2025-01-31 ENCOUNTER — TELEPHONE (OUTPATIENT)
Dept: RHEUMATOLOGY | Facility: CLINIC | Age: 70
End: 2025-01-31
Payer: MEDICARE

## 2025-01-31 NOTE — TELEPHONE ENCOUNTER
Patient called stated doctor canceled her refill for duloxetine  and she thought she had appointment coming up. She no showed appointment in November.    Called patient left voice mail that her appointment was missed and she needs to call office and schedule appointment

## 2025-02-03 ENCOUNTER — APPOINTMENT (OUTPATIENT)
Dept: PRIMARY CARE | Facility: CLINIC | Age: 70
End: 2025-02-03
Payer: MEDICARE

## 2025-02-03 VITALS
WEIGHT: 203.4 LBS | HEART RATE: 58 BPM | BODY MASS INDEX: 37.43 KG/M2 | DIASTOLIC BLOOD PRESSURE: 78 MMHG | OXYGEN SATURATION: 97 % | HEIGHT: 62 IN | SYSTOLIC BLOOD PRESSURE: 138 MMHG

## 2025-02-03 DIAGNOSIS — R20.2 NUMBNESS AND TINGLING: ICD-10-CM

## 2025-02-03 DIAGNOSIS — M79.7 FIBROMYALGIA: ICD-10-CM

## 2025-02-03 DIAGNOSIS — R20.0 NUMBNESS AND TINGLING: ICD-10-CM

## 2025-02-03 DIAGNOSIS — E11.9 TYPE 2 DIABETES MELLITUS WITHOUT COMPLICATION, WITHOUT LONG-TERM CURRENT USE OF INSULIN (MULTI): Primary | ICD-10-CM

## 2025-02-03 PROCEDURE — 4010F ACE/ARB THERAPY RXD/TAKEN: CPT | Performed by: STUDENT IN AN ORGANIZED HEALTH CARE EDUCATION/TRAINING PROGRAM

## 2025-02-03 PROCEDURE — 3049F LDL-C 100-129 MG/DL: CPT | Performed by: STUDENT IN AN ORGANIZED HEALTH CARE EDUCATION/TRAINING PROGRAM

## 2025-02-03 PROCEDURE — 3008F BODY MASS INDEX DOCD: CPT | Performed by: STUDENT IN AN ORGANIZED HEALTH CARE EDUCATION/TRAINING PROGRAM

## 2025-02-03 PROCEDURE — 1123F ACP DISCUSS/DSCN MKR DOCD: CPT | Performed by: STUDENT IN AN ORGANIZED HEALTH CARE EDUCATION/TRAINING PROGRAM

## 2025-02-03 PROCEDURE — G2211 COMPLEX E/M VISIT ADD ON: HCPCS | Performed by: STUDENT IN AN ORGANIZED HEALTH CARE EDUCATION/TRAINING PROGRAM

## 2025-02-03 PROCEDURE — 99214 OFFICE O/P EST MOD 30 MIN: CPT | Performed by: STUDENT IN AN ORGANIZED HEALTH CARE EDUCATION/TRAINING PROGRAM

## 2025-02-03 PROCEDURE — 1157F ADVNC CARE PLAN IN RCRD: CPT | Performed by: STUDENT IN AN ORGANIZED HEALTH CARE EDUCATION/TRAINING PROGRAM

## 2025-02-03 PROCEDURE — 3044F HG A1C LEVEL LT 7.0%: CPT | Performed by: STUDENT IN AN ORGANIZED HEALTH CARE EDUCATION/TRAINING PROGRAM

## 2025-02-03 PROCEDURE — 3075F SYST BP GE 130 - 139MM HG: CPT | Performed by: STUDENT IN AN ORGANIZED HEALTH CARE EDUCATION/TRAINING PROGRAM

## 2025-02-03 PROCEDURE — 3078F DIAST BP <80 MM HG: CPT | Performed by: STUDENT IN AN ORGANIZED HEALTH CARE EDUCATION/TRAINING PROGRAM

## 2025-02-03 RX ORDER — DULOXETIN HYDROCHLORIDE 60 MG/1
60 CAPSULE, DELAYED RELEASE ORAL DAILY
Qty: 90 CAPSULE | Refills: 0 | Status: SHIPPED | OUTPATIENT
Start: 2025-02-03 | End: 2025-02-14 | Stop reason: SDUPTHER

## 2025-02-03 ASSESSMENT — PATIENT HEALTH QUESTIONNAIRE - PHQ9
1. LITTLE INTEREST OR PLEASURE IN DOING THINGS: SEVERAL DAYS
2. FEELING DOWN, DEPRESSED OR HOPELESS: SEVERAL DAYS
10. IF YOU CHECKED OFF ANY PROBLEMS, HOW DIFFICULT HAVE THESE PROBLEMS MADE IT FOR YOU TO DO YOUR WORK, TAKE CARE OF THINGS AT HOME, OR GET ALONG WITH OTHER PEOPLE: SOMEWHAT DIFFICULT
SUM OF ALL RESPONSES TO PHQ9 QUESTIONS 1 AND 2: 2

## 2025-02-03 NOTE — PROGRESS NOTES
Subjective   Patient ID: Annie Joseph is a 69 y.o. female who presents for Follow-up (Pt is here to continue her 6 MO follow up.).  HPI   Pt states that she is feeling a bit depressed.    Orthopedic   S/p knee injections   Pending surgery but will need cardiac clearance   Established with Dr. Sherman     Medications:  Stopped Plavix  Isosorbide- 30mg daily   Holding statin - continue red yeast rice    UAB  Myrbetriq is cost prohibitive   Cystocele   Pessaries     DM   A1C 6.1%  Pt is not interested in GLP-1 Agonist   Struggling with emotional eating       HTN   Amlodipine 5mg daily   Continue valsartan 160 mg daily     CAD   Changes with   discontinue DAPT   Isosorbide 30mg daily     GERD   Continue protonic 40mg daily     Fibromyalgia   Pt has been lost to follow up with her rheumatologist   Pt has been out of her duloxetine today     Hands tingling   B12 332 in 6/24      Current Outpatient Medications:     amLODIPine (Norvasc) 5 mg tablet, Take 2 tablets (10 mg) by mouth once daily., Disp: , Rfl:     aspirin 81 mg EC tablet, Take 1 tablet (81 mg) by mouth once daily., Disp: , Rfl:     calcium carbonate/vitamin D3 (CALCIUM 600 + D,3, ORAL), Take 600 mg by mouth 2 times a day., Disp: , Rfl:     cholecalciferol (Vitamin D-3) 25 MCG (1000 UT) tablet, Take by mouth., Disp: , Rfl:     DULoxetine (Cymbalta) 60 mg DR capsule, Take 1 capsule by mouth once daily, Disp: 30 capsule, Rfl: 0    estradiol (Estrace) 0.01 % (0.1 mg/gram) vaginal cream, Insert 0.125 Applicatorfuls (0.5 g) into the vagina once daily. Nightly for 2 weeks, then twice weekly., Disp: 42.5 g, Rfl: 3    famotidine (Pepcid) 40 mg tablet, Take 1 tablet (40 mg) by mouth once daily at bedtime., Disp: , Rfl:     isosorbide mononitrate ER (Imdur) 30 mg 24 hr tablet, Take 1 tablet (30 mg) by mouth 2 times a day. Do not crush or chew. (Patient taking differently: Take 1 tablet (30 mg) by mouth once daily. Do not crush or chew.), Disp: , Rfl:     magnesium  "glycinate 100 mg magnesium capsule, Take by mouth., Disp: , Rfl:     nitroglycerin (Nitrostat) 0.4 mg SL tablet, Place 1 tablet (0.4 mg) under the tongue if needed for chest pain., Disp: , Rfl:     pantoprazole (ProtoNix) 40 mg EC tablet, Take 1 tablet by mouth once daily, Disp: 90 tablet, Rfl: 1    valsartan (Diovan) 160 mg tablet, Take 1 tablet (160 mg) by mouth once daily. Pt doesn't know dose., Disp: , Rfl:     b complex 0.4 mg tablet, Take 1 tablet by mouth once daily. (Patient not taking: Reported on 2/3/2025), Disp: , Rfl:     ferrous sulfate, 325 mg ferrous sulfate, tablet, TAKE 1 TABLET BY MOUTH ONCE DAILY WITH A MEAL (Patient not taking: Reported on 2/3/2025), Disp: 90 tablet, Rfl: 1    fluticasone (Flonase) 50 mcg/actuation nasal spray, Administer 2 sprays into each nostril once daily. Shake gently. Before first use, prime pump. After use, clean tip and replace cap., Disp: 16 g, Rfl: 11    methocarbamol (Robaxin) 500 mg tablet, Take 1 tablet (500 mg) by mouth once daily at bedtime., Disp: 90 tablet, Rfl: 0    mirabegron (Myrbetriq) 25 mg tablet extended release 24 hr 24 hr tablet, Take 1 tablet (25 mg) by mouth once daily. (Patient not taking: Reported on 2/3/2025), Disp: 30 tablet, Rfl: 2    Visit Vitals  /78   Pulse 58   Ht 1.575 m (5' 2\")   Wt 92.3 kg (203 lb 6.4 oz)   SpO2 97%   BMI 37.20 kg/m²   OB Status Postmenopausal   Smoking Status Never   BSA 2.01 m²         Objective   Physical Exam  Vitals reviewed.   Constitutional:       Appearance: Normal appearance.   Cardiovascular:      Rate and Rhythm: Normal rate and regular rhythm.      Heart sounds: No murmur heard.  Pulmonary:      Effort: Pulmonary effort is normal. No respiratory distress.      Breath sounds: Normal breath sounds. No wheezing.   Musculoskeletal:      Cervical back: Neck supple.      Left lower leg: No edema.   Neurological:      Mental Status: She is alert.       Assessment/Plan   Diagnoses and all orders for this " visit:  Type 2 diabetes mellitus without complication, without long-term current use of insulin (Multi)  Fibromyalgia  -     DULoxetine (Cymbalta) 60 mg DR capsule; Take 1 capsule (60 mg) by mouth once daily.  Numbness and tingling  -     Vitamin B12; Future            Annie Joseph is a 69 year old female who presents to the office for follow up. PMHX fibromyalgia, HTN, GERD, IBS, MI and vitamin D deficiency      CAD   S/p MI      Anemia   Concerns for persist bleeding post   Could be causing some of her fatigue  has been gradually worsening.  Patient most recent hemoglobin approximately 10  This is likely contributing to her chronic fatigue  Patient denies any GI bleeding.There was an ulceration at the site of her previous polypectomy.  3 clips were placed at that time.  2/20/2024     HTN  Improved  138/78 mmHg  Continued amlodipine 10 mg   Continued Valsartan 160 mg daily   Follow-up with cardiology  Physical exam was stable. Discussed maintaining diets such as DASH to help maintain normal Blood pressure. Encouraged regular exercise for a total of 120 min weekly. We will fu labs in 1-3 days. For now there will be no change in medical management. All questions and concerns were addressed. Pt will follow up in 4-6 months or sooner if needed.      Obesity   Pt would like to increase activity levels   Due to chronic arthritis aquatic therapy ordered   Discussed portion sizes and activity recommendations         Renal Mass   S/p Left nephrectomy due to mass 4/23  Cr 1.3, GFR 45  Repeat today   CT 6/24 clear of reoccurrence. Pending repeat      CKD  Stage 3a  Post left nephrectomy and long standing HTN        ENT: recommendations of dilip pot and Flonase  Hearing loss      Pelvic floor physical therapy: recommendation to gyn for a pessary previously followed with Dr. Aubree Valdez.      Constipation: advise to keep a diary of foods that are worsening her symptoms, worse with rice.   Increased difficulty in  swallowing. 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   Also noted 7 cm Hiatal hernia noted on previous EGD which can contribute to symptoms  Continue PPI   Patient remains stable no urgent need for inpatient scope.   Pt struggles with compliance with her  medications        Slime Mcdermott DO 02/03/25 5:09 PM

## 2025-02-05 ENCOUNTER — TELEPHONE (OUTPATIENT)
Dept: OBSTETRICS AND GYNECOLOGY | Facility: CLINIC | Age: 70
End: 2025-02-05
Payer: MEDICARE

## 2025-02-05 DIAGNOSIS — N32.81 OAB (OVERACTIVE BLADDER): ICD-10-CM

## 2025-02-05 NOTE — TELEPHONE ENCOUNTER
LM that alternate rx to Myrbetriq will be sent to local pharmacy - trospium 20mg to be taken twice a day. Possible side effects are dry eye, dry mouth and constipation. Left voicemail with office phone number [phone] to call with further questions.

## 2025-02-05 NOTE — TELEPHONE ENCOUNTER
Request received for   Requested Prescriptions     Pending Prescriptions Disp Refills    trospium (Sanctura) 20 mg tablet 60 tablet 1     Sig: Take 1 tablet (20 mg) by mouth 2 times a day.       Annie Joseph was last seen 1/30/2025 and has an appt for 2/21/2025.

## 2025-02-06 RX ORDER — TROSPIUM CHLORIDE 20 MG/1
20 TABLET, FILM COATED ORAL 2 TIMES DAILY
Qty: 60 TABLET | Refills: 1 | Status: SHIPPED | OUTPATIENT
Start: 2025-02-06

## 2025-02-14 ENCOUNTER — APPOINTMENT (OUTPATIENT)
Dept: RHEUMATOLOGY | Facility: CLINIC | Age: 70
End: 2025-02-14
Payer: MEDICARE

## 2025-02-14 VITALS
DIASTOLIC BLOOD PRESSURE: 78 MMHG | HEIGHT: 62 IN | SYSTOLIC BLOOD PRESSURE: 132 MMHG | WEIGHT: 200 LBS | BODY MASS INDEX: 36.8 KG/M2 | HEART RATE: 78 BPM

## 2025-02-14 DIAGNOSIS — M79.7 FIBROMYALGIA: Primary | ICD-10-CM

## 2025-02-14 PROCEDURE — 1157F ADVNC CARE PLAN IN RCRD: CPT | Performed by: INTERNAL MEDICINE

## 2025-02-14 PROCEDURE — 99213 OFFICE O/P EST LOW 20 MIN: CPT | Performed by: INTERNAL MEDICINE

## 2025-02-14 PROCEDURE — 3049F LDL-C 100-129 MG/DL: CPT | Performed by: INTERNAL MEDICINE

## 2025-02-14 PROCEDURE — 1123F ACP DISCUSS/DSCN MKR DOCD: CPT | Performed by: INTERNAL MEDICINE

## 2025-02-14 PROCEDURE — 3075F SYST BP GE 130 - 139MM HG: CPT | Performed by: INTERNAL MEDICINE

## 2025-02-14 PROCEDURE — 4010F ACE/ARB THERAPY RXD/TAKEN: CPT | Performed by: INTERNAL MEDICINE

## 2025-02-14 PROCEDURE — 3044F HG A1C LEVEL LT 7.0%: CPT | Performed by: INTERNAL MEDICINE

## 2025-02-14 PROCEDURE — 3008F BODY MASS INDEX DOCD: CPT | Performed by: INTERNAL MEDICINE

## 2025-02-14 PROCEDURE — 3078F DIAST BP <80 MM HG: CPT | Performed by: INTERNAL MEDICINE

## 2025-02-14 RX ORDER — METHOCARBAMOL 500 MG/1
500 TABLET, FILM COATED ORAL NIGHTLY
Qty: 90 TABLET | Refills: 1 | Status: SHIPPED | OUTPATIENT
Start: 2025-02-14 | End: 2025-05-15

## 2025-02-14 RX ORDER — DULOXETIN HYDROCHLORIDE 60 MG/1
60 CAPSULE, DELAYED RELEASE ORAL DAILY
Qty: 90 CAPSULE | Refills: 1 | Status: SHIPPED | OUTPATIENT
Start: 2025-02-14

## 2025-02-14 NOTE — PROGRESS NOTES
"Subjective . Annie Joseph is a 69 y.o. female who presents for Follow-up (Follow up).    HPI. 69-year-old female with history of fibromyalgia, HTN, GERD, IBS and vitamin D deficiency presented for follow-up.     She states fibromyalgia is getting worse for the past few months.  She notes more generalized aches and pains, fatigue and tiredness.  She spends more hours for rest.  She also reports worsening pain in her knees.  She states she has bone-on-bone arthritis in the knee and is considering knee replacement surgery.      Review of Systems   All other systems reviewed and are negative.    Objective     Blood pressure 132/78, pulse 78, height 1.575 m (5' 2\"), weight 90.7 kg (200 lb).    Physical Exam.  Gen. AAO x3, NAD.  HEENT: No pallor or icterus, PERRLA, EOMI. No cervical lymphadenopathy .  Skin: No rashes.  Heart: S1, S2/ RRR.  Lungs: CTA B.  Abdomen: Soft, NT/ND, BS regular.  MSK: No.swelling or tenderness of the  upper or lower extremity joints.bilateral knee with patellofemoral crepitation.    Neuro: Sensation to touch intact.Strength 5/5 throughout.   Psych:Appropriate mood and behavior  EXT: No edema    Assessment/Plan .     #1: Fibromyalgia.  She is not doing well.  -[facility]  -Continue duloxetine and methocarbamol.  She does not want to take more antidepressants or muscle relaxant.     This note was partially generated using the Dragon Voice recognition system. There may be some incorrect wording, spelling and/or spelling errors or punctuation errors that were not corrected prior to committing the note to the medical record.    Problem List Items Addressed This Visit       Fibromyalgia - Primary    Relevant Medications    DULoxetine (Cymbalta) 60 mg DR capsule    methocarbamol (Robaxin) 500 mg tablet    Other Relevant Orders    [facility]            Active Ambulatory Problems     Diagnosis Date Noted    Abnormal glucose 02/13/2023    Arthritis " of knee, left 02/13/2023    Cystocele, midline 02/13/2023    Dysphagia 02/13/2023    Hyperglycemia 02/13/2023    Fatigue 02/13/2023    Fibromyalgia 02/13/2023    Hiatal hernia with GERD 02/13/2023    Hypertension 02/13/2023    IBS (irritable bowel syndrome) 02/13/2023    Left knee pain 02/13/2023    Low back pain 02/13/2023    Mixed incontinence urge and stress 02/13/2023    Nevus 02/13/2023    OAB (overactive bladder) 02/13/2023    ROBERT (obstructive sleep apnea) 02/13/2023    Postmenopausal bleeding 02/13/2023    Urethral prolapse 02/13/2023    Uterine prolapse 02/13/2023    Vitamin D deficiency 02/13/2023    Urinary incontinence 02/13/2023    Fecal incontinence 02/13/2023    Chronic constipation 02/13/2023    Atherosclerotic heart disease of native coronary artery with other forms of angina pectoris 02/13/2023    Hearing loss 02/13/2023    Renal mass, left 02/13/2023    Non-ST elevation myocardial infarction (NSTEMI) in recovery phase (Multi) 02/13/2023    Excessive daytime sleepiness 07/20/2023    Knee pain 07/20/2023    Suspected sleep apnea 07/20/2023    Elevated blood sugar 07/20/2023    Central sleep apnea 07/20/2023    Obesity, morbid (Multi) 11/29/2023    Stage 3a chronic kidney disease (Multi) 11/29/2023    Iron deficiency anemia 11/29/2023    Weakness of pelvic floor 08/21/2024    Other specified peripheral vascular diseases 12/16/2024    Type 2 diabetes mellitus without complication, without long-term current use of insulin (Multi) 12/16/2024     Resolved Ambulatory Problems     Diagnosis Date Noted    Chest pain 02/13/2023    Chronic sinusitis 02/13/2023    Condition not found 02/13/2023     Past Medical History:   Diagnosis Date    Breast cyst 1969    Chronic kidney disease 2023    Encounter for full-term uncomplicated delivery     Fibrocystic breast 1969    Heart disease 12/22    Hormone disorder 1990    Obstructive sleep apnea (adult) (pediatric)     Other chronic pain     Other conditions influencing  health status     Other symptoms and signs involving the nervous system 04/20/2021    Personal history of diseases of the blood and blood-forming organs and certain disorders involving the immune mechanism     Personal history of other diseases of the circulatory system 01/05/2020    Personal history of other diseases of the musculoskeletal system and connective tissue 10/09/2019    Personal history of other diseases of the musculoskeletal system and connective tissue 01/05/2020    Personal history of other diseases of the musculoskeletal system and connective tissue     Personal history of other diseases of the nervous system and sense organs     Personal history of other endocrine, nutritional and metabolic disease     Personal history of other infectious and parasitic diseases     Rectocele     Renal cancer (Multi) 12/22    Spontaneous ecchymoses        Family History   Problem Relation Name Age of Onset    Diabetes Mother Julita Trinh     Fibromyalgia Mother Julita Trinh     Other (MI) Mother Julita Trinh     Dementia Mother Julita Trinh      problems Mother Julita Trinh     Heart disease Mother Julita Trinh     Hypertension Mother Julita Trinh     Other (cerebrovascular accident) Father Bandar Trinh Sr.     Heart attack Father Bandar Trinh Sr.     Cancer Father Bandar Trinh Sr.     Heart disease Father Bandar Trinh Sr.     Hypertension Father Bandar Trinh Sr.     Kidney disease Father Bandar Trinh Sr.     Other (anxiety and depression) Sister Theresa Echevarria     Diabetes Sister Theresa Echevarria      problems Sister Theresa Echevarria     Heart disease Sister Theresa Echevarria     Other (MI) Sister Theresa Echevarria     Other (anxiety and depression) Brother Bandar Trinh Jr 14    Diabetes Brother Bandar Trinh Jr 13    Other (MI) Brother Bandar Trinh Jr 40    Heart attack Brother Bandar Trinh Jr 56    Heart disease Brother Bandar Trinh Jr 56    Breast cancer  Maternal Grandmother  50    Other (cardiac disorder) Other      Other (htn) Other      Diabetes Other aunt     Other (MI) Other GM     Other (MI [Other]) Other GF     Heart disease Paternal Grandfather Sterling Trinh     Heart disease Paternal Grandmother Mray Trinh     Hypertension Paternal Grandmother Mary Trinh     Heart disease Mother's Brother Peter Foster     Heart disease Mother's Brother Bora Foster     Heart disease Mother's Brother Sukhdev Foster     Heart disease Mother's Brother Rakesh Esparza        Past Surgical History:   Procedure Laterality Date    BREAST BIOPSY  1969    DILATION AND CURETTAGE OF UTERUS  09/04/2015    Dilation And Curettage    NEPHRECTOMY  4/23    OTHER SURGICAL HISTORY  09/04/2015    Cholecystotomy    OTHER SURGICAL HISTORY  09/04/2015    Breast Surgery Puncture Aspiration Of Cyst       Social History     Tobacco Use   Smoking Status Never   Smokeless Tobacco Never       Allergies  Polyethylene glycol 3350, Atorvastatin, Clindamycin, Levaquin [levofloxacin], Polyglyceryl-6 polyricinoleate, Pregabalin, and Sulfa (sulfonamide antibiotics)    Current Meds  Current Outpatient Medications   Medication Instructions    amLODIPine (Norvasc) 5 mg tablet 2 tablets, Daily    aspirin 81 mg EC tablet 1 tablet, Daily    calcium carbonate/vitamin D3 (CALCIUM 600 + D,3, ORAL) 600 mg, 2 times daily    cholecalciferol (Vitamin D-3) 25 MCG (1000 UT) tablet Take by mouth.    DULoxetine (CYMBALTA) 60 mg, oral, Daily    estradiol (ESTRACE) 0.5 g, vaginal, Daily, Nightly for 2 weeks, then twice weekly.    famotidine (PEPCID) 40 mg, Nightly    fluticasone (Flonase) 50 mcg/actuation nasal spray 2 sprays, Each Nostril, Daily, Shake gently. Before first use, prime pump. After use, clean tip and replace cap.    isosorbide mononitrate ER (Imdur) 30 mg 24 hr tablet 1 tablet, 2 times daily    magnesium glycinate 100 mg magnesium capsule Take by mouth.    methocarbamol (ROBAXIN) 500 mg, oral, Nightly     mirabegron (MYRBETRIQ) 25 mg, oral, Daily    nitroglycerin (Nitrostat) 0.4 mg SL tablet 1 tablet, As needed    pantoprazole (PROTONIX) 40 mg, oral, Daily    RED YEAST RICE ORAL oral    trospium (SANCTURA) 20 mg, oral, 2 times daily    valsartan (DIOVAN) 160 mg, Daily        Lab Results   Component Value Date    RF 10 02/04/2020    SEDRATE 5 02/04/2020    CRP 0.20 02/04/2020    URICACID 5.3 02/04/2020    CKTOTAL 160 01/19/2024             Pankaj Jade MD

## 2025-02-17 ENCOUNTER — OFFICE VISIT (OUTPATIENT)
Dept: DERMATOLOGY | Facility: HOSPITAL | Age: 70
End: 2025-02-17
Payer: MEDICARE

## 2025-02-17 DIAGNOSIS — L57.8 ACTINIC SKIN DAMAGE: ICD-10-CM

## 2025-02-17 DIAGNOSIS — L82.1 SEBORRHEIC KERATOSIS: ICD-10-CM

## 2025-02-17 DIAGNOSIS — D22.9 MULTIPLE BENIGN NEVI: Primary | ICD-10-CM

## 2025-02-17 DIAGNOSIS — Z12.83 SCREENING EXAM FOR SKIN CANCER: ICD-10-CM

## 2025-02-17 DIAGNOSIS — L91.8 SKIN TAG: ICD-10-CM

## 2025-02-17 DIAGNOSIS — L81.4 LENTIGO: ICD-10-CM

## 2025-02-17 PROCEDURE — 99203 OFFICE O/P NEW LOW 30 MIN: CPT | Performed by: DERMATOLOGY

## 2025-02-17 PROCEDURE — 1157F ADVNC CARE PLAN IN RCRD: CPT | Performed by: DERMATOLOGY

## 2025-02-17 PROCEDURE — 4010F ACE/ARB THERAPY RXD/TAKEN: CPT | Performed by: DERMATOLOGY

## 2025-02-17 PROCEDURE — 1036F TOBACCO NON-USER: CPT | Performed by: DERMATOLOGY

## 2025-02-17 PROCEDURE — 3049F LDL-C 100-129 MG/DL: CPT | Performed by: DERMATOLOGY

## 2025-02-17 PROCEDURE — 1159F MED LIST DOCD IN RCRD: CPT | Performed by: DERMATOLOGY

## 2025-02-17 PROCEDURE — 1123F ACP DISCUSS/DSCN MKR DOCD: CPT | Performed by: DERMATOLOGY

## 2025-02-17 PROCEDURE — 3044F HG A1C LEVEL LT 7.0%: CPT | Performed by: DERMATOLOGY

## 2025-02-17 PROCEDURE — 99213 OFFICE O/P EST LOW 20 MIN: CPT | Performed by: DERMATOLOGY

## 2025-02-17 ASSESSMENT — DERMATOLOGY PATIENT ASSESSMENT
DO YOU HAVE ANY NEW OR CHANGING LESIONS: NO
ARE YOU ON BIRTH CONTROL: NO
DO YOU HAVE IRREGULAR MENSTRUAL CYCLES: NO
ARE YOU TRYING TO GET PREGNANT: NO
DO YOU USE A TANNING BED: NO
ARE YOU AN ORGAN TRANSPLANT RECIPIENT: NO
HAVE YOU HAD OR DO YOU HAVE VASCULAR DISEASE: NO
HAVE YOU HAD OR DO YOU HAVE A STAPH INFECTION: NO

## 2025-02-17 ASSESSMENT — DERMATOLOGY QUALITY OF LIFE (QOL) ASSESSMENT
DATE THE QUALITY-OF-LIFE ASSESSMENT WAS COMPLETED: 67253
RATE HOW BOTHERED YOU ARE BY SYMPTOMS OF YOUR SKIN PROBLEM (EG, ITCHING, STINGING BURNING, HURTING OR SKIN IRRITATION): 0 - NEVER BOTHERED
RATE HOW BOTHERED YOU ARE BY EFFECTS OF YOUR SKIN PROBLEMS ON YOUR ACTIVITIES (EG, GOING OUT, ACCOMPLISHING WHAT YOU WANT, WORK ACTIVITIES OR YOUR RELATIONSHIPS WITH OTHERS): 0 - NEVER BOTHERED
RATE HOW EMOTIONALLY BOTHERED YOU ARE BY YOUR SKIN PROBLEM (FOR EXAMPLE, WORRY, EMBARRASSMENT, FRUSTRATION): 0 - NEVER BOTHERED
ARE THERE EXCLUSIONS OR EXCEPTIONS FOR THE QUALITY OF LIFE ASSESSMENT: NO

## 2025-02-17 ASSESSMENT — ITCH NUMERIC RATING SCALE: HOW SEVERE IS YOUR ITCHING?: 0

## 2025-02-17 ASSESSMENT — PATIENT GLOBAL ASSESSMENT (PGA): PATIENT GLOBAL ASSESSMENT: PATIENT GLOBAL ASSESSMENT:  1 - CLEAR

## 2025-02-17 NOTE — PROGRESS NOTES
Subjective     Annie Joseph is a 69 y.o. female who presents for the following: Skin Check. Last derm visit 9/20/21 for Full Skin Exam. Benign skin biopsy at that time.     Since last visit she had a heart attack and kidney cancer diagnosis 2022 treated with surgery and has had a hard time bouncing back from it    Review of Systems:  No other skin or systemic complaints other than what is documented elsewhere in the note.    The following portions of the chart were reviewed this encounter and updated as appropriate:         Skin Cancer History  No skin cancer on file.      Specialty Problems          Dermatology Problems    Nevus        Objective   Well appearing patient in no apparent distress; mood and affect are within normal limits.    A full examination was performed including scalp, head, eyes, ears, nose, lips, neck, chest, axillae, abdomen, back, buttocks, bilateral upper extremities, bilateral lower extremities, hands, feet, fingers, toes, fingernails, and toenails. All findings within normal limits unless otherwise noted below.    Assessment/Plan   1. Multiple benign nevi  Brown and tan macules and papules with reassuring findings on dermoscopy    -These lesions have benign, reassuring patterns on dermoscopy  -Recommend continued self observation, and to contact the office if any changes in nevi are noticed    2. Lentigo  Tan macules    -Benign appearing on exam  -Reassurance, recommend observation    3. Seborrheic keratosis  Stuck on, waxy macule(s)/papule(s)/plaque(s) with comedo-like openings and milia like cysts    -Discussed the nature of the diagnosis  -Reassurance, recommend continued observation    4. Skin tag  Flesh colored pedunculated papule(s); there is no clinically evident irritation or inflammation    -Discussed nature of the condition  -Reassurance  -Removal may be performed for an out of pocket fee given cosmetic nature of removal    5. Actinic skin damage  Background of photodamage  with hyper- and hypo-pigmented macules on the skin    6. Screening exam for skin cancer  [place]    Full body skin exam  -No lesions concerning for malignancy on the remainder the skin exam today   - The ugly duckling sign was discussed. Monitor for any skin lesions that are different in color, shape, or size than others on body  -Sun protection was discussed. Recommend SPF 30+, hats with brims, sun protective clothing, and avoiding sun exposure between 10 AM and 2 PM whenever possible  -Recommend regular skin exams or sooner if new or changing lesions       Related Procedures  Follow Up In Dermatology - Established Patient        Follow up 1-2 years Full Skin Exam

## 2025-02-21 ENCOUNTER — APPOINTMENT (OUTPATIENT)
Dept: OBSTETRICS AND GYNECOLOGY | Facility: CLINIC | Age: 70
End: 2025-02-21
Payer: MEDICARE

## 2025-03-06 ENCOUNTER — APPOINTMENT (OUTPATIENT)
Dept: RADIOLOGY | Facility: HOSPITAL | Age: 70
End: 2025-03-06
Payer: MEDICARE

## 2025-03-06 ENCOUNTER — APPOINTMENT (OUTPATIENT)
Dept: CARDIOLOGY | Facility: HOSPITAL | Age: 70
End: 2025-03-06
Payer: MEDICARE

## 2025-03-06 ENCOUNTER — HOSPITAL ENCOUNTER (EMERGENCY)
Facility: HOSPITAL | Age: 70
Discharge: HOME | End: 2025-03-07
Attending: STUDENT IN AN ORGANIZED HEALTH CARE EDUCATION/TRAINING PROGRAM
Payer: MEDICARE

## 2025-03-06 DIAGNOSIS — R07.9 CHEST PAIN, UNSPECIFIED TYPE: Primary | ICD-10-CM

## 2025-03-06 DIAGNOSIS — R11.0 NAUSEA: ICD-10-CM

## 2025-03-06 LAB
ALBUMIN SERPL BCP-MCNC: 4.6 G/DL (ref 3.4–5)
ALP SERPL-CCNC: 53 U/L (ref 33–136)
ALT SERPL W P-5'-P-CCNC: 14 U/L (ref 7–45)
ANION GAP SERPL CALCULATED.3IONS-SCNC: 13 MMOL/L (ref 10–20)
APPEARANCE UR: CLEAR
AST SERPL W P-5'-P-CCNC: 18 U/L (ref 9–39)
BACTERIA #/AREA URNS AUTO: ABNORMAL /HPF
BASOPHILS # BLD AUTO: 0.05 X10*3/UL (ref 0–0.1)
BASOPHILS NFR BLD AUTO: 0.5 %
BILIRUB SERPL-MCNC: 0.7 MG/DL (ref 0–1.2)
BILIRUB UR STRIP.AUTO-MCNC: NEGATIVE MG/DL
BUN SERPL-MCNC: 24 MG/DL (ref 6–23)
CALCIUM SERPL-MCNC: 10.3 MG/DL (ref 8.6–10.3)
CARDIAC TROPONIN I PNL SERPL HS: 7 NG/L (ref 0–13)
CARDIAC TROPONIN I PNL SERPL HS: 7 NG/L (ref 0–13)
CHLORIDE SERPL-SCNC: 102 MMOL/L (ref 98–107)
CO2 SERPL-SCNC: 27 MMOL/L (ref 21–32)
COLOR UR: ABNORMAL
CREAT SERPL-MCNC: 1.46 MG/DL (ref 0.5–1.05)
EGFRCR SERPLBLD CKD-EPI 2021: 39 ML/MIN/1.73M*2
EOSINOPHIL # BLD AUTO: 0.54 X10*3/UL (ref 0–0.7)
EOSINOPHIL NFR BLD AUTO: 5.8 %
ERYTHROCYTE [DISTWIDTH] IN BLOOD BY AUTOMATED COUNT: 14.2 % (ref 11.5–14.5)
GLUCOSE SERPL-MCNC: 87 MG/DL (ref 74–99)
GLUCOSE UR STRIP.AUTO-MCNC: NORMAL MG/DL
HCT VFR BLD AUTO: 43.3 % (ref 36–46)
HGB BLD-MCNC: 14 G/DL (ref 12–16)
IMM GRANULOCYTES # BLD AUTO: 0.01 X10*3/UL (ref 0–0.7)
IMM GRANULOCYTES NFR BLD AUTO: 0.1 % (ref 0–0.9)
KETONES UR STRIP.AUTO-MCNC: NEGATIVE MG/DL
LEUKOCYTE ESTERASE UR QL STRIP.AUTO: ABNORMAL
LIPASE SERPL-CCNC: 32 U/L (ref 9–82)
LYMPHOCYTES # BLD AUTO: 2.55 X10*3/UL (ref 1.2–4.8)
LYMPHOCYTES NFR BLD AUTO: 27.2 %
MAGNESIUM SERPL-MCNC: 1.84 MG/DL (ref 1.6–2.4)
MCH RBC QN AUTO: 27.3 PG (ref 26–34)
MCHC RBC AUTO-ENTMCNC: 32.3 G/DL (ref 32–36)
MCV RBC AUTO: 84 FL (ref 80–100)
MONOCYTES # BLD AUTO: 0.73 X10*3/UL (ref 0.1–1)
MONOCYTES NFR BLD AUTO: 7.8 %
MUCOUS THREADS #/AREA URNS AUTO: ABNORMAL /LPF
NEUTROPHILS # BLD AUTO: 5.49 X10*3/UL (ref 1.2–7.7)
NEUTROPHILS NFR BLD AUTO: 58.6 %
NITRITE UR QL STRIP.AUTO: NEGATIVE
NRBC BLD-RTO: 0 /100 WBCS (ref 0–0)
PH UR STRIP.AUTO: 6.5 [PH]
PLATELET # BLD AUTO: 299 X10*3/UL (ref 150–450)
POTASSIUM SERPL-SCNC: 3.7 MMOL/L (ref 3.5–5.3)
PROT SERPL-MCNC: 7.1 G/DL (ref 6.4–8.2)
PROT UR STRIP.AUTO-MCNC: NEGATIVE MG/DL
RBC # BLD AUTO: 5.13 X10*6/UL (ref 4–5.2)
RBC # UR STRIP.AUTO: NEGATIVE MG/DL
RBC #/AREA URNS AUTO: ABNORMAL /HPF
SODIUM SERPL-SCNC: 138 MMOL/L (ref 136–145)
SP GR UR STRIP.AUTO: 1.02
SQUAMOUS #/AREA URNS AUTO: ABNORMAL /HPF
UROBILINOGEN UR STRIP.AUTO-MCNC: NORMAL MG/DL
WBC # BLD AUTO: 9.4 X10*3/UL (ref 4.4–11.3)
WBC #/AREA URNS AUTO: ABNORMAL /HPF

## 2025-03-06 PROCEDURE — 85025 COMPLETE CBC W/AUTO DIFF WBC: CPT | Performed by: PHYSICIAN ASSISTANT

## 2025-03-06 PROCEDURE — 71045 X-RAY EXAM CHEST 1 VIEW: CPT

## 2025-03-06 PROCEDURE — 87086 URINE CULTURE/COLONY COUNT: CPT | Mod: WESLAB | Performed by: PHYSICIAN ASSISTANT

## 2025-03-06 PROCEDURE — 83735 ASSAY OF MAGNESIUM: CPT | Performed by: PHYSICIAN ASSISTANT

## 2025-03-06 PROCEDURE — 99285 EMERGENCY DEPT VISIT HI MDM: CPT | Performed by: STUDENT IN AN ORGANIZED HEALTH CARE EDUCATION/TRAINING PROGRAM

## 2025-03-06 PROCEDURE — 96375 TX/PRO/DX INJ NEW DRUG ADDON: CPT

## 2025-03-06 PROCEDURE — 96361 HYDRATE IV INFUSION ADD-ON: CPT

## 2025-03-06 PROCEDURE — 83690 ASSAY OF LIPASE: CPT | Performed by: PHYSICIAN ASSISTANT

## 2025-03-06 PROCEDURE — 81001 URINALYSIS AUTO W/SCOPE: CPT | Performed by: PHYSICIAN ASSISTANT

## 2025-03-06 PROCEDURE — 2500000001 HC RX 250 WO HCPCS SELF ADMINISTERED DRUGS (ALT 637 FOR MEDICARE OP): Performed by: PHYSICIAN ASSISTANT

## 2025-03-06 PROCEDURE — 84484 ASSAY OF TROPONIN QUANT: CPT | Performed by: PHYSICIAN ASSISTANT

## 2025-03-06 PROCEDURE — 93005 ELECTROCARDIOGRAM TRACING: CPT

## 2025-03-06 PROCEDURE — 96365 THER/PROPH/DIAG IV INF INIT: CPT

## 2025-03-06 PROCEDURE — 36415 COLL VENOUS BLD VENIPUNCTURE: CPT | Performed by: PHYSICIAN ASSISTANT

## 2025-03-06 PROCEDURE — 71045 X-RAY EXAM CHEST 1 VIEW: CPT | Mod: FOREIGN READ | Performed by: RADIOLOGY

## 2025-03-06 PROCEDURE — 80053 COMPREHEN METABOLIC PANEL: CPT | Performed by: PHYSICIAN ASSISTANT

## 2025-03-06 PROCEDURE — 2500000004 HC RX 250 GENERAL PHARMACY W/ HCPCS (ALT 636 FOR OP/ED): Performed by: PHYSICIAN ASSISTANT

## 2025-03-06 RX ORDER — FAMOTIDINE 10 MG/ML
20 INJECTION, SOLUTION INTRAVENOUS ONCE
Status: COMPLETED | OUTPATIENT
Start: 2025-03-06 | End: 2025-03-06

## 2025-03-06 RX ORDER — CEFTRIAXONE 1 G/50ML
1 INJECTION, SOLUTION INTRAVENOUS ONCE
Status: COMPLETED | OUTPATIENT
Start: 2025-03-06 | End: 2025-03-07

## 2025-03-06 RX ORDER — NAPROXEN SODIUM 220 MG/1
324 TABLET, FILM COATED ORAL ONCE
Status: COMPLETED | OUTPATIENT
Start: 2025-03-06 | End: 2025-03-06

## 2025-03-06 RX ADMIN — FAMOTIDINE 20 MG: 10 INJECTION, SOLUTION INTRAVENOUS at 22:45

## 2025-03-06 RX ADMIN — CEFTRIAXONE SODIUM 1 G: 1 INJECTION, SOLUTION INTRAVENOUS at 23:31

## 2025-03-06 RX ADMIN — ASPIRIN 324 MG: 81 TABLET, CHEWABLE ORAL at 20:50

## 2025-03-06 RX ADMIN — SODIUM CHLORIDE 1000 ML: 900 INJECTION, SOLUTION INTRAVENOUS at 22:49

## 2025-03-06 ASSESSMENT — PAIN DESCRIPTION - ORIENTATION: ORIENTATION: RIGHT

## 2025-03-06 ASSESSMENT — LIFESTYLE VARIABLES
HAVE YOU EVER FELT YOU SHOULD CUT DOWN ON YOUR DRINKING: NO
HAVE PEOPLE ANNOYED YOU BY CRITICIZING YOUR DRINKING: NO
EVER FELT BAD OR GUILTY ABOUT YOUR DRINKING: NO
EVER HAD A DRINK FIRST THING IN THE MORNING TO STEADY YOUR NERVES TO GET RID OF A HANGOVER: NO
TOTAL SCORE: 0

## 2025-03-06 ASSESSMENT — PAIN SCALES - GENERAL: PAINLEVEL_OUTOF10: 2

## 2025-03-06 ASSESSMENT — PAIN DESCRIPTION - PAIN TYPE: TYPE: ACUTE PAIN

## 2025-03-06 ASSESSMENT — PAIN DESCRIPTION - LOCATION: LOCATION: CHEST

## 2025-03-06 ASSESSMENT — PAIN - FUNCTIONAL ASSESSMENT: PAIN_FUNCTIONAL_ASSESSMENT: 0-10

## 2025-03-07 VITALS
TEMPERATURE: 97.7 F | WEIGHT: 203 LBS | HEIGHT: 62 IN | BODY MASS INDEX: 37.36 KG/M2 | HEART RATE: 80 BPM | DIASTOLIC BLOOD PRESSURE: 76 MMHG | RESPIRATION RATE: 11 BRPM | OXYGEN SATURATION: 95 % | SYSTOLIC BLOOD PRESSURE: 148 MMHG

## 2025-03-07 LAB
ATRIAL RATE: 79 BPM
HOLD SPECIMEN: NORMAL
P AXIS: 31 DEGREES
P OFFSET: 186 MS
P ONSET: 124 MS
PR INTERVAL: 166 MS
Q ONSET: 207 MS
QRS COUNT: 13 BEATS
QRS DURATION: 98 MS
QT INTERVAL: 396 MS
QTC CALCULATION(BAZETT): 454 MS
QTC FREDERICIA: 434 MS
R AXIS: 2 DEGREES
T AXIS: 47 DEGREES
T OFFSET: 405 MS
VENTRICULAR RATE: 79 BPM

## 2025-03-07 RX ORDER — ONDANSETRON 4 MG/1
4 TABLET, ORALLY DISINTEGRATING ORAL EVERY 8 HOURS PRN
Qty: 20 TABLET | Refills: 0 | Status: SHIPPED | OUTPATIENT
Start: 2025-03-07 | End: 2025-03-14

## 2025-03-07 ASSESSMENT — HEART SCORE
AGE: 65+
RISK FACTORS: >2 RISK FACTORS OR HX OF ATHEROSCLEROTIC DISEASE
HEART SCORE: 4
ECG: NORMAL
HISTORY: SLIGHTLY SUSPICIOUS
TROPONIN: LESS THAN OR EQUAL TO NORMAL LIMIT

## 2025-03-07 NOTE — ED PROVIDER NOTES
HPI   Chief Complaint   Patient presents with    Vomiting     Patient states yesterday she had some angina that went up into her jaw- she did see her cardiologist and was cleared with further testing tomorrow but today she started with nausea and vomiting and was concerned since she has a history of an MI with 2 stents 22.         HPI    The patient is a 69-year-old female presenting to the emergency department with some chest discomfort that ended up rating up into the jaw a little bit.  She does have a history of an MI with 2 previous stents 2 to 3 years ago.  Says that she is feeling a bit better now however was concerned because of her history and wanted to come in for assessment    Patient History   Past Medical History:   Diagnosis Date    Breast cyst     Chronic constipation ?    Chronic kidney disease     Encounter for full-term uncomplicated delivery      (spontaneous vaginal delivery)    Fibrocystic breast 1969    Fibromyalgia 2022    Fibromyalgia    Heart disease     Hormone disorder     Hypertension ?    Obstructive sleep apnea (adult) (pediatric)     Obstructive sleep apnea    Other chronic pain     Chronic pain    Other conditions influencing health status     Cystocele    Other symptoms and signs involving the nervous system 2021    Suspected sleep apnea    Personal history of diseases of the blood and blood-forming organs and certain disorders involving the immune mechanism     History of anemia    Personal history of other diseases of the circulatory system 2020    History of hypertension    Personal history of other diseases of the musculoskeletal system and connective tissue 10/09/2019    History of fibromyositis    Personal history of other diseases of the musculoskeletal system and connective tissue 2020    History of arthritis    Personal history of other diseases of the musculoskeletal system and connective tissue     History of  arthritis    Personal history of other diseases of the nervous system and sense organs     History of sleep disturbance    Personal history of other endocrine, nutritional and metabolic disease     History of hypothyroidism    Personal history of other infectious and parasitic diseases     History of varicella    Rectocele     Rectocele    Renal cancer (Multi) 12/22    Spontaneous ecchymoses     Bruises easily    Urinary incontinence 2012?     Past Surgical History:   Procedure Laterality Date    BREAST BIOPSY  1969    DILATION AND CURETTAGE OF UTERUS  09/04/2015    Dilation And Curettage    NEPHRECTOMY  4/23    OTHER SURGICAL HISTORY  09/04/2015    Cholecystotomy    OTHER SURGICAL HISTORY  09/04/2015    Breast Surgery Puncture Aspiration Of Cyst     Family History   Problem Relation Name Age of Onset    Diabetes Mother Julita Trinh     Fibromyalgia Mother Julita Trinh     Other (MI) Mother Julita Trinh     Dementia Mother Julita Trinh      problems Mother Julita Trinh     Heart disease Mother Julita Trinh     Hypertension Mother Julita Trinh     Other (cerebrovascular accident) Father Bandar Trinh Sr.     Heart attack Father Bandar Trinh Sr.     Cancer Father Bandar Trinh Sr.     Heart disease Father Bandar Trinh Sr.     Hypertension Father Bandar Trinh Sr.     Kidney disease Father Bandar Trinh Sr.     Other (anxiety and depression) Sister Theresa Echevarria     Diabetes Sister Theresa Echevarria      problems Sister Theresa Echevarria     Heart disease Sister Theresa Echevarria     Other (MI) Sister Theresa Echevarria     Other (anxiety and depression) Brother Bandar Trinh Jr 14    Diabetes Brother Bandar Trinh Jr 13    Other (MI) Brother Bandar Trinh Jr 40    Heart attack Brother Bandar Trinh Jr 56    Heart disease Brother Bandar Trinh Jr 56    Breast cancer Maternal Grandmother  50    Other (cardiac disorder) Other      Other (htn) Other      Diabetes Other aunt      Other (MI) Other GM     Other (MI [Other]) Other GF     Heart disease Paternal Grandfather Sterling Trinh     Heart disease Paternal Grandmother Mary Trinh     Hypertension Paternal Grandmother Mary Trinh     Heart disease Mother's Brother Peter Foster     Heart disease Mother's Brother Bora Foster     Heart disease Mother's Brother Sukhdev Foster     Heart disease Mother's Brother Rakesh Esparza      Social History     Tobacco Use    Smoking status: Never    Smokeless tobacco: Never   Vaping Use    Vaping status: Never Used   Substance Use Topics    Alcohol use: Not Currently    Drug use: Not Currently       Physical Exam   ED Triage Vitals [03/06/25 2019]   Temperature Heart Rate Respirations BP   36.5 °C (97.7 °F) 83 17 (!) 192/95      Pulse Ox Temp Source Heart Rate Source Patient Position   98 % Oral Brachial Sitting      BP Location FiO2 (%)     Left arm --       Physical Exam  PHYSICAL EXAMINATION    GENERAL APPEARANCE: Awake and alert.     VITAL SIGNS: As per the nurses' triage record.     HEENT: Normocephalic, atraumatic. Extraocular muscles are intact. Pupils equal round and reactive to light. Conjunctiva are pink. Negative scleral icterus. Mucous membranes are moist. Tongue in the midline. Pharynx was without erythema or exudates, uvula midline    NECK: Soft Nontender and supple, full gross ROM, no meningeal signs.    CHEST: Nontender to palpation. Clear to auscultation bilaterally. No rales, rhonchi, or wheezing.     HEART: S1, S2. Regular rate and rhythm. No murmurs, gallops or rubs.  Strong and equal pulses in the extremities.     ABDOMEN: Soft, nontender, nondistended, positive bowel sounds, no palpable masses.    MUSCULOSKELETAL: The calves are nontender to palpation. Full gross active range of motion. Ambulating on own with no acute difficulties     NEUROLOGICAL: Awake, alert and oriented x 3. Power intact in the upper and lower extremities. Sensation is intact to light touch in the upper  and lower extremities.     IMMUNOLOGICAL: No lymphatic streaking noted     DERM: No petechiae, rashes, or ecchymoses.    ED Course & MDM   ED Course as of 03/07/25 0035   Thu Mar 06, 2025   2025 I independently interpreted the results of EKG and it showed normal sinus rhythm at 79 bpm, normal axis, normal voltage, normal ST segment, and a slight flattening of the T waves  [NG]   Fri Mar 07, 2025   0011 Spoke to the patient regarding all findings, says that she still has a little bit of an epigastric type discomfort.  But is feeling better.  She would prefer to go home [AP]      ED Course User Index  [AP] Darnell Bryant PA-C  [NG] Emily Adam MD         Diagnoses as of 03/07/25 0035   Chest pain, unspecified type   Nausea                 No data recorded       HEART Score: 4 (03/07/25 0005 : Darnell Bryant PA-C)                         Medical Decision Making  Parts of this chart have been completed using voice recognition software. Please excuse any errors of transcription.  My thought process and reason for plan has been formulated from the time that I saw the patient until the time of disposition and is not specific to one specific moment during their visit and furthermore my MDM encompasses this entire chart and not only this text box.      HPI: Detailed above.    Exam: A medically appropriate exam performed, outlined above, given the known history and presentation.    History Limited by: Nothing    History obtained from: The patient    External/internal records reviewed: Reviewed cardiac catheterization from December 15, 2022    Social Determinants of Health considered during this visit: Lives at home    Chronic conditions impacting care: Previous CAD    Medications given during visit:  Medications   aspirin chewable tablet 324 mg (324 mg oral Given 3/6/25 2050)   famotidine PF (Pepcid) injection 20 mg (20 mg intravenous Given 3/6/25 2245)   sodium chloride 0.9 % bolus 1,000 mL (0 mL intravenous  Stopped 3/7/25 0024)   cefTRIAXone (Rocephin) 1 g in dextrose (iso) IV 50 mL (0 g intravenous Stopped 3/7/25 0024)        Diagnostic/tests  Labs Reviewed   COMPREHENSIVE METABOLIC PANEL - Abnormal       Result Value    Glucose 87      Sodium 138      Potassium 3.7      Chloride 102      Bicarbonate 27      Anion Gap 13      Urea Nitrogen 24 (*)     Creatinine 1.46 (*)     eGFR 39 (*)     Calcium 10.3      Albumin 4.6      Alkaline Phosphatase 53      Total Protein 7.1      AST 18      Bilirubin, Total 0.7      ALT 14     URINALYSIS WITH REFLEX CULTURE AND MICROSCOPIC - Abnormal    Color, Urine Light-Yellow      Appearance, Urine Clear      Specific Gravity, Urine 1.016      pH, Urine 6.5      Protein, Urine NEGATIVE      Glucose, Urine Normal      Blood, Urine NEGATIVE      Ketones, Urine NEGATIVE      Bilirubin, Urine NEGATIVE      Urobilinogen, Urine Normal      Nitrite, Urine NEGATIVE      Leukocyte Esterase, Urine 75 Nacho/uL (*)    MICROSCOPIC ONLY, URINE - Abnormal    WBC, Urine 6-10 (*)     RBC, Urine NONE      Squamous Epithelial Cells, Urine 1-9 (SPARSE)      Bacteria, Urine 1+ (*)     Mucus, Urine FEW     LIPASE - Normal    Lipase 32      Narrative:     Venipuncture immediately after or during the administration of Metamizole may lead to falsely low results. Testing should be performed immediately prior to Metamizole dosing.   MAGNESIUM - Normal    Magnesium 1.84     SERIAL TROPONIN-INITIAL - Normal    Troponin I, High Sensitivity 7      Narrative:     Less than 99th percentile of normal range cutoff-  Female and children under 18 years old <14 ng/L; Male <21 ng/L: Negative  Repeat testing should be performed if clinically indicated.     Female and children under 18 years old 14-50 ng/L; Male 21-50 ng/L:  Consistent with possible cardiac damage and possible increased clinical   risk. Serial measurements may help to assess extent of myocardial damage.     >50 ng/L: Consistent with cardiac damage, increased  clinical risk and  myocardial infarction. Serial measurements may help assess extent of   myocardial damage.      NOTE: Children less than 1 year old may have higher baseline troponin   levels and results should be interpreted in conjunction with the overall   clinical context.     NOTE: Troponin I testing is performed using a different   testing methodology at [facility] than at other   [facility]. Direct result comparisons should only   be made within the same method.   SERIAL TROPONIN, 1 HOUR - Normal    Troponin I, High Sensitivity 7      Narrative:     Less than 99th percentile of normal range cutoff-  Female and children under 18 years old <14 ng/L; Male <21 ng/L: Negative  Repeat testing should be performed if clinically indicated.     Female and children under 18 years old 14-50 ng/L; Male 21-50 ng/L:  Consistent with possible cardiac damage and possible increased clinical   risk. Serial measurements may help to assess extent of myocardial damage.     >50 ng/L: Consistent with cardiac damage, increased clinical risk and  myocardial infarction. Serial measurements may help assess extent of   myocardial damage.      NOTE: Children less than 1 year old may have higher baseline troponin   levels and results should be interpreted in conjunction with the overall   clinical context.     NOTE: Troponin I testing is performed using a different   testing methodology at [facility] than at other   [facility]. Direct result comparisons should only   be made within the same method.   URINE CULTURE   CBC WITH AUTO DIFFERENTIAL    WBC 9.4      nRBC 0.0      RBC 5.13      Hemoglobin 14.0      Hematocrit 43.3      MCV 84      MCH 27.3      MCHC 32.3      RDW 14.2      Platelets 299      Neutrophils % 58.6      Immature Granulocytes %, Automated 0.1      Lymphocytes % 27.2      Monocytes % 7.8      Eosinophils % 5.8      Basophils % 0.5      Neutrophils Absolute 5.49      Immature  Granulocytes Absolute, Automated 0.01      Lymphocytes Absolute 2.55      Monocytes Absolute 0.73      Eosinophils Absolute 0.54      Basophils Absolute 0.05     TROPONIN SERIES- (INITIAL, 1 HR)    Narrative:     The following orders were created for panel order Troponin I Series, High Sensitivity (0, 1 HR).  Procedure                               Abnormality         Status                     ---------                               -----------         ------                     Troponin I, High Sensiti...[[id number]]  Normal              Final result               Troponin, High Sensitivi...[[id number]]  Normal              Final result                 Please view results for these tests on the individual orders.   URINALYSIS WITH REFLEX CULTURE AND MICROSCOPIC    Narrative:     The following orders were created for panel order Urinalysis with Reflex Culture and Microscopic.  Procedure                               Abnormality         Status                     ---------                               -----------         ------                     Urinalysis with Reflex C...[[id number]]  Abnormal            Final result               Extra Urine Gray Tube[[id number]]                            In process                   Please view results for these tests on the individual orders.   EXTRA URINE GRAY TUBE      XR chest 1 view   Final Result   No acute abnormality.   Signed by Marvin Alicia DO           EKG: Obtained read by attending physician reviewed myself and per my interpretation no sign of STEMI criteria    Case discussed with: ed attending    Prescription medications considered: Zofran    Considerations/further MDM:  Considered acute coronary syndrome including MI, intracranial hemorrhage, cardiac dysrhythmia, hypertension, tamponade, pneumothorax, hemothorax, pulmonary embolism, sepsis, intracranial hemorrhage, dissection, pneumonia, respiratory distress or compromise, pericardial effusion,    Heart  score-4    Recommended hospitalization.  Patient refused.    Has a stress test already scheduled for tomorrow at noon.    The patient feels better, wants to go home.    Shared decision making extensively discussed.  She understands the risks and has excepted them.  Return precautions between now and then have been discussed as well.    Patient has been seen in conjunction with attending physician Dr. Leyva      Procedure  Procedures     Darnell Bryant PA-C  03/07/25 0036

## 2025-03-07 NOTE — DISCHARGE INSTRUCTIONS
Be sure to follow up as directed in 1-2 days.  All of the details of your follow up instructions are detailed in the follow up section of this packet.     You have been recommended and offered hospitalization but elected to go home, you understand the potential risks of this, you also indicated that you have a stress test scheduled for tomorrow.  Make sure you keep this already arranged test is very important.    It is important to remember that your care does not end here and you must continue to monitor your condition closely. Please return to the emergency department for any worsening or concerning signs or symptoms as directed by our conversations and the discharge instructions. Otherwise please follow up with your doctor in 2 days if no better or worse. If you do not have a doctor please contact the referral number on your discharge instructions. Please contact any physician specialists provided in your discharge notes as it is very important to follow up with them regarding your condition. If you are unable to reach the physicians provided, please come back to the Emergency Department at any time.        Return to emergency room without delay for ANY new or worsening pains or for any other symptoms or concerns.

## 2025-03-08 LAB — BACTERIA UR CULT: NORMAL

## 2025-04-01 DIAGNOSIS — K21.9 HIATAL HERNIA WITH GERD: ICD-10-CM

## 2025-04-01 DIAGNOSIS — K44.9 HIATAL HERNIA WITH GERD: ICD-10-CM

## 2025-04-01 RX ORDER — PANTOPRAZOLE SODIUM 40 MG/1
40 TABLET, DELAYED RELEASE ORAL DAILY
Qty: 90 TABLET | Refills: 1 | Status: SHIPPED | OUTPATIENT
Start: 2025-04-01

## 2025-04-04 ENCOUNTER — APPOINTMENT (OUTPATIENT)
Dept: OBSTETRICS AND GYNECOLOGY | Facility: CLINIC | Age: 70
End: 2025-04-04
Payer: MEDICARE

## 2025-04-28 ENCOUNTER — TELEPHONE (OUTPATIENT)
Dept: PRIMARY CARE | Facility: CLINIC | Age: 70
End: 2025-04-28
Payer: MEDICARE

## 2025-04-28 DIAGNOSIS — M79.7 FIBROMYALGIA: Primary | ICD-10-CM

## 2025-04-30 DIAGNOSIS — N32.81 OAB (OVERACTIVE BLADDER): ICD-10-CM

## 2025-04-30 RX ORDER — TROSPIUM CHLORIDE 20 MG/1
20 TABLET, FILM COATED ORAL 2 TIMES DAILY
Qty: 60 TABLET | Refills: 0 | Status: SHIPPED | OUTPATIENT
Start: 2025-04-30

## 2025-04-30 NOTE — TELEPHONE ENCOUNTER
Request received for   Requested Prescriptions     Pending Prescriptions Disp Refills    trospium (Sanctura) 20 mg tablet [Pharmacy Med Name: Trospium Chloride 20 MG Oral Tablet] 60 tablet 0     Sig: Take 1 tablet by mouth twice daily       Annie Joseph was last seen 1/16/2025.

## 2025-05-14 ENCOUNTER — APPOINTMENT (OUTPATIENT)
Dept: OBSTETRICS AND GYNECOLOGY | Facility: CLINIC | Age: 70
End: 2025-05-14
Payer: MEDICARE

## 2025-05-14 VITALS
WEIGHT: 207.4 LBS | DIASTOLIC BLOOD PRESSURE: 74 MMHG | HEIGHT: 62 IN | SYSTOLIC BLOOD PRESSURE: 135 MMHG | BODY MASS INDEX: 38.16 KG/M2 | HEART RATE: 78 BPM

## 2025-05-14 DIAGNOSIS — N95.2 VAGINAL ATROPHY: ICD-10-CM

## 2025-05-14 DIAGNOSIS — N81.6 RECTOCELE: ICD-10-CM

## 2025-05-14 DIAGNOSIS — N32.81 OAB (OVERACTIVE BLADDER): Primary | ICD-10-CM

## 2025-05-14 DIAGNOSIS — N36.8 URETHRAL PROLAPSE: ICD-10-CM

## 2025-05-14 PROCEDURE — 99214 OFFICE O/P EST MOD 30 MIN: CPT | Performed by: OBSTETRICS & GYNECOLOGY

## 2025-05-14 PROCEDURE — 1126F AMNT PAIN NOTED NONE PRSNT: CPT | Performed by: OBSTETRICS & GYNECOLOGY

## 2025-05-14 PROCEDURE — 3008F BODY MASS INDEX DOCD: CPT | Performed by: OBSTETRICS & GYNECOLOGY

## 2025-05-14 PROCEDURE — 1159F MED LIST DOCD IN RCRD: CPT | Performed by: OBSTETRICS & GYNECOLOGY

## 2025-05-14 PROCEDURE — 3044F HG A1C LEVEL LT 7.0%: CPT | Performed by: OBSTETRICS & GYNECOLOGY

## 2025-05-14 PROCEDURE — 3075F SYST BP GE 130 - 139MM HG: CPT | Performed by: OBSTETRICS & GYNECOLOGY

## 2025-05-14 PROCEDURE — G2211 COMPLEX E/M VISIT ADD ON: HCPCS | Performed by: OBSTETRICS & GYNECOLOGY

## 2025-05-14 PROCEDURE — 3049F LDL-C 100-129 MG/DL: CPT | Performed by: OBSTETRICS & GYNECOLOGY

## 2025-05-14 PROCEDURE — 3078F DIAST BP <80 MM HG: CPT | Performed by: OBSTETRICS & GYNECOLOGY

## 2025-05-14 PROCEDURE — 4010F ACE/ARB THERAPY RXD/TAKEN: CPT | Performed by: OBSTETRICS & GYNECOLOGY

## 2025-05-14 RX ORDER — ESTRADIOL 0.1 MG/G
0.5 CREAM VAGINAL DAILY
Qty: 42.5 G | Refills: 3 | Status: SHIPPED | OUTPATIENT
Start: 2025-05-14 | End: 2026-05-14

## 2025-05-14 ASSESSMENT — PAIN SCALES - GENERAL: PAINLEVEL_OUTOF10: 0-NO PAIN

## 2025-05-14 NOTE — PROGRESS NOTES
[facility] Department of Urogynecology   Aubree Valdez MD, MPH   [phone]    ASSESSMENT AND PLAN:   69 y.o. female with THAO, rectal pocketing, and urethral prolapse. Hx of MI & 2 stents, hx of kidney cancer currently GUERITA, anemia, fibromyalgia, GERD.     Diagnoses:   #1 THAO with urge > stress   #2 Urethral prolapse   #3 Rectal pocketing   #4 vaginal atrophy    Plan:   1. OAB   - She is on Trospium 20 mg BID, but experiences dry mouth. We advised her to purchase OTC Biotene products for dry mouth before she can meet with clinical pharmacy.   - Referred to  clinical pharmacy in hopes of her being able to start Gemtesa 75 mg po once daily and stop the Trospium.     2. CHAI  - We will defer treatment of CHAI until we better control the urgency symptoms.     3. Urethral prolapse   - Sent rx for tv estrogen cream since she lost her Estradiol. Nightly x2 weeks, then twice weekly at night.       4. Rectal pocketing  - It is fine to continue splint.   - If patient wanted surgery, we would plan for a NE. It would be important to avoid constipation life long as she could have a POP recurrence if she constantly pushed/strained.     Follow-up in 3 months with Dr. Valdez.     Scribe Attestation:   I, Elida Jc, am scribing for virtually, and in the presence of Aubree Valdez MD MPH on 05/14/2025 at 3:40 PM.     Problem List Items Addressed This Visit          Genitourinary and Reproductive    OAB (overactive bladder) - Primary    Relevant Orders    Referral to Clinical Pharmacy    Urethral prolapse    Relevant Medications    estradiol (Estrace) 0.01 % (0.1 mg/gram) vaginal cream      I spent a total of 30 minutes in face to face and non face to face time.   I Dr. Valdez, personally performed the services described in the documentation as scribed in my presence and confirm it is both complete and accurate.  Aubree Valdez MD, MPH, FACOG       Aubree Valdez MD, MPH, FACOG     Established    HISTORY OF  PRESENT ILLNESS:     Annie Joseph is a 69 y.o. female who presents for follow up on THAO.     Record Review:   - 25 Dr. Valdez note: Rectal pocketing, obstructed defecation - Discussed pessary vs squatty potty vs splinting. CHAI - #0 and #1 ring with knob were both too small. A #2 would likely be too uncomfortable to place. She was given info on MUS and Bulk[facility]. UUI - Gemtesa was cost prohibitive. Sent Rx of Myrbetriq 25 mg. Urethral prolapse - Start E2.   - 24 Dr. Tobin note: Left renal cell carcinoma. She is status post radical nephrectomy.  Reviewed her CT scan from October. There is no evidence of any disease recurrence. Her right kidney is unremarkable. Reviewed her chest x-ray from . This is normal as well. She will return in 6 months for follow-up.  - 22 Dr. Valdez note: Constipation - Discussed fiber and squatty potty. Stage 2 cystocele - Not very symptomatic, consider pessary. Urethral prolapse - Use vaginal estrogen cream. FI, THAO - Referred to PFPT. OAB - Sent Gemtesa rx.      Urinary Symptoms:   - She is taking Trospium, but has dry mouth. Her tongue sticks to her mouth, which was very bothersome when she spoke at a  service.   - Patient has Biotene products for dry mouth, but doesn't always think to use it.   - Myrbetriq was $400 for patient. Gemtesa was also cost prohibitive.   - The Trospium has been very helpful for OAB.   - She may have CHAI with a full bladder.   - The UUI is significantly improved on Trospium. She might leak with UUI if she goes 4-5 hours without voiding.   - She is considering going back to regular underwear since she leaks less urine.     Vaginal Symptoms:   - Patient lost her Estradiol cream and hasn't been using it since end of March.   - She does endorse vaginal dryness.  - Patient mostly doesn't notice her prolapse. If she is really straining with a BM, she may feel a bulge.    - Constipation has improved, but sometimes she has to manipulate  with a finger to help empty the rectal pocket.        Past Medical History:     - Hx of MI and 2 stents placed.   Medical History[1]       Past Surgical History:     Surgical History[2]      Medications:     Prior to Admission medications    Medication Sig Start Date End Date Taking? Authorizing Provider   amLODIPine (Norvasc) 5 mg tablet Take 2 tablets (10 mg) by mouth once daily. 10/29/21  Yes Historical Provider, MD   aspirin 81 mg EC tablet Take 1 tablet (81 mg) by mouth once daily.   Yes Historical Provider, MD   calcium carbonate/vitamin D3 (CALCIUM 600 + D,3, ORAL) Take 600 mg by mouth 2 times a day.   Yes Historical Provider, MD   cholecalciferol (Vitamin D-3) 25 MCG (1000 UT) tablet Take by mouth. 10/20/20  Yes Historical Provider, MD   DULoxetine (Cymbalta) 60 mg DR capsule Take 1 capsule (60 mg) by mouth once daily. 2/14/25  Yes Pankaj Jade MD   famotidine (Pepcid) 40 mg tablet Take 1 tablet (40 mg) by mouth once daily at bedtime. 5/29/24  Yes Historical Provider, MD   isosorbide mononitrate ER (Imdur) 30 mg 24 hr tablet Take 1 tablet (30 mg) by mouth 2 times a day. Do not crush or chew.  Patient taking differently: Take 1 tablet (30 mg) by mouth once daily. Do not crush or chew.   Yes Historical Provider, MD   magnesium glycinate 100 mg magnesium capsule Take by mouth.   Yes Historical Provider, MD   methocarbamol (Robaxin) 500 mg tablet Take 1 tablet (500 mg) by mouth once daily at bedtime. 2/14/25 5/15/25 Yes Pankaj Jade MD   mirabegron (Myrbetriq) 25 mg tablet extended release 24 hr 24 hr tablet Take 1 tablet (25 mg) by mouth once daily. 1/16/25  Yes Aubree Valdez MD MPH   nitroglycerin (Nitrostat) 0.4 mg SL tablet Place 1 tablet (0.4 mg) under the tongue if needed for chest pain.   Yes Historical Provider, MD   pantoprazole (ProtoNix) 40 mg EC tablet Take 1 tablet by mouth once daily 4/1/25  Yes Slime Mcdermott,    RED YEAST RICE ORAL Take by mouth.   Yes Historical Provider, MD   trospium  "(Sanctura) 20 mg tablet Take 1 tablet by mouth twice daily 4/30/25  Yes Aubree Valdez MD MPH   valsartan (Diovan) 160 mg tablet Take 1 tablet (160 mg) by mouth once daily. Pt doesn't know dose.   Yes Historical Provider, MD   estradiol (Estrace) 0.01 % (0.1 mg/gram) vaginal cream Insert 0.125 Applicatorfuls (0.5 g) into the vagina once daily. Nightly for 2 weeks, then twice weekly. 1/16/25 5/14/25 Yes Aubree Valdez MD MPH   estradiol (Estrace) 0.01 % (0.1 mg/gram) vaginal cream Insert 0.125 Applicatorfuls (0.5 g) into the vagina once daily. Nightly for 2 weeks, then twice weekly. 5/14/25 5/14/26  Aubree Valdez MD MPH   fluticasone (Flonase) 50 mcg/actuation nasal spray Administer 2 sprays into each nostril once daily. Shake gently. Before first use, prime pump. After use, clean tip and replace cap. 11/15/23 11/14/24  Florentin Crawley MD        [facility]  Review of Systems       PHYSICAL EXAM:    /74 (Patient Position: Sitting)   Pulse 78   Ht 1.575 m (5' 2\")   Wt 94.1 kg (207 lb 6.4 oz)   BMI 37.93 kg/m²   No LMP recorded. Patient is postmenopausal.    Declines chaperone for physical exam.      Well developed, well nourished, in no apparent distress.   Neurologic/Psychiatric:  Awake, Alert and Oriented times 3.  Affect normal.      Data and DIAGNOSTIC STUDIES REVIEWED   Imaging  No results found.    Cardiology, Vascular, and Other Imaging  No other imaging results found for the past 7 days     Lab Results   Component Value Date    URINECULTURE  03/06/2025     Growth indicates contamination with periurethral diana. Repeat culture if clinically indicated.      Lab Results   Component Value Date    GLUCOSE 87 03/06/2025    CALCIUM 10.3 03/06/2025     03/06/2025    K 3.7 03/06/2025    CO2 27 03/06/2025     03/06/2025    BUN 24 (H) 03/06/2025    CREATININE 1.46 (H) 03/06/2025     Lab Results   Component Value Date    WBC 9.4 03/06/2025    HGB 14.0 03/06/2025    HCT 43.3 03/06/2025    MCV 84 " 2025     2025             [1]   Past Medical History:  Diagnosis Date    Breast cyst 1969    Chronic constipation ?    Chronic kidney disease     Encounter for full-term uncomplicated delivery      (spontaneous vaginal delivery)    Fibrocystic breast 1969    Fibromyalgia 2022    Fibromyalgia    Heart disease     Hormone disorder     Hypertension ?    Obstructive sleep apnea (adult) (pediatric)     Obstructive sleep apnea    Other chronic pain     Chronic pain    Other conditions influencing health status     Cystocele    Other symptoms and signs involving the nervous system 2021    Suspected sleep apnea    Personal history of diseases of the blood and blood-forming organs and certain disorders involving the immune mechanism     History of anemia    Personal history of other diseases of the circulatory system 2020    History of hypertension    Personal history of other diseases of the musculoskeletal system and connective tissue 10/09/2019    History of fibromyositis    Personal history of other diseases of the musculoskeletal system and connective tissue 2020    History of arthritis    Personal history of other diseases of the musculoskeletal system and connective tissue     History of arthritis    Personal history of other diseases of the nervous system and sense organs     History of sleep disturbance    Personal history of other endocrine, nutritional and metabolic disease     History of hypothyroidism    Personal history of other infectious and parasitic diseases     History of varicella    Rectocele     Rectocele    Renal cancer (Multi)     Spontaneous ecchymoses     Bruises easily    Urinary incontinence ?   [2]   Past Surgical History:  Procedure Laterality Date    BREAST BIOPSY  1969    DILATION AND CURETTAGE OF UTERUS  2015    Dilation And Curettage    NEPHRECTOMY      OTHER SURGICAL HISTORY  2015    Cholecystotomy     OTHER SURGICAL HISTORY  09/04/2015    Breast Surgery Puncture Aspiration Of Cyst

## 2025-05-19 ENCOUNTER — OFFICE VISIT (OUTPATIENT)
Dept: PAIN MEDICINE | Facility: CLINIC | Age: 70
End: 2025-05-19
Payer: MEDICARE

## 2025-05-19 VITALS
RESPIRATION RATE: 24 BRPM | BODY MASS INDEX: 36.44 KG/M2 | SYSTOLIC BLOOD PRESSURE: 120 MMHG | WEIGHT: 198 LBS | HEART RATE: 73 BPM | OXYGEN SATURATION: 96 % | DIASTOLIC BLOOD PRESSURE: 70 MMHG | HEIGHT: 62 IN

## 2025-05-19 DIAGNOSIS — M25.561 BILATERAL CHRONIC KNEE PAIN: ICD-10-CM

## 2025-05-19 DIAGNOSIS — M25.562 BILATERAL CHRONIC KNEE PAIN: ICD-10-CM

## 2025-05-19 DIAGNOSIS — G89.29 BILATERAL CHRONIC KNEE PAIN: ICD-10-CM

## 2025-05-19 DIAGNOSIS — M79.7 FIBROMYALGIA: ICD-10-CM

## 2025-05-19 DIAGNOSIS — M25.50 DIFFUSE ARTHRALGIA: ICD-10-CM

## 2025-05-19 DIAGNOSIS — M54.16 LUMBAR RADICULOPATHY: Primary | ICD-10-CM

## 2025-05-19 DIAGNOSIS — E11.9 DIET-CONTROLLED TYPE 2 DIABETES MELLITUS: ICD-10-CM

## 2025-05-19 DIAGNOSIS — M47.814 THORACIC SPONDYLOSIS WITHOUT MYELOPATHY: ICD-10-CM

## 2025-05-19 DIAGNOSIS — M47.812 CERVICAL SPONDYLOSIS WITHOUT MYELOPATHY: ICD-10-CM

## 2025-05-19 PROCEDURE — 4010F ACE/ARB THERAPY RXD/TAKEN: CPT | Performed by: ANESTHESIOLOGY

## 2025-05-19 PROCEDURE — 1036F TOBACCO NON-USER: CPT | Performed by: ANESTHESIOLOGY

## 2025-05-19 PROCEDURE — 3078F DIAST BP <80 MM HG: CPT | Performed by: ANESTHESIOLOGY

## 2025-05-19 PROCEDURE — 3008F BODY MASS INDEX DOCD: CPT | Performed by: ANESTHESIOLOGY

## 2025-05-19 PROCEDURE — 3049F LDL-C 100-129 MG/DL: CPT | Performed by: ANESTHESIOLOGY

## 2025-05-19 PROCEDURE — 1159F MED LIST DOCD IN RCRD: CPT | Performed by: ANESTHESIOLOGY

## 2025-05-19 PROCEDURE — 99214 OFFICE O/P EST MOD 30 MIN: CPT | Performed by: ANESTHESIOLOGY

## 2025-05-19 PROCEDURE — 3044F HG A1C LEVEL LT 7.0%: CPT | Performed by: ANESTHESIOLOGY

## 2025-05-19 PROCEDURE — G2211 COMPLEX E/M VISIT ADD ON: HCPCS | Performed by: ANESTHESIOLOGY

## 2025-05-19 PROCEDURE — 99204 OFFICE O/P NEW MOD 45 MIN: CPT | Performed by: ANESTHESIOLOGY

## 2025-05-19 PROCEDURE — 3074F SYST BP LT 130 MM HG: CPT | Performed by: ANESTHESIOLOGY

## 2025-05-19 ASSESSMENT — PATIENT HEALTH QUESTIONNAIRE - PHQ9
1. LITTLE INTEREST OR PLEASURE IN DOING THINGS: NOT AT ALL
SUM OF ALL RESPONSES TO PHQ9 QUESTIONS 1 & 2: 0
2. FEELING DOWN, DEPRESSED OR HOPELESS: NOT AT ALL

## 2025-05-19 ASSESSMENT — ENCOUNTER SYMPTOMS
ARTHRALGIAS: 1
NECK PAIN: 1
ACTIVITY CHANGE: 1
NECK STIFFNESS: 1
BACK PAIN: 1

## 2025-05-19 ASSESSMENT — PAIN DESCRIPTION - DESCRIPTORS: DESCRIPTORS: ACHING;BURNING;TINGLING

## 2025-05-19 ASSESSMENT — PAIN SCALES - GENERAL
PAINLEVEL_OUTOF10: 5
PAINLEVEL_OUTOF10: 5 - MODERATE PAIN

## 2025-05-19 ASSESSMENT — PAIN - FUNCTIONAL ASSESSMENT: PAIN_FUNCTIONAL_ASSESSMENT: 0-10

## 2025-05-19 NOTE — PROGRESS NOTES
The patient is a 69-year-old female with spinal pain as well as bilateral knee pain and diffuse muscular pain.  The patient reports pain in the cervical thoracic and lumbar spine.  The low back pain is the predominant issue.  The pain radiates into her left leg.  The pain radiates down the posterior lateral aspect of the leg to the knee.  She endorses weakness especially when the pain is more severe.  The pain is worse when she is standing and walking.  She gets some relief with rest.  She gets some relief when she leans forward on something such as a grocery cart or countertop.  She has participated in physical therapy.  She has not had any interventions for her low back pain.  She has not had any imaging of her lumbar spine.  She also describes bilateral knee pain.  The patient has had intra-articular knee injections as well as hyaluronidase injections.  The patient reports that these measures were largely ineffective.  The patient has been told that she is in need of total knee arthroplasty.  The patient would like to avoid surgery at this time given her stent placement and her nephrectomy for kidney cancer.  The patient also describes diffuse muscular pain.  She has been diagnosed with fibromyalgia.  She has participated in physical therapy and would like to do so again.  She reports that she would like to see a new rheumatologist given her diffuse muscle and joint pain.    Review of Systems   Constitutional:  Positive for activity change.   Musculoskeletal:  Positive for arthralgias, back pain, gait problem, neck pain and neck stiffness.   All other systems reviewed and are negative.    GENERAL: alert and appropriate, in no distress, well-hydrated, well nourished, interactive         SKIN: no rash noted         HEAD: normocephalic, no abnormality or lesion noted         EYES: no injection and visual acuity is grossly normal         EARS: external ears normal, no mastoid tenderness         NOSE: external nose  normal without rhinorrhea         OROPHARYNX: moist mucus membranes, no tonsillar hypertrophy/exudate, uvula midline and pharynx non-erythematous, lips, teeth and gums are without obvious lesion         NECK: Reduced ROM, no cervical LNs noted         RESPIRATORY: breathing non-labored and no grunting/flaring/retractions         CHEST: equal chest rise with normal respiratory effort         ABDOMEN: soft and non-tender         BACK: back normal in appearance, cervical and lumbar spine with reduced ROM         EXTREMITIES: dorsiflexor weakness on the left rated at 3 out of 5         NEUROLOGIC: gait antalgic, SLR positive, Refugio sign negative, Spurling sign did not reproduce pain, decreased sensation distally in the L5 on the left    Assessment and Plan    -Chronicity--chronic musculoskeletal pain    -Diagnostics--I ordered plain films of the cervical, thoracic and lumbosacral spine.  I ordered plain films of her knees.  Given her pattern of pain and the neurologic deficits, I would like the patient to have a lumbar spine MRI.  Depending upon the results, we will consider a referral to one of our excellent spine surgeons.    -Pharmacologic--no change    -Psychologic--no need for psychologic intervention from my standpoint.  There are no mental health issues of which I am aware that are contributing to the patient's pain.  There are no substance abuse or alcohol abuse issues of which I am aware that are contributing to the patient's pain.    -Physical--we discussed the importance of physical therapy and exercise.  We discussed avoidance and modification techniques.    -Intervention--no interventions planned but we did discuss temporary peripheral nerve stimulation of the saphenous nerve to address her chronic knee pain.  The left side would be addressed first given the pain is more severe on the left side.  It    I spent time educating the patient on the condition including the treatment and the prognosis.  I  invited the patient to call at anytime with any questions.

## 2025-05-21 ENCOUNTER — APPOINTMENT (OUTPATIENT)
Dept: UROLOGY | Facility: CLINIC | Age: 70
End: 2025-05-21
Payer: MEDICARE

## 2025-05-21 DIAGNOSIS — E66.01 SEVERE OBESITY (MULTI): ICD-10-CM

## 2025-05-21 DIAGNOSIS — C64.2 MALIGNANT NEOPLASM OF LEFT KIDNEY (MULTI): Primary | ICD-10-CM

## 2025-05-21 DIAGNOSIS — N18.31 CHRONIC KIDNEY DISEASE, STAGE 3A (MULTI): ICD-10-CM

## 2025-05-21 DIAGNOSIS — E11.9 TYPE 2 DIABETES MELLITUS WITHOUT COMPLICATION, WITHOUT LONG-TERM CURRENT USE OF INSULIN: ICD-10-CM

## 2025-05-21 PROCEDURE — 99213 OFFICE O/P EST LOW 20 MIN: CPT | Performed by: SURGERY

## 2025-05-21 PROCEDURE — 3049F LDL-C 100-129 MG/DL: CPT | Performed by: SURGERY

## 2025-05-21 PROCEDURE — 1159F MED LIST DOCD IN RCRD: CPT | Performed by: SURGERY

## 2025-05-21 PROCEDURE — 1126F AMNT PAIN NOTED NONE PRSNT: CPT | Performed by: SURGERY

## 2025-05-21 PROCEDURE — 1160F RVW MEDS BY RX/DR IN RCRD: CPT | Performed by: SURGERY

## 2025-05-21 PROCEDURE — 1036F TOBACCO NON-USER: CPT | Performed by: SURGERY

## 2025-05-21 PROCEDURE — 3044F HG A1C LEVEL LT 7.0%: CPT | Performed by: SURGERY

## 2025-05-21 PROCEDURE — 4010F ACE/ARB THERAPY RXD/TAKEN: CPT | Performed by: SURGERY

## 2025-05-21 RX ORDER — ALPHA LIPOIC ACID 50 MG
CAPSULE ORAL
COMMUNITY

## 2025-05-21 ASSESSMENT — PAIN SCALES - GENERAL: PAINLEVEL_OUTOF10: 0-NO PAIN

## 2025-05-29 DIAGNOSIS — N32.81 OAB (OVERACTIVE BLADDER): ICD-10-CM

## 2025-05-30 RX ORDER — TROSPIUM CHLORIDE 20 MG/1
20 TABLET, FILM COATED ORAL 2 TIMES DAILY
Qty: 60 TABLET | Refills: 5 | Status: SHIPPED | OUTPATIENT
Start: 2025-05-30

## 2025-05-30 NOTE — TELEPHONE ENCOUNTER
Request received for   Requested Prescriptions     Pending Prescriptions Disp Refills    trospium (Sanctura) 20 mg tablet [Pharmacy Med Name: Trospium Chloride 20 MG Oral Tablet] 60 tablet 0     Sig: Take 1 tablet by mouth twice daily       Annie Joseph was last seen 5/14/2025 and has an appt for 8/15/2025.

## 2025-06-06 NOTE — PROGRESS NOTES
Physical Therapy Evaluation/Treatment          Patient Name: Annie Joseph  MRN: [id number]  Encounter Date: 7/7/2025  Time Calculation  Start Time: 1345  Stop Time: 1430  Time Calculation (min): 45 min    Visit Number:  1/12 (including evaluation)  Planned total visits: 12  Visit Authorized/insurance considerations:  07/02/2025: NO AUTH, MEDICARE A/B, MMO SUPP, ($0 USED PT/ST)   Progress Report due visit #10    Current Problem:  Problem List Items Addressed This Visit           ICD-10-CM    Fibromyalgia M79.7    Relevant Orders    Follow Up In Physical Therapy    Low back pain M54.50    Relevant Orders    Follow Up In Physical Therapy    Chronic pain of both knees - Primary M25.561, M25.562, G89.29    Relevant Orders    Follow Up In Physical Therapy    Chronic pain of both shoulders (Chronic) M25.511, G89.29, M25.512    Relevant Orders    Follow Up In Physical Therapy    Chronic upper back pain (Chronic) M54.9, G89.29    Relevant Orders    Follow Up In Physical Therapy     Other Visit Diagnoses         Codes      Lumbar radiculopathy     M54.16    Relevant Orders    Follow Up In Physical Therapy      Bilateral chronic knee pain     M25.561, M25.562, G89.29    Relevant Orders    Follow Up In Physical Therapy      Cervical spondylosis without myelopathy     M47.812    Relevant Orders    Follow Up In Physical Therapy      Thoracic spondylosis without myelopathy     M47.814    Relevant Orders    Follow Up In Physical Therapy          Subjective:  Subjective     SUBJECTIVE:  Main complaint:  Pt reports she is painful all over and has difficulty determining if the pain is from her fibromyalgia or due to other more specific reasons.  Knee pain is worse. Walking hurts, hurts to stand, and she can't sleep because the pain keeps her up.  Stairs also bother her knees.  She has needed to use a cane the past two weeks  because her knee pain worsened.  Her mid back and shoulder pain is affecting her neck.  Her neck is very  "stiff and some pain.  She reports she has very little ROM in her neck.  Both shoulders are painful all the time. Her hands hurt.  She reports sharp pains between her shoulder blades (pt was told by a provider she has a broken \"end knob\"); lower back pain limits her standing.  She is not able to walk two blocks.  Pt has h/o of incontinence.  Was seeing a pelvic floor therapist but does not perform the exercises.  [Note;  pt is not a candidate for aquatics due to incontinence]    Onset Date:  Rx:  Chronic    Patient reported hx of injury:   Chronic  Pt states she was getting up and down multiple times while working at the [employer] which is when her knee pain increased.  Her knee pain worsened over the past two weeks.  Had been using a cane but today she was ok without it.  Uses a cart in the store due to getting fatigued and being in so much pain all over.     What aggravates symptoms:  Stairs, standing, walking    What improves symptoms:  Massage   Cold and hot  Medication helps    Previous Medical Treatment:    Knees, injections  Therapy helps temporarily (pt did not continue with her exercises independently)  Cardiac rehab helped with her endurance and overall strength. 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  Pre-DM  Osteopenia  2 stent, MI 2022  Bottom of both lungs are not inflated    Imaging:    Imaging  MR lumbar spine wo IV contrast  Status: Final result     PACS Images     Show images for MR lumbar spine wo IV contrast  Signed by    Signed Time Phone Pager   Sergio Duran MD 6/16/2025 10:32 [phone]     Exam Information    Status Exam Begun Exam Ended   Final 6/13/2025 15:10 6/13/2025 15:35     Study Result    Narrative & Impression   Interpreted By:  Sergio Duran,   STUDY:  MRI of the lumbar spine without IV contrast;  " 6/13/2025 3:35 pm      INDICATION:  Signs/Symptoms:lumbar radiculopathy with neuro deficits; please  comment on Modic changes.      ,M54.16 Radiculopathy, lumbar region      COMPARISON:  06/11/2025 lumbar radiographs      ACCESSION NUMBER(S):  [id number]      ORDERING CLINICIAN:  MAYRA ANNE      TECHNIQUE:  Sagittal and axial STIR and T1-weighted MRI images of the lumbar  spine were acquired using a spondylolysis protocol.  No contrast was  administered.      FINDINGS:  There are 5 lumbar type vertebrae. The spine has 13ï¿½ level scoliosis  centered at L3 and 6ï¿½ level scoliosis centered at L5-S1 similar to  the radiographs.      The L2-3 through L5-S1 discs have degenerative desiccation with loss  of disc height along the inner aspects of the curvature most  prominent at L2-3 and L3-4. The vertebral body marrow adjacent to the  right side of the L2-3 disc has thin bands of degenerative edema.      The tip of the spinal cord ends normally at L1-2.      T10-11 and T11-12: The left neural foramen is mildly stenosed at  T10-11 by facet hypertrophy. Otherwise no significant stenoses are  noted.      T12-L1: No stenosis is noted.      L1-2: No stenosis is noted.      L2-3: The spinal canal and lateral recesses are mildly stenosed by  disc bulge. The facet joints are mildly arthritic.      L3-4: The lateral recesses and spinal canal are mildly stenosed by  facet hypertrophy, ligament thickening and disc protrusion. The right  neural foramen is mildly stenosed by facet hypertrophy and disc  bulge. The facet joints are moderately to severely arthritic worse on  the right.      L4-5: Left lateral recess is moderately stenosed with mild central  canal and right lateral recess stenosis secondary to facet  hypertrophy, ligament thickening and disc bulge. The neural foramina  are mildly stenosed bilaterally by facet hypertrophy and disc bulge.  The facet joints are severely arthritic.      L5-S1: The facet joints are  severely arthritic worse on the left. The  left lateral recess and foramen are moderately stenosed by lateral  disc bulge/endplate spurring. The left S1 nerve root sleeve has a 5  mm cyst in the left lateral recess.      S3 has a 1.7 cm nerve root sleeve cyst centrally in the canal more so  toward the left.      No paraspinous mass or inflammatory process is noted. Anterior right  kidney has a less than 1 cm cyst.      IMPRESSION:  Multilevel degenerative changes are present related to the scoliosis.  The stenoses are most prominent at L3-4 and L4-5.      I personally reviewed the images/study and I agree with the findings  as stated. This study was interpreted at [place], [place].      MACRO:  None      Signed by: Sergio Duran 6/16/2025 10:32 AM  Dictation workstation:   [id number]     XR cervical spine 2-3 views  Status: Final result     PACS Images     Show images for XR cervical spine 2-3 views  Signed by    Signed Time Phone Pager   Don Cornell MD 6/12/2025 20:49 [phone]     Exam Information    Status Exam Begun Exam Ended   Final 6/11/2025 14:28 6/11/2025 16:00     Study Result    Narrative & Impression   Interpreted By:  Don Cornell,   STUDY:  XR CERVICAL SPINE 2-3 VIEWS; ;  6/11/2025 4:00 pm      INDICATION:  Signs/Symptoms:cervical spondylosis.      ,M47.812 Spondylosis without myelopathy or radiculopathy, cervical  region      COMPARISON:  None.      ACCESSION NUMBER(S):  [id number]      ORDERING CLINICIAN:  MAYRA ANNE      FINDINGS:  C-spine, two views      There is no fracture. There is no spondylolisthesis. Mild disc space  narrowing with osteophytosis at C5-C6. There is mild facet disease as  well.. The prevertebral soft tissues are within normal limits.      IMPRESSION:  Mild spondylosis worse at C5-6. Mild facet disease          MACRO:  None      Signed by: Don Cornell 6/12/2025 8:49 PM  Dictation workstation:    [id number]     XR knee 3 views bilateral  Status: Final result     PACS Images     Show images for XR knee 3 views bilateral  Signed by    Signed Time Phone Pager   Don Cornell MD 6/12/2025 20:50 [phone]     Exam Information    Status Exam Begun Exam Ended   Final 6/11/2025 14:28 6/11/2025 16:00     Study Result    Narrative & Impression   Interpreted By:  Don Cornell,   STUDY:  XR KNEE 3 VIEWS BILATERAL; ;  6/11/2025 4:00 pm      INDICATION:  Signs/Symptoms:bilateral knee pain.      ,M25.561 Pain in right knee,M25.562 Pain in left knee,G89.29 Other  chronic pain      COMPARISON:  None.      ACCESSION NUMBER(S):  [id number]      ORDERING CLINICIAN:  MAYRA ANNE      FINDINGS:  Bilateral knees, 6 views      Severe joint space narrowing with osteophytosis and sclerosis in the  medial compartments bilaterally. Moderate changes in the lateral and  patellofemoral compart  ments. Small effusions. No fracture seen      IMPRESSION:  Severe medial compartment osteoarthritis bilaterally          MACRO:  None      Signed by: Don Cornell 6/12/2025 8:50 PM  Dictation workstation:   [id number]       Cardiology, Vascular, and Other Imaging  No other imaging results found for the past 7 days      Previous Therapy Treatments:    PT      Pt stated Goal:    Knees:  less knee to walk, go up stairs  Neck:  Less stiffness in neck for daily activities (sewing, reading)  Shoulders and mid back:  Less pain and increased ROM to work on computer and put clothes on hangers, reach with her arms outward and up    General:        Precautions:  Precautions  STEADI Fall Risk Score (The score of 4 or more indicates an increased risk of falling): 6/12     Pain:  Pain Assessment: 0-10  0-10 (Numeric) Pain Score: 4 (Past week;  3-7)  Pain Type: Chronic pain  Pain Location: Generalized (both knees gives her the most pain; and reports pain all over from the fibromyalgia (neck back feet hands))  Pain Orientation: Right,  Left  Pain Descriptors: Burning, Aching, Sharp, Tightness  Pain Frequency: Constant/continuous  Clinical Progression: Gradually worsening    Home Living:  Home Living Comment: yes    Home type: House  Stairs: Yes  Lives with: Spouse    Vocation:    Retired  Job/Job tasks: [employer]    Prior Function Per Pt/Caregiver Report:  Level of [place]: Independent with ADLs and functional transfers, Independent with homemaking with ambulation    OBJECTIVE:    Posture:  Posture Comment: rounded back, forward shoulder, scoliosis, poor sitting posture    ROM and Strength:    Cervical:    See below     AROM STRENGTH   Cervical     Flexion WFL  50* Good  Good   Extension     /////////////////////////////////////////////////////////////////////    R L R L   Rotation 60 * 60* Good- Good-   Sidebend WFL WFL Good- Fair+*     *pain    Shoulder:    See below     AROM STRENGTH   Shoulder R L R L   Flexion 130* 140* 3/5 3/5   Extension WFL WFL 4+/5 4+/5   Abduction 130* 120* and tingling in all her fingers 4/5 4/5   Adduction wfl wfl 4/5 4/5   Internal  Rotation 60 60 4/5 4/5   External Rotation 90 90 4/5 4/5     *pain    Elbow:    Strength:  WFL and AROM:  WFL    Neural   Right Left   Slump negative negative   SLR negative negative     Lumbar:      See below    Lumbar AROM STRENGTH    Flexion 25% limited Fair+    Extension To neutral Fair+    ///////////////////////////////////////////////////////////////////////    AROM STRENGTH    R L R L   Rotation 25% limited 25% limited fair fair   Sidebend WFL WFL fair fair     Hip:    AROM:  WFL       STRENGTH   Hip R L   Hip Flexion 4/5 4/5   Hip Abduction 4/5 4/5   Hip Adduction 4/5 4/5     Knee:    See below     AROM STRENGTH   Knee R L R L   Knee Flexion 115 120 4/5 4/5   Knee Extension WFL WFL 4+/5 4+/5     Ankle:      Strength:  WFL and AROM:  WFL      Flexibility:       R  L   Pectoralis Tight Tight   Piriformis Tight Tight   Hamstring Tight Tight        Outcome Measures:  Other  Measures  Lower Extremity Funtional Score (LEFS): 19  Other Outcome Measures: UEFI 35/80       Assessment  PT Assessment Results: Decreased strength, Decreased range of motion, Decreased endurance, Impaired balance, Decreased mobility, Obesity, Orthopedic restrictions, Pain  Rehab Prognosis: Good    Pt is a 70 y.o. female who presents with decreased ROM, pain and weakness in Cx, shoulder, Lx and knees impairing the functional use of her Ues and functional mobility.  Pt would benefit from skilled physical therapy to improve flexibility, ROM, strength to reduce pain and improve the patient's current level of functioning including routine exercise program.  Pt would also benefit from proper body mechanics and ergonomic training to better manage the patient's symptoms and reduce exacerbation.      Pt's recovery time and progress/outcomes will likely be impacted by the patient;s history of Fibromyalgia, obesity, multiple areas of impairment    Based on the history including personal factors and/or comorbidities, examination of body systems including body structures and function, activity limitations, and/or participation restrictions, as well as clinical presentation, patient meets criteria for moderate complexity evaluation.    Plan  Treatment/Interventions: Education/ Instruction, Manual therapy, Gait training, Neuromuscular re-education, Therapeutic activities, Therapeutic exercises  PT Plan: Skilled PT  PT Frequency: 2 times per week  Duration: 12 week cert period  Onset Date: 05/19/25  Certification Period Start Date: 07/07/25  Certification Period End Date: 10/05/25  Number of Treatments Authorized: 12  Rehab Potential: Good  Plan of Care Agreement: Patient    OP EDUCATION:  Outpatient Education  Individual(s) Educated: Patient  Education Provided: Body Mechanics, Anatomy, Home Exercise Program, Physiology, POC  Risk and Benefits Discussed with Patient/Caregiver/Other: yes  Patient/Caregiver Demonstrated  Understanding: yes  Plan of Care Discussed and Agreed Upon: yes  Patient Response to Education: Patient/Caregiver Verbalized Understanding of Information    Goals:    Active       PT Problem - Cx, shoulders, lx, knees       PT Goal 1 - STG       Start:  07/07/25    Expected End:  08/21/25       1.  Improve Cx AROM by 15 deg at deficits   2.  Improve shoulder AROM by 10-20 deg at deficits  3.  Improve shoulder strength to 4+/5 grade at deficits  4.  Improve LE strength 4/5  5.  Improve trunk strength to Good-  6.  Improve bilateral LE muscle length to WFL  7.  Improve sitting posture with correct alignment of Cx region and shoulders  10.  Pain 2-3  11.  Functional Outcome Measure:  LEFS 10, UEFS 20; STEADI 4/12             PT Goal 2 - LT FUNCTIONAL GOALS       Start:  07/07/25    Expected End:  10/05/25       LONG TERM FUNCTIONAL GOALS    1. Pt able to sit and relax, perform her crafts, computer work and perform IADLs 60% of the time with minimal neck, thoracic and neck pain.  Pt able to rotate her head comfortably for activities at home  2. Pt able to effectively use her arms for IADLs  3. Pt able to walk for 20 minutes in the community 50% of the time with minimal knee pain  4. Pt able to negotiate stairs 50% of the time with minimal bilateral knee pain  5. Independent and compliant with HEP             Patient Stated Goal 1       Start:  07/07/25    Expected End:  10/05/25       Knees:  less knee to walk, go up stairs  Neck:  Less stiffness in neck for daily activities (sewing, reading)  Shoulders and mid back:  Less pain and increased ROM to work on computer and put clothes on hangers, reach with her arms outward and up             Treatments this date:  OP PT EDUCATION:    Pt education on following through with her HEP after therapy is complete  Rationale for PT POC    Billed Treatment Times:  Therapeutic Activity 5 min    Plan for next visit:  begin working with on her knees and cores and work toward her  shoulders and neck, DLS, postural re-education, Cx/shoulder/Lx/LE flexibility and strengthening.

## 2025-06-10 ENCOUNTER — APPOINTMENT (OUTPATIENT)
Dept: RADIOLOGY | Facility: HOSPITAL | Age: 70
End: 2025-06-10
Payer: MEDICARE

## 2025-06-11 ENCOUNTER — HOSPITAL ENCOUNTER (OUTPATIENT)
Dept: RADIOLOGY | Facility: HOSPITAL | Age: 70
Discharge: HOME | End: 2025-06-11
Payer: MEDICARE

## 2025-06-11 DIAGNOSIS — C64.2 MALIGNANT NEOPLASM OF LEFT KIDNEY (MULTI): ICD-10-CM

## 2025-06-11 DIAGNOSIS — M54.16 LUMBAR RADICULOPATHY: ICD-10-CM

## 2025-06-11 DIAGNOSIS — G89.29 BILATERAL CHRONIC KNEE PAIN: ICD-10-CM

## 2025-06-11 DIAGNOSIS — M25.562 BILATERAL CHRONIC KNEE PAIN: ICD-10-CM

## 2025-06-11 DIAGNOSIS — M25.561 BILATERAL CHRONIC KNEE PAIN: ICD-10-CM

## 2025-06-11 DIAGNOSIS — M47.812 CERVICAL SPONDYLOSIS WITHOUT MYELOPATHY: ICD-10-CM

## 2025-06-11 DIAGNOSIS — M47.814 THORACIC SPONDYLOSIS WITHOUT MYELOPATHY: ICD-10-CM

## 2025-06-11 PROCEDURE — 72114 X-RAY EXAM L-S SPINE BENDING: CPT

## 2025-06-11 PROCEDURE — 71046 X-RAY EXAM CHEST 2 VIEWS: CPT

## 2025-06-11 PROCEDURE — 72040 X-RAY EXAM NECK SPINE 2-3 VW: CPT

## 2025-06-11 PROCEDURE — 74176 CT ABD & PELVIS W/O CONTRAST: CPT

## 2025-06-11 PROCEDURE — 73562 X-RAY EXAM OF KNEE 3: CPT | Mod: 50

## 2025-06-11 PROCEDURE — 72072 X-RAY EXAM THORAC SPINE 3VWS: CPT

## 2025-06-13 ENCOUNTER — HOSPITAL ENCOUNTER (OUTPATIENT)
Dept: RADIOLOGY | Facility: HOSPITAL | Age: 70
Discharge: HOME | End: 2025-06-13
Payer: MEDICARE

## 2025-06-13 DIAGNOSIS — M54.16 LUMBAR RADICULOPATHY: ICD-10-CM

## 2025-06-13 PROCEDURE — 72148 MRI LUMBAR SPINE W/O DYE: CPT

## 2025-06-18 ENCOUNTER — TELEPHONE (OUTPATIENT)
Dept: PRIMARY CARE | Facility: CLINIC | Age: 70
End: 2025-06-18
Payer: MEDICARE

## 2025-06-18 NOTE — TELEPHONE ENCOUNTER
Pt previously saw sleep specialist. Has now been a couple of years. She would like to know what sleep specialist you recommend as she is dealing with fatigue. Pt would like call back with recommendation   Show Additional Area 4: Yes Additional Area 2 Units: 0 Detail Level: Detailed Lot #: [id number] Forehead Units: 8 Dilution (U/0.1 Cc): 4 Glabellar Complex Units: 10 Post-Care Instructions: Patient instructed to not lie down for 4 hours and limit physical activity for 24 hours. RN recommends topical arnica and/or ice if bruising presents.\\n\\nPt instructed to contact the clinic with any questions, concerns, or post treatment complications.\\n\\Danilo pt concerns and questions addressed and pt verbalized understanding. Comments: Pt asked to animate muscles in the treatment area and musculature was palpated for proper injection placement. Price (Use Numbers Only, No Special Characters Or $): 483 Consent: Written consent reviewed by RN and signed by pt. Risks include but not limited to lid/brow ptosis, bruising, swelling, diplopia, temporary effect, incomplete chemical denervation.   \\nPt's taking blood thinners are counseled on the increased risk of bleeding and bruising at the injection site. Incrementing Botox Units: By 0.5 Units

## 2025-07-07 ENCOUNTER — EVALUATION (OUTPATIENT)
Dept: PHYSICAL THERAPY | Facility: CLINIC | Age: 70
End: 2025-07-07
Payer: MEDICARE

## 2025-07-07 DIAGNOSIS — G89.29 CHRONIC PAIN OF BOTH SHOULDERS: Chronic | ICD-10-CM

## 2025-07-07 DIAGNOSIS — M25.562 BILATERAL CHRONIC KNEE PAIN: ICD-10-CM

## 2025-07-07 DIAGNOSIS — G89.29 CHRONIC PAIN OF BOTH KNEES: Primary | ICD-10-CM

## 2025-07-07 DIAGNOSIS — G89.29 BILATERAL CHRONIC KNEE PAIN: ICD-10-CM

## 2025-07-07 DIAGNOSIS — M47.812 CERVICAL SPONDYLOSIS WITHOUT MYELOPATHY: ICD-10-CM

## 2025-07-07 DIAGNOSIS — M25.561 BILATERAL CHRONIC KNEE PAIN: ICD-10-CM

## 2025-07-07 DIAGNOSIS — G89.29 CHRONIC BILATERAL LOW BACK PAIN WITH LEFT-SIDED SCIATICA: ICD-10-CM

## 2025-07-07 DIAGNOSIS — M25.562 CHRONIC PAIN OF BOTH KNEES: Primary | ICD-10-CM

## 2025-07-07 DIAGNOSIS — M79.7 FIBROMYALGIA: ICD-10-CM

## 2025-07-07 DIAGNOSIS — M25.561 CHRONIC PAIN OF BOTH KNEES: Primary | ICD-10-CM

## 2025-07-07 DIAGNOSIS — M47.814 THORACIC SPONDYLOSIS WITHOUT MYELOPATHY: ICD-10-CM

## 2025-07-07 DIAGNOSIS — M25.512 CHRONIC PAIN OF BOTH SHOULDERS: Chronic | ICD-10-CM

## 2025-07-07 DIAGNOSIS — M54.9 CHRONIC UPPER BACK PAIN: Chronic | ICD-10-CM

## 2025-07-07 DIAGNOSIS — M54.42 CHRONIC BILATERAL LOW BACK PAIN WITH LEFT-SIDED SCIATICA: ICD-10-CM

## 2025-07-07 DIAGNOSIS — M54.16 LUMBAR RADICULOPATHY: ICD-10-CM

## 2025-07-07 DIAGNOSIS — G89.29 CHRONIC UPPER BACK PAIN: Chronic | ICD-10-CM

## 2025-07-07 DIAGNOSIS — M25.511 CHRONIC PAIN OF BOTH SHOULDERS: Chronic | ICD-10-CM

## 2025-07-07 PROCEDURE — 97161 PT EVAL LOW COMPLEX 20 MIN: CPT | Mod: GP | Performed by: PHYSICAL THERAPIST

## 2025-07-07 ASSESSMENT — ENCOUNTER SYMPTOMS
LOSS OF SENSATION IN FEET: 0
OCCASIONAL FEELINGS OF UNSTEADINESS: 1
DEPRESSION: 0

## 2025-07-07 ASSESSMENT — PAIN - FUNCTIONAL ASSESSMENT: PAIN_FUNCTIONAL_ASSESSMENT: 0-10

## 2025-07-07 ASSESSMENT — PAIN SCALES - GENERAL: PAINLEVEL_OUTOF10: 4

## 2025-07-09 ENCOUNTER — TREATMENT (OUTPATIENT)
Dept: PHYSICAL THERAPY | Facility: CLINIC | Age: 70
End: 2025-07-09
Payer: MEDICARE

## 2025-07-09 DIAGNOSIS — G89.29 CHRONIC PAIN OF BOTH KNEES: ICD-10-CM

## 2025-07-09 DIAGNOSIS — M79.7 FIBROMYALGIA: ICD-10-CM

## 2025-07-09 DIAGNOSIS — M25.511 CHRONIC PAIN OF BOTH SHOULDERS: Chronic | ICD-10-CM

## 2025-07-09 DIAGNOSIS — M25.561 CHRONIC PAIN OF BOTH KNEES: ICD-10-CM

## 2025-07-09 DIAGNOSIS — M47.814 THORACIC SPONDYLOSIS WITHOUT MYELOPATHY: ICD-10-CM

## 2025-07-09 DIAGNOSIS — M25.512 CHRONIC PAIN OF BOTH SHOULDERS: Chronic | ICD-10-CM

## 2025-07-09 DIAGNOSIS — M54.42 CHRONIC BILATERAL LOW BACK PAIN WITH LEFT-SIDED SCIATICA: ICD-10-CM

## 2025-07-09 DIAGNOSIS — M25.562 CHRONIC PAIN OF BOTH KNEES: ICD-10-CM

## 2025-07-09 DIAGNOSIS — G89.29 CHRONIC PAIN OF BOTH SHOULDERS: Chronic | ICD-10-CM

## 2025-07-09 DIAGNOSIS — M54.9 CHRONIC UPPER BACK PAIN: Chronic | ICD-10-CM

## 2025-07-09 DIAGNOSIS — M47.812 CERVICAL SPONDYLOSIS WITHOUT MYELOPATHY: ICD-10-CM

## 2025-07-09 DIAGNOSIS — M25.562 BILATERAL CHRONIC KNEE PAIN: ICD-10-CM

## 2025-07-09 DIAGNOSIS — M25.561 BILATERAL CHRONIC KNEE PAIN: ICD-10-CM

## 2025-07-09 DIAGNOSIS — G89.29 BILATERAL CHRONIC KNEE PAIN: ICD-10-CM

## 2025-07-09 DIAGNOSIS — M54.16 LUMBAR RADICULOPATHY: ICD-10-CM

## 2025-07-09 DIAGNOSIS — G89.29 CHRONIC BILATERAL LOW BACK PAIN WITH LEFT-SIDED SCIATICA: ICD-10-CM

## 2025-07-09 DIAGNOSIS — G89.29 CHRONIC UPPER BACK PAIN: Chronic | ICD-10-CM

## 2025-07-09 PROCEDURE — 97110 THERAPEUTIC EXERCISES: CPT | Mod: GP,CQ

## 2025-07-09 PROCEDURE — 97113 AQUATIC THERAPY/EXERCISES: CPT | Mod: GP,CQ

## 2025-07-09 PROCEDURE — 97112 NEUROMUSCULAR REEDUCATION: CPT | Mod: GP,CQ

## 2025-07-09 ASSESSMENT — PAIN - FUNCTIONAL ASSESSMENT: PAIN_FUNCTIONAL_ASSESSMENT: 0-10

## 2025-07-09 ASSESSMENT — PAIN SCALES - GENERAL: PAINLEVEL_OUTOF10: 5 - MODERATE PAIN

## 2025-07-09 NOTE — PROGRESS NOTES
Physical Therapy Treatment    Patient Name: Annie Joseph  MRN: [id number]  Encounter date:  7/9/2025  Time Calculation  Start Time: 1445  Stop Time: 1525  Time Calculation (min): 40 min     PT Therapeutic Procedures Time Entry  Therapeutic Exercise Time Entry: 35  Neuromuscular Re-Education Time Entry: 5     Visit Number:  2/12 (including evaluation)  Planned total visits: 12  Visit Authorized/insurance considerations:  07/02/2025: NO AUTH, MEDICARE A/B, MMO SUPP, ($0 USED PT/ST)   Progress Report due visit #10      Current Problem  Problem List Items Addressed This Visit           ICD-10-CM    Fibromyalgia M79.7    Low back pain M54.50    Chronic pain of both knees M25.561, M25.562, G89.29    Chronic pain of both shoulders (Chronic) M25.511, G89.29, M25.512    Chronic upper back pain (Chronic) M54.9, G89.29     Other Visit Diagnoses         Codes      Lumbar radiculopathy     M54.16      Bilateral chronic knee pain     M25.561, M25.562, G89.29      Cervical spondylosis without myelopathy     M47.812      Thoracic spondylosis without myelopathy     M47.814            Precautions   CA  Pre DM  Heart Disease  HTN  Osteopenia  OA  Fibryomyalgia     Red flags:  Hx of Claire kidney CA, left kidney removed, 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 Previously detailed functional limitations remain. Pt reports not using cane since Monday of this week.  Pt reports not currently performing any exercises at home..   Objective  Pt arrives ambulating without assistive device, demonstrating mild decreased stride length R and L.      Treatment:     Nu-step S= 7  R=1   x 5 minutes  -seated LAQ  "0#  1 x 10 R and L  -seated hip adduction ball squeeze 10 x 3\"  -seated hip abduction (clamshell) with light blue band x 10 reps     -Standing knee flexion x 10 reps each  -standing hip abduction R and L x 10 reps each    Neuromuscular re-ed:  -stand unsupported, NBOS, solid x30\"  -tandem stance R and L  1 x 30\"    Current HEP:  Access Code: [email or web address]  URL: [email or web address]  Date: 07/09/2025  Prepared by: Bari Eugene    Exercises  - Seated Long Arc Quad  - 1-2 x daily - 6 x weekly - 2 sets - 10 reps  - Standing Knee Flexion  - 1-2 x daily - 6 x weekly - 2 sets - 10 reps  - Standing Hip Abduction with Counter Support  - 1-2 x daily - 6 x weekly - 2 sets - 10 reps  - Seated Hip Adduction Isometrics with Ball  - 1-2 x daily - 6 x weekly - 2 sets - 10 reps - 3 hold  - Seated Hip Abduction with Resistance  - 1-2 x daily - 6 x weekly - 2 sets - 10 reps  - Standing Tandem Balance with Counter Support  - 1-2 x daily - 6 x weekly - 3 reps - 15 hold    Has patient been compliant with HEP? Just initiated this date    Activity tolerance:   Limited secondary to pain    OP EDUCATION:   Updated HEP.     Assessment:   Patient tolerated treatment well. Patient appears to be working hard in clinic and motivated to decrease pain and restore all functional deficits. Verbal and/or tactile cues given for proper form, and avoidance of substitution patterns with all exercises. All infection control policies followed during this visit. Pt provided numerous verbal cueing to re-direct to task/exercise at hand, along with cueing for upright trunk posture .   Pt's response to treatment:  fatigued  Areas of improvements:  knowledge of HEP; initiation of clinical exercises  Limitations/deficits:  chronic pain; numerous systemic issues;  generalized deconditioning.     Pain end of session: 5 of 10    Plan:  Plan for next visit:  begin working with on her knees and cores and work toward her shoulders and neck, DLS, postural " re-education, Cx/shoulder/Lx/LE flexibility and strengthening.     Treatment/Interventions: Education/ Instruction, Manual therapy, Gait training, Neuromuscular re-education, Therapeutic activities, Therapeutic exercises  PT Plan: Skilled PT  PT Frequency: 2 times per week  Duration: 12 week cert period  Onset Date: 05/19/25  Certification Period Start Date: 07/07/25  Certification Period End Date: 10/05/25  Number of Treatments Authorized: 12  Rehab Potential: Good  Plan of Care Agreement: Patient  Continue with current POC/no changes    Assessment of current progress against goals:  Insufficient treatment time to assess progress    Goals:  Active       PT Problem - Cx, shoulders, lx, knees       PT Goal 1 - STG       Start:  07/07/25    Expected End:  08/21/25       1.  Improve Cx AROM by 15 deg at deficits   2.  Improve shoulder AROM by 10-20 deg at deficits  3.  Improve shoulder strength to 4+/5 grade at deficits  4.  Improve LE strength 4/5  5.  Improve trunk strength to Good-  6.  Improve bilateral LE muscle length to WFL  7.  Improve sitting posture with correct alignment of Cx region and shoulders  10.  Pain 2-3  11.  Functional Outcome Measure:  LEFS 10, UEFS 20; STEADI 4/12             PT Goal 2 - LT FUNCTIONAL GOALS       Start:  07/07/25    Expected End:  10/05/25       LONG TERM FUNCTIONAL GOALS    1. Pt able to sit and relax, perform her crafts, computer work and perform IADLs 60% of the time with minimal neck, thoracic and neck pain.  Pt able to rotate her head comfortably for activities at home  2. Pt able to effectively use her arms for IADLs  3. Pt able to walk for 20 minutes in the community 50% of the time with minimal knee pain  4. Pt able to negotiate stairs 50% of the time with minimal bilateral knee pain  5. Independent and compliant with HEP             Patient Stated Goal 1       Start:  07/07/25    Expected End:  10/05/25       Knees:  less knee to walk, go up stairs  Neck:  Less stiffness  in neck for daily activities (sewing, reading)  Shoulders and mid back:  Less pain and increased ROM to work on computer and put clothes on hangers, reach with her arms outward and up

## 2025-07-16 ENCOUNTER — PREP FOR PROCEDURE (OUTPATIENT)
Dept: PAIN MEDICINE | Facility: CLINIC | Age: 70
End: 2025-07-16
Payer: MEDICARE

## 2025-07-16 ENCOUNTER — OFFICE VISIT (OUTPATIENT)
Dept: PAIN MEDICINE | Facility: CLINIC | Age: 70
End: 2025-07-16
Payer: MEDICARE

## 2025-07-16 VITALS — HEART RATE: 84 BPM | SYSTOLIC BLOOD PRESSURE: 124 MMHG | DIASTOLIC BLOOD PRESSURE: 70 MMHG | OXYGEN SATURATION: 96 %

## 2025-07-16 DIAGNOSIS — M54.16 LUMBAR RADICULOPATHY: Primary | ICD-10-CM

## 2025-07-16 DIAGNOSIS — M25.561 BILATERAL CHRONIC KNEE PAIN: ICD-10-CM

## 2025-07-16 DIAGNOSIS — M79.7 FIBROMYALGIA: ICD-10-CM

## 2025-07-16 DIAGNOSIS — M25.562 BILATERAL CHRONIC KNEE PAIN: ICD-10-CM

## 2025-07-16 DIAGNOSIS — G89.29 BILATERAL CHRONIC KNEE PAIN: ICD-10-CM

## 2025-07-16 PROCEDURE — 3078F DIAST BP <80 MM HG: CPT | Performed by: ANESTHESIOLOGY

## 2025-07-16 PROCEDURE — 4010F ACE/ARB THERAPY RXD/TAKEN: CPT | Performed by: ANESTHESIOLOGY

## 2025-07-16 PROCEDURE — 1125F AMNT PAIN NOTED PAIN PRSNT: CPT | Performed by: ANESTHESIOLOGY

## 2025-07-16 PROCEDURE — G2211 COMPLEX E/M VISIT ADD ON: HCPCS | Performed by: ANESTHESIOLOGY

## 2025-07-16 PROCEDURE — 3074F SYST BP LT 130 MM HG: CPT | Performed by: ANESTHESIOLOGY

## 2025-07-16 PROCEDURE — 1159F MED LIST DOCD IN RCRD: CPT | Performed by: ANESTHESIOLOGY

## 2025-07-16 PROCEDURE — 99214 OFFICE O/P EST MOD 30 MIN: CPT | Performed by: ANESTHESIOLOGY

## 2025-07-16 PROCEDURE — 1036F TOBACCO NON-USER: CPT | Performed by: ANESTHESIOLOGY

## 2025-07-16 RX ORDER — TOPIRAMATE 25 MG/1
25 TABLET, FILM COATED ORAL 2 TIMES DAILY
Qty: 60 TABLET | Refills: 2 | Status: SHIPPED | OUTPATIENT
Start: 2025-07-16 | End: 2026-07-16

## 2025-07-16 ASSESSMENT — PATIENT HEALTH QUESTIONNAIRE - PHQ9
SUM OF ALL RESPONSES TO PHQ9 QUESTIONS 1 AND 2: 0
1. LITTLE INTEREST OR PLEASURE IN DOING THINGS: NOT AT ALL
2. FEELING DOWN, DEPRESSED OR HOPELESS: NOT AT ALL

## 2025-07-16 ASSESSMENT — PAIN - FUNCTIONAL ASSESSMENT: PAIN_FUNCTIONAL_ASSESSMENT: 0-10

## 2025-07-16 ASSESSMENT — PAIN SCALES - GENERAL
PAINLEVEL_OUTOF10: 4
PAINLEVEL_OUTOF10: 4

## 2025-07-16 ASSESSMENT — ENCOUNTER SYMPTOMS
LOSS OF SENSATION IN FEET: 0
DEPRESSION: 0
JOINT SWELLING: 1
ARTHRALGIAS: 1
ACTIVITY CHANGE: 1
OCCASIONAL FEELINGS OF UNSTEADINESS: 0
BACK PAIN: 1

## 2025-07-16 ASSESSMENT — PAIN DESCRIPTION - DESCRIPTORS: DESCRIPTORS: ACHING;BURNING;THROBBING;STABBING

## 2025-07-16 NOTE — PROGRESS NOTES
The patient is a 70-year-old female with low back and bilateral leg pain.  The pain is worse when she is standing and walking.  She gets some relief with rest.  She has modified her lifestyle because of the pain.  She is participating in a formal physical therapy program.  Incidentally, the patient is seeing Dr. Donovan is seen for her knees and is in need of total knee arthroplasty.  She also has a history of fibromyalgia.  The patient is here to review the results of her imaging including the MRI of the lumbar spine.    Review of Systems   Constitutional:  Positive for activity change.   Musculoskeletal:  Positive for arthralgias, back pain, gait problem and joint swelling.   All other systems reviewed and are negative.    GENERAL: alert and appropriate, in no distress, well-hydrated, well-nourished, interactive  SKIN: no rash noted  RESPIRATORY: breathing non-labored and no grunting/flaring/retractions  CHEST: equal chest rise with normal respiratory effort  ABDOMEN: soft and non-tender  BACK: back normal in appearance, spine with reduced ROM  EXTREMITIES: strength intact, bilateral knee range of motion reduced and painful  NEUROLOGIC: gait antalgic, SLR negative, sensation grossly intact.    Assessment and Plan    -Chronicity--chronic musculoskeletal pain    -Diagnostics--we reviewed the imaging that was ordered at her last visit including the MRI of the lumbar spine    -Pharmacologic--the patient may benefit from the addition of topiramate.  We discussed the mechanism of action of this medication as well as the side effect profile.    -Psychologic--no need for psychologic intervention from my standpoint.  There are no mental health issues of which I am aware that are contributing to the patient's pain.  There are no substance abuse or alcohol abuse issues of which I am aware that are contributing to the patient's pain.    -Physical--we discussed the importance of physical therapy and exercise.  We discussed  avoidance and modification techniques.    -Intervention--the patient is a candidate for an interlaminar epidural steroid injection at the L4-L5 level.  I explained the risks benefits and alternatives of the procedure to the patient.  The patient wishes to proceed.    I spent time educating the patient on the condition including the treatment and the prognosis.  I invited the patient to call at anytime with any questions.

## 2025-07-24 ENCOUNTER — ANCILLARY PROCEDURE (OUTPATIENT)
Dept: RADIOLOGY | Facility: EXTERNAL LOCATION | Age: 70
End: 2025-07-24
Payer: MEDICARE

## 2025-07-24 DIAGNOSIS — M54.16 RADICULOPATHY, LUMBAR REGION: ICD-10-CM

## 2025-07-24 PROCEDURE — 62323 NJX INTERLAMINAR LMBR/SAC: CPT | Performed by: ANESTHESIOLOGY

## 2025-07-24 NOTE — PROGRESS NOTES
Pre and postprocedure diagnosis--lumbar radiculopathy    Procedure--interlmainar epidural steroid injection L4-5    Anesthesia--local    Complications--none    Clinical note--the patient has a history of pain.  I explained the risks, benefits, and alternatives of the procedure to the patient.  The patient wishes to proceed.    Procedure Note--The patient was brought to the procedure room and placed in prone position.  Sterile prep and drape with ChloraPrep and a fenestrated drape.  8 mL of half percent lidocaine were injected through a 25-gauge spinal needle for local anesthesia.  An 18-gauge Touhy needle was guided to the L4-L5 interlaminar epidural space by loss-of-resistance technique under fluoroscopic guidance.  Contrast was injected under live fluoroscopy to ensure proper needle placement.  Then 60 mg of triamcinolone and 2 mL of half percent lidocaine were injected through the needle.  The needle was removed and the patient was transferred to recovery.

## 2025-08-06 ENCOUNTER — APPOINTMENT (OUTPATIENT)
Dept: PRIMARY CARE | Facility: CLINIC | Age: 70
End: 2025-08-06
Payer: MEDICARE

## 2025-08-06 VITALS
SYSTOLIC BLOOD PRESSURE: 130 MMHG | DIASTOLIC BLOOD PRESSURE: 82 MMHG | BODY MASS INDEX: 36.62 KG/M2 | OXYGEN SATURATION: 95 % | WEIGHT: 199 LBS | HEIGHT: 62 IN | HEART RATE: 74 BPM

## 2025-08-06 DIAGNOSIS — N18.31 STAGE 3A CHRONIC KIDNEY DISEASE (MULTI): ICD-10-CM

## 2025-08-06 DIAGNOSIS — E66.812 CLASS 2 OBESITY DUE TO EXCESS CALORIES WITHOUT SERIOUS COMORBIDITY WITH BODY MASS INDEX (BMI) OF 37.0 TO 37.9 IN ADULT: ICD-10-CM

## 2025-08-06 DIAGNOSIS — I25.10 CORONARY ARTERY DISEASE INVOLVING NATIVE CORONARY ARTERY OF NATIVE HEART WITHOUT ANGINA PECTORIS: ICD-10-CM

## 2025-08-06 DIAGNOSIS — M79.7 FIBROMYALGIA: ICD-10-CM

## 2025-08-06 DIAGNOSIS — E66.09 CLASS 2 OBESITY DUE TO EXCESS CALORIES WITHOUT SERIOUS COMORBIDITY WITH BODY MASS INDEX (BMI) OF 37.0 TO 37.9 IN ADULT: ICD-10-CM

## 2025-08-06 DIAGNOSIS — M54.16 LUMBAR RADICULOPATHY, CHRONIC: ICD-10-CM

## 2025-08-06 DIAGNOSIS — I10 HYPERTENSION, UNSPECIFIED TYPE: ICD-10-CM

## 2025-08-06 DIAGNOSIS — N32.81 OAB (OVERACTIVE BLADDER): ICD-10-CM

## 2025-08-06 DIAGNOSIS — E53.8 B12 DEFICIENCY: ICD-10-CM

## 2025-08-06 DIAGNOSIS — Z00.00 ROUTINE GENERAL MEDICAL EXAMINATION AT HEALTH CARE FACILITY: Primary | ICD-10-CM

## 2025-08-06 DIAGNOSIS — G47.33 OSA (OBSTRUCTIVE SLEEP APNEA): ICD-10-CM

## 2025-08-06 DIAGNOSIS — N18.32 CHRONIC KIDNEY DISEASE, STAGE 3B (MULTI): ICD-10-CM

## 2025-08-06 DIAGNOSIS — E11.9 TYPE 2 DIABETES MELLITUS WITHOUT COMPLICATION, WITHOUT LONG-TERM CURRENT USE OF INSULIN: ICD-10-CM

## 2025-08-06 PROCEDURE — G0439 PPPS, SUBSEQ VISIT: HCPCS | Performed by: STUDENT IN AN ORGANIZED HEALTH CARE EDUCATION/TRAINING PROGRAM

## 2025-08-06 PROCEDURE — 3075F SYST BP GE 130 - 139MM HG: CPT | Performed by: STUDENT IN AN ORGANIZED HEALTH CARE EDUCATION/TRAINING PROGRAM

## 2025-08-06 PROCEDURE — 1160F RVW MEDS BY RX/DR IN RCRD: CPT | Performed by: STUDENT IN AN ORGANIZED HEALTH CARE EDUCATION/TRAINING PROGRAM

## 2025-08-06 PROCEDURE — 1170F FXNL STATUS ASSESSED: CPT | Performed by: STUDENT IN AN ORGANIZED HEALTH CARE EDUCATION/TRAINING PROGRAM

## 2025-08-06 PROCEDURE — 1159F MED LIST DOCD IN RCRD: CPT | Performed by: STUDENT IN AN ORGANIZED HEALTH CARE EDUCATION/TRAINING PROGRAM

## 2025-08-06 PROCEDURE — 4010F ACE/ARB THERAPY RXD/TAKEN: CPT | Performed by: STUDENT IN AN ORGANIZED HEALTH CARE EDUCATION/TRAINING PROGRAM

## 2025-08-06 PROCEDURE — 1036F TOBACCO NON-USER: CPT | Performed by: STUDENT IN AN ORGANIZED HEALTH CARE EDUCATION/TRAINING PROGRAM

## 2025-08-06 PROCEDURE — 3008F BODY MASS INDEX DOCD: CPT | Performed by: STUDENT IN AN ORGANIZED HEALTH CARE EDUCATION/TRAINING PROGRAM

## 2025-08-06 PROCEDURE — 99214 OFFICE O/P EST MOD 30 MIN: CPT | Performed by: STUDENT IN AN ORGANIZED HEALTH CARE EDUCATION/TRAINING PROGRAM

## 2025-08-06 PROCEDURE — 3079F DIAST BP 80-89 MM HG: CPT | Performed by: STUDENT IN AN ORGANIZED HEALTH CARE EDUCATION/TRAINING PROGRAM

## 2025-08-06 RX ORDER — LIDOCAINE 50 MG/G
1 PATCH TOPICAL DAILY
Qty: 10 PATCH | Refills: 2 | Status: SHIPPED | OUTPATIENT
Start: 2025-08-06 | End: 2026-08-06

## 2025-08-06 ASSESSMENT — ENCOUNTER SYMPTOMS
SINUS PAIN: 0
ROS SKIN COMMENTS: NO SKIN LESIONS CHANGING COLOR, SHAPE OR SIZE
FEVER: 0
WEAKNESS: 0
NUMBNESS: 0
FREQUENCY: 0
SLEEP DISTURBANCE: 0
LOSS OF SENSATION IN FEET: 0
DIZZINESS: 0
SINUS PRESSURE: 0
PALPITATIONS: 0
SHORTNESS OF BREATH: 0
COUGH: 0
NAUSEA: 0
BACK PAIN: 1
FATIGUE: 0
OCCASIONAL FEELINGS OF UNSTEADINESS: 1
HEADACHES: 0
CONSTIPATION: 0
DIARRHEA: 0
NERVOUS/ANXIOUS: 0
DIFFICULTY URINATING: 0
BRUISES/BLEEDS EASILY: 0
SORE THROAT: 0
ABDOMINAL PAIN: 0

## 2025-08-06 ASSESSMENT — ACTIVITIES OF DAILY LIVING (ADL)
BATHING: INDEPENDENT
DRESSING: INDEPENDENT

## 2025-08-06 NOTE — PROGRESS NOTES
Subjective   Reason for Visit: Annie Joseph is an 70 y.o. female here for a Medicare Wellness visit.               HPI  Lumbar Radiculopathy/ Fibromyalgia   Pt has been working with Dr. Ureña   Pending interlaminar epidural steroid injection     Patient Care Team:  Slime Mcdermott DO as PCP - General (Family Medicine)  Slime Mcdermott DO as PCP - [facility] Attributed Provider  Demetria Dunham MD (Internal Medicine)     Review of Systems   Constitutional:  Negative for fatigue and fever.   HENT:  Negative for dental problem, sinus pressure, sinus pain and sore throat.         No changes in hearing   Eyes:  Negative for visual disturbance.        No vision changes    Respiratory:  Negative for cough and shortness of breath.    Cardiovascular:  Negative for chest pain, palpitations and leg swelling.   Gastrointestinal:  Negative for abdominal pain, constipation, diarrhea and nausea.   Genitourinary:  Negative for difficulty urinating, frequency and urgency.   Musculoskeletal:  Positive for back pain.   Skin:  Negative for rash.        No skin lesions changing color, shape or size   Neurological:  Negative for dizziness, weakness, numbness and headaches.   Hematological:  Does not bruise/bleed easily.   Psychiatric/Behavioral:  Negative for sleep disturbance. The patient is not nervous/anxious.    All other systems reviewed and are negative.      Objective   Vitals:  There were no vitals taken for this visit.      Physical Exam  Vitals reviewed.   Constitutional:       General: She is not in acute distress.     Appearance: She is not ill-appearing.   HENT:      Right Ear: Tympanic membrane and ear canal normal.      Left Ear: Tympanic membrane and ear canal normal.      Mouth/Throat:      Mouth: Mucous membranes are moist.      Pharynx: Oropharynx is clear. No oropharyngeal exudate or posterior oropharyngeal erythema.     Eyes:      Extraocular Movements: Extraocular movements intact.      Conjunctiva/sclera:  Conjunctivae normal.      Pupils: Pupils are equal, round, and reactive to light.     Neck:      Vascular: No carotid bruit.     Cardiovascular:      Rate and Rhythm: Normal rate and regular rhythm.      Heart sounds: No murmur heard.     No gallop.   Pulmonary:      Effort: Pulmonary effort is normal.      Breath sounds: Normal breath sounds. No wheezing, rhonchi or rales.   Abdominal:      General: Abdomen is flat. Bowel sounds are normal.      Palpations: Abdomen is soft.      Tenderness: There is no abdominal tenderness.     Musculoskeletal:      Cervical back: Neck supple.      Left lower leg: No edema.     Skin:     General: Skin is warm and dry.      Findings: No rash.     Neurological:      General: No focal deficit present.      Mental Status: She is alert and oriented to person, place, and time.      Gait: Gait normal.     Psychiatric:         Mood and Affect: Mood normal.         Behavior: Behavior normal.         Assessment & Plan  Routine general medical examination at health care facility         Type 2 diabetes mellitus without complication, without long-term current use of insulin    Orders:    Comprehensive Metabolic Panel; Future    Lipid Panel; Future    TSH with reflex to Free T4 if abnormal; Future    CBC and Auto Differential; Future    Hemoglobin A1C; Future    Albumin-Creatinine Ratio, Urine Random; Future    Fibromyalgia         Stage 3a chronic kidney disease (Multi)         Hypertension, unspecified type         Class 2 obesity due to excess calories without serious comorbidity with body mass index (BMI) of 37.0 to 37.9 in adult         OAB (overactive bladder)         Coronary artery disease involving native coronary artery of native heart without angina pectoris         B12 deficiency    Orders:    Vitamin B12; Future    Lumbar radiculopathy, chronic    Orders:    lidocaine (Lidoderm) 5 % patch; Place 1 patch over 12 hours on the skin once daily. Apply to painful area 12 hours per day,  remove for 12 hours.    ROBERT (obstructive sleep apnea)    Orders:    Referral to Adult Sleep Medicine; Future    Annie Joseph is a 70 year old female who presents to the office for follow up. PMHX fibromyalgia, HTN, GERD, IBS, MI and vitamin D deficiency      CAD   S/p MI      Anemia   Concerns for persist bleeding post   Could be causing some of her fatigue  has been gradually worsening.  Patient most recent hemoglobin approximately 10  This is likely contributing to her chronic fatigue  Patient denies any GI bleeding.There was an ulceration at the site of her previous polypectomy.  3 clips were placed at that time.  2/20/2024     HTN  Improved  130/82 mmHg  Continued amlodipine 5mg   Continued Valsartan 160 mg daily   Continue imdur 30 mg daily  Follow-up with cardiology  Physical exam was stable. Discussed maintaining diets such as DASH to help maintain normal Blood pressure. Encouraged regular exercise for a total of 120 min weekly. We will fu labs in 1-3 days. For now there will be no change in medical management. All questions and concerns were addressed. Pt will follow up in 4-6 months or sooner if needed.      Lumbar Radiculopathy   Pt has been working with Dr. Ureña   -S/p interlaminar epidural steroid injection   MRI 6/2025-Multilevel degenerative changes are present related to the scoliosis.  The stenoses are most prominent at L3-4 and L4-5  -Pt was prescribed topamax pt is hesitant to start this medication due to side effects   -lidocaine patches given     Obesity   Pt would like to increase activity levels   Due to chronic arthritis aquatic therapy ordered   Discussed portion sizes and activity recommendations      Renal Mass   S/p Left nephrectomy due to mass 4/23  Cr 1.3, GFR 45  Repeat today   CT 6/24 clear of reoccurrence. Pending repeat      CKD  Stage 3a  Post left nephrectomy and long standing HTN         ENT: recommendations of dilip pot and Flonase  Hearing loss      Pelvic floor  physical therapy: recommendation to gyn for a pessary previously followed with Dr. Aubree Valdez.      Constipation: advise to keep a diary of foods that are worsening her symptoms, worse with rice.   Increased difficulty in swallowing. 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   Also noted 7 cm Hiatal hernia noted on previous EGD which can contribute to symptoms  Continue PPI   Patient remains stable no urgent need for inpatient scope.   Pt struggles with compliance with her  medications   CT abdomen and pelvis- moderate sized hiatal hernia

## 2025-08-06 NOTE — ASSESSMENT & PLAN NOTE
Orders:    Comprehensive Metabolic Panel; Future    Lipid Panel; Future    TSH with reflex to Free T4 if abnormal; Future    CBC and Auto Differential; Future    Hemoglobin A1C; Future    Albumin-Creatinine Ratio, Urine Random; Future

## 2025-08-08 ENCOUNTER — TREATMENT (OUTPATIENT)
Dept: PHYSICAL THERAPY | Facility: CLINIC | Age: 70
End: 2025-08-08
Payer: MEDICARE

## 2025-08-08 DIAGNOSIS — M47.814 THORACIC SPONDYLOSIS WITHOUT MYELOPATHY: ICD-10-CM

## 2025-08-08 DIAGNOSIS — G89.29 CHRONIC UPPER BACK PAIN: Chronic | ICD-10-CM

## 2025-08-08 DIAGNOSIS — M25.511 CHRONIC PAIN OF BOTH SHOULDERS: Chronic | ICD-10-CM

## 2025-08-08 DIAGNOSIS — M25.561 BILATERAL CHRONIC KNEE PAIN: ICD-10-CM

## 2025-08-08 DIAGNOSIS — M25.561 CHRONIC PAIN OF BOTH KNEES: ICD-10-CM

## 2025-08-08 DIAGNOSIS — M25.562 BILATERAL CHRONIC KNEE PAIN: ICD-10-CM

## 2025-08-08 DIAGNOSIS — M25.562 CHRONIC PAIN OF BOTH KNEES: ICD-10-CM

## 2025-08-08 DIAGNOSIS — G89.29 CHRONIC PAIN OF BOTH KNEES: ICD-10-CM

## 2025-08-08 DIAGNOSIS — M54.16 LUMBAR RADICULOPATHY: ICD-10-CM

## 2025-08-08 DIAGNOSIS — M25.512 CHRONIC PAIN OF BOTH SHOULDERS: Chronic | ICD-10-CM

## 2025-08-08 DIAGNOSIS — G89.29 BILATERAL CHRONIC KNEE PAIN: ICD-10-CM

## 2025-08-08 DIAGNOSIS — G89.29 CHRONIC PAIN OF BOTH SHOULDERS: Chronic | ICD-10-CM

## 2025-08-08 DIAGNOSIS — M79.7 FIBROMYALGIA: ICD-10-CM

## 2025-08-08 DIAGNOSIS — G89.29 CHRONIC BILATERAL LOW BACK PAIN WITH LEFT-SIDED SCIATICA: ICD-10-CM

## 2025-08-08 DIAGNOSIS — M47.812 CERVICAL SPONDYLOSIS WITHOUT MYELOPATHY: ICD-10-CM

## 2025-08-08 DIAGNOSIS — M54.42 CHRONIC BILATERAL LOW BACK PAIN WITH LEFT-SIDED SCIATICA: ICD-10-CM

## 2025-08-08 DIAGNOSIS — M54.9 CHRONIC UPPER BACK PAIN: Chronic | ICD-10-CM

## 2025-08-08 PROCEDURE — 97110 THERAPEUTIC EXERCISES: CPT | Mod: GP,CQ

## 2025-08-08 PROCEDURE — 97112 NEUROMUSCULAR REEDUCATION: CPT | Mod: GP,CQ

## 2025-08-08 NOTE — PROGRESS NOTES
"Physical Therapy Treatment    Patient Name: Annie Joseph  MRN: [id number]  YOB: 1955  Encounter Date: 8/8/2025    Time Entry:  Time Calculation  Start Time: 1245  Stop Time: 1330  Time Calculation (min): 45 min     PT Therapeutic Procedures Time Entry  Therapeutic Exercise Time Entry: 36  Neuromuscular Re-Education Time Entry: 9                   Rehab Insurance Information:   Visit Count: 3  POC Visits: 12  Auth Required: No (07/02/2025: NO AUTH, MEDICARE A/B, MMO SUPP, ($0 USED PT/ST)  Progress Report due visit #10)                      Problem List Items Addressed This Visit           ICD-10-CM    Fibromyalgia M79.7    Low back pain M54.50    Chronic pain of both knees M25.561, M25.562, G89.29    Chronic pain of both shoulders (Chronic) M25.511, G89.29, M25.512    Chronic upper back pain (Chronic) M54.9, G89.29     Other Visit Diagnoses         Codes      Lumbar radiculopathy     M54.16      Bilateral chronic knee pain     M25.561, M25.562, G89.29      Cervical spondylosis without myelopathy     M47.812      Thoracic spondylosis without myelopathy     M47.814            Precautions       Additional Precautions and Protocol Details: Red flags: Hx of Claire kidney CA, left kidney removed, currently has kidney disease Pre-DM Osteopenia 2 stent, MI 2022 Bottom of both lungs are not inflated                                                          Precautions  CA Pre DM Heart Disease HTN Osteopenia OA Fibryomyalgia                                          Subjective   pt briefly tardy to session, able to accommodate.  pt presents for first session since July 9, 2025.  reports \"I threw my back out\" a couple of weeks back, picking up sewing machine.  pt reports she had an epidural injection end of July.  pt reports some compliance with HEP, though \"not enough to make a difference\".  pt reports she had had OOT guests and when they left \"I was going to pout for a couple of days\".  pt reports current LBP " "rated at 4 of 10.   pt denies any falls since last session, though at times does use a cane if feeling unsteady..  Pain reported as 4.           Objective             Gait Analysis  Gait Pattern: Antalgic  Walking Speed: Decreased            Gait Analysis Details  Pt demonstrating lack of trunk rotation during ambulation.   Pt presents with no assistive device         Activities   Therapeutic Exercise  Therapeutic Exercise Performed: Yes  Therapeutic Exercise Activity 1: Nu-step S= 7  R=1   x 7 minutes  Therapeutic Exercise Activity 2: standing upright row, orange band,  2 x 10 with TrA bracing  Therapeutic Exercise Activity 3: standing shoulder extension, orange band, 2 x 10 with TrA bracing.  Therapeutic Exercise Activity 4: seated lumbar flexion stretch, RSB,  x 10 reps 5\" holds, gentle effort  Therapeutic Exercise Activity 5: fwd step up , 4\"  x 5 reps each R and L           Balance/Neuromuscular Re-Education  Balance/Neuromuscular Re-Education Activity Performed: Yes  Balance/Neuromuscular Re-Education Activity 1: fwd step up onto blue Airex pad R and L x 10 reps each; 1 Hand support  Balance/Neuromuscular Re-Education Activity 2: stand on blue Airex pad unupported,  NBOS  EO x 1'  EC  x 30\"  Balance/Neuromuscular Re-Education Activity 3: MIP in blue Airex pad, fingertip assist,  x 10 reps                                           Education             Access Code: [email or web address] URL: [email or web address] Date: 08/08/2025 Prepared by: Bari Eugene Exercises - Standing Shoulder Row with Anchored Resistance  - 1-2 x daily - 6 x weekly - 2 sets - 10 reps - Shoulder extension with resistance - Neutral  - 1-2 x daily - 6 x weekly - 2 sets - 10 reps - Seated Lumbar Flexion Stretch  - 1-2 x daily - 6 x weekly - 5 reps      Assessment/Plan   Assessment: pt provided intermittent verbal and tactile cueing for proper form with all exericises, as well as avoidnce of compensatory movements.  pt reports feeling less " pain post session. Patient tolerated treatment well      Plan: continue per established POC

## 2025-08-14 ENCOUNTER — TREATMENT (OUTPATIENT)
Dept: PHYSICAL THERAPY | Facility: CLINIC | Age: 70
End: 2025-08-14
Payer: MEDICARE

## 2025-08-14 DIAGNOSIS — M25.562 BILATERAL CHRONIC KNEE PAIN: ICD-10-CM

## 2025-08-14 DIAGNOSIS — M54.42 CHRONIC BILATERAL LOW BACK PAIN WITH LEFT-SIDED SCIATICA: ICD-10-CM

## 2025-08-14 DIAGNOSIS — M25.561 CHRONIC PAIN OF BOTH KNEES: ICD-10-CM

## 2025-08-14 DIAGNOSIS — M54.16 LUMBAR RADICULOPATHY: ICD-10-CM

## 2025-08-14 DIAGNOSIS — G89.29 CHRONIC PAIN OF BOTH SHOULDERS: Chronic | ICD-10-CM

## 2025-08-14 DIAGNOSIS — M79.7 FIBROMYALGIA: ICD-10-CM

## 2025-08-14 DIAGNOSIS — G89.29 CHRONIC PAIN OF BOTH KNEES: ICD-10-CM

## 2025-08-14 DIAGNOSIS — M47.814 THORACIC SPONDYLOSIS WITHOUT MYELOPATHY: ICD-10-CM

## 2025-08-14 DIAGNOSIS — M25.512 CHRONIC PAIN OF BOTH SHOULDERS: Chronic | ICD-10-CM

## 2025-08-14 DIAGNOSIS — M25.562 CHRONIC PAIN OF BOTH KNEES: ICD-10-CM

## 2025-08-14 DIAGNOSIS — M25.561 BILATERAL CHRONIC KNEE PAIN: ICD-10-CM

## 2025-08-14 DIAGNOSIS — G89.29 BILATERAL CHRONIC KNEE PAIN: ICD-10-CM

## 2025-08-14 DIAGNOSIS — G89.29 CHRONIC UPPER BACK PAIN: Chronic | ICD-10-CM

## 2025-08-14 DIAGNOSIS — M25.511 CHRONIC PAIN OF BOTH SHOULDERS: Chronic | ICD-10-CM

## 2025-08-14 DIAGNOSIS — G89.29 CHRONIC BILATERAL LOW BACK PAIN WITH LEFT-SIDED SCIATICA: ICD-10-CM

## 2025-08-14 DIAGNOSIS — M54.9 CHRONIC UPPER BACK PAIN: Chronic | ICD-10-CM

## 2025-08-14 DIAGNOSIS — M47.812 CERVICAL SPONDYLOSIS WITHOUT MYELOPATHY: ICD-10-CM

## 2025-08-14 PROCEDURE — 97110 THERAPEUTIC EXERCISES: CPT | Mod: GP,CQ

## 2025-08-14 PROCEDURE — 97112 NEUROMUSCULAR REEDUCATION: CPT | Mod: GP,CQ

## 2025-08-15 ENCOUNTER — OFFICE VISIT (OUTPATIENT)
Dept: OBSTETRICS AND GYNECOLOGY | Facility: CLINIC | Age: 70
End: 2025-08-15
Payer: MEDICARE

## 2025-08-15 ENCOUNTER — APPOINTMENT (OUTPATIENT)
Dept: RHEUMATOLOGY | Facility: CLINIC | Age: 70
End: 2025-08-15
Payer: MEDICARE

## 2025-08-15 VITALS
DIASTOLIC BLOOD PRESSURE: 65 MMHG | WEIGHT: 201.6 LBS | BODY MASS INDEX: 37.1 KG/M2 | HEIGHT: 62 IN | HEART RATE: 82 BPM | SYSTOLIC BLOOD PRESSURE: 127 MMHG

## 2025-08-15 DIAGNOSIS — N36.8 URETHRAL PROLAPSE: ICD-10-CM

## 2025-08-15 DIAGNOSIS — N32.81 OAB (OVERACTIVE BLADDER): ICD-10-CM

## 2025-08-15 DIAGNOSIS — N95.2 VAGINAL ATROPHY: Primary | ICD-10-CM

## 2025-08-15 PROCEDURE — 99214 OFFICE O/P EST MOD 30 MIN: CPT | Performed by: OBSTETRICS & GYNECOLOGY

## 2025-08-15 PROCEDURE — G2211 COMPLEX E/M VISIT ADD ON: HCPCS | Performed by: OBSTETRICS & GYNECOLOGY

## 2025-08-15 PROCEDURE — 81003 URINALYSIS AUTO W/O SCOPE: CPT | Performed by: OBSTETRICS & GYNECOLOGY

## 2025-08-15 PROCEDURE — 3078F DIAST BP <80 MM HG: CPT | Performed by: OBSTETRICS & GYNECOLOGY

## 2025-08-15 PROCEDURE — 3074F SYST BP LT 130 MM HG: CPT | Performed by: OBSTETRICS & GYNECOLOGY

## 2025-08-15 PROCEDURE — 1159F MED LIST DOCD IN RCRD: CPT | Performed by: OBSTETRICS & GYNECOLOGY

## 2025-08-15 PROCEDURE — 4010F ACE/ARB THERAPY RXD/TAKEN: CPT | Performed by: OBSTETRICS & GYNECOLOGY

## 2025-08-15 PROCEDURE — 3008F BODY MASS INDEX DOCD: CPT | Performed by: OBSTETRICS & GYNECOLOGY

## 2025-08-15 ASSESSMENT — ENCOUNTER SYMPTOMS
GASTROINTESTINAL NEGATIVE: 1
DEPRESSION: 0
MUSCULOSKELETAL NEGATIVE: 1
CARDIOVASCULAR NEGATIVE: 1
RESPIRATORY NEGATIVE: 1
NEUROLOGICAL NEGATIVE: 1
ENDOCRINE NEGATIVE: 1
EYES NEGATIVE: 1
PSYCHIATRIC NEGATIVE: 1
LOSS OF SENSATION IN FEET: 0
CONSTITUTIONAL NEGATIVE: 1
OCCASIONAL FEELINGS OF UNSTEADINESS: 0

## 2025-08-18 ENCOUNTER — TREATMENT (OUTPATIENT)
Dept: PHYSICAL THERAPY | Facility: CLINIC | Age: 70
End: 2025-08-18
Payer: MEDICARE

## 2025-08-18 DIAGNOSIS — M54.42 CHRONIC BILATERAL LOW BACK PAIN WITH LEFT-SIDED SCIATICA: ICD-10-CM

## 2025-08-18 DIAGNOSIS — G89.29 CHRONIC UPPER BACK PAIN: Chronic | ICD-10-CM

## 2025-08-18 DIAGNOSIS — M25.561 CHRONIC PAIN OF BOTH KNEES: ICD-10-CM

## 2025-08-18 DIAGNOSIS — M54.9 CHRONIC UPPER BACK PAIN: Chronic | ICD-10-CM

## 2025-08-18 DIAGNOSIS — G89.29 BILATERAL CHRONIC KNEE PAIN: ICD-10-CM

## 2025-08-18 DIAGNOSIS — M54.16 LUMBAR RADICULOPATHY: ICD-10-CM

## 2025-08-18 DIAGNOSIS — M25.511 CHRONIC PAIN OF BOTH SHOULDERS: Chronic | ICD-10-CM

## 2025-08-18 DIAGNOSIS — M25.562 CHRONIC PAIN OF BOTH KNEES: ICD-10-CM

## 2025-08-18 DIAGNOSIS — G89.29 CHRONIC PAIN OF BOTH KNEES: ICD-10-CM

## 2025-08-18 DIAGNOSIS — M79.7 FIBROMYALGIA: ICD-10-CM

## 2025-08-18 DIAGNOSIS — G89.29 CHRONIC BILATERAL LOW BACK PAIN WITH LEFT-SIDED SCIATICA: ICD-10-CM

## 2025-08-18 DIAGNOSIS — M25.512 CHRONIC PAIN OF BOTH SHOULDERS: Chronic | ICD-10-CM

## 2025-08-18 DIAGNOSIS — M25.562 BILATERAL CHRONIC KNEE PAIN: ICD-10-CM

## 2025-08-18 DIAGNOSIS — G89.29 CHRONIC PAIN OF BOTH SHOULDERS: Chronic | ICD-10-CM

## 2025-08-18 DIAGNOSIS — M47.814 THORACIC SPONDYLOSIS WITHOUT MYELOPATHY: ICD-10-CM

## 2025-08-18 DIAGNOSIS — M25.561 BILATERAL CHRONIC KNEE PAIN: ICD-10-CM

## 2025-08-18 DIAGNOSIS — M47.812 CERVICAL SPONDYLOSIS WITHOUT MYELOPATHY: ICD-10-CM

## 2025-08-18 PROCEDURE — 97110 THERAPEUTIC EXERCISES: CPT | Mod: GP | Performed by: PHYSICAL THERAPIST

## 2025-08-20 ENCOUNTER — TREATMENT (OUTPATIENT)
Dept: PHYSICAL THERAPY | Facility: CLINIC | Age: 70
End: 2025-08-20
Payer: MEDICARE

## 2025-08-20 DIAGNOSIS — M25.562 CHRONIC PAIN OF BOTH KNEES: ICD-10-CM

## 2025-08-20 DIAGNOSIS — M54.9 CHRONIC UPPER BACK PAIN: Chronic | ICD-10-CM

## 2025-08-20 DIAGNOSIS — M54.42 CHRONIC BILATERAL LOW BACK PAIN WITH LEFT-SIDED SCIATICA: ICD-10-CM

## 2025-08-20 DIAGNOSIS — G89.29 CHRONIC PAIN OF BOTH SHOULDERS: Chronic | ICD-10-CM

## 2025-08-20 DIAGNOSIS — M54.16 LUMBAR RADICULOPATHY: ICD-10-CM

## 2025-08-20 DIAGNOSIS — M25.561 CHRONIC PAIN OF BOTH KNEES: ICD-10-CM

## 2025-08-20 DIAGNOSIS — G89.29 CHRONIC PAIN OF BOTH KNEES: ICD-10-CM

## 2025-08-20 DIAGNOSIS — G89.29 CHRONIC BILATERAL LOW BACK PAIN WITH LEFT-SIDED SCIATICA: ICD-10-CM

## 2025-08-20 DIAGNOSIS — M47.814 THORACIC SPONDYLOSIS WITHOUT MYELOPATHY: ICD-10-CM

## 2025-08-20 DIAGNOSIS — M25.561 BILATERAL CHRONIC KNEE PAIN: ICD-10-CM

## 2025-08-20 DIAGNOSIS — G89.29 BILATERAL CHRONIC KNEE PAIN: ICD-10-CM

## 2025-08-20 DIAGNOSIS — M47.812 CERVICAL SPONDYLOSIS WITHOUT MYELOPATHY: ICD-10-CM

## 2025-08-20 DIAGNOSIS — G89.29 CHRONIC UPPER BACK PAIN: Chronic | ICD-10-CM

## 2025-08-20 DIAGNOSIS — M25.512 CHRONIC PAIN OF BOTH SHOULDERS: Chronic | ICD-10-CM

## 2025-08-20 DIAGNOSIS — M25.562 BILATERAL CHRONIC KNEE PAIN: ICD-10-CM

## 2025-08-20 DIAGNOSIS — M79.7 FIBROMYALGIA: ICD-10-CM

## 2025-08-20 DIAGNOSIS — M25.511 CHRONIC PAIN OF BOTH SHOULDERS: Chronic | ICD-10-CM

## 2025-08-20 PROCEDURE — 97110 THERAPEUTIC EXERCISES: CPT | Mod: GP | Performed by: PHYSICAL THERAPIST

## 2025-08-22 ENCOUNTER — TELEPHONE (OUTPATIENT)
Dept: PRIMARY CARE | Facility: CLINIC | Age: 70
End: 2025-08-22
Payer: MEDICARE

## 2025-08-22 DIAGNOSIS — M79.7 FIBROMYALGIA: ICD-10-CM

## 2025-08-22 RX ORDER — METHOCARBAMOL 500 MG/1
500 TABLET, FILM COATED ORAL NIGHTLY
Qty: 90 TABLET | Refills: 0 | Status: SHIPPED | OUTPATIENT
Start: 2025-08-22 | End: 2025-11-20

## 2025-08-22 RX ORDER — DULOXETIN HYDROCHLORIDE 60 MG/1
60 CAPSULE, DELAYED RELEASE ORAL DAILY
Qty: 90 CAPSULE | Refills: 0 | Status: SHIPPED | OUTPATIENT
Start: 2025-08-22

## 2025-08-25 ENCOUNTER — APPOINTMENT (OUTPATIENT)
Dept: PHYSICAL THERAPY | Facility: CLINIC | Age: 70
End: 2025-08-25
Payer: MEDICARE

## 2025-08-25 ENCOUNTER — DOCUMENTATION (OUTPATIENT)
Dept: PHYSICAL THERAPY | Facility: CLINIC | Age: 70
End: 2025-08-25
Payer: MEDICARE

## 2025-08-25 DIAGNOSIS — M54.9 CHRONIC UPPER BACK PAIN: Chronic | ICD-10-CM

## 2025-08-25 DIAGNOSIS — G89.29 CHRONIC PAIN OF BOTH SHOULDERS: Chronic | ICD-10-CM

## 2025-08-25 DIAGNOSIS — M25.511 CHRONIC PAIN OF BOTH SHOULDERS: Chronic | ICD-10-CM

## 2025-08-25 DIAGNOSIS — G89.29 CHRONIC UPPER BACK PAIN: Chronic | ICD-10-CM

## 2025-08-25 DIAGNOSIS — M25.512 CHRONIC PAIN OF BOTH SHOULDERS: Chronic | ICD-10-CM

## 2025-08-27 ENCOUNTER — TREATMENT (OUTPATIENT)
Dept: PHYSICAL THERAPY | Facility: CLINIC | Age: 70
End: 2025-08-27
Payer: MEDICARE

## 2025-08-27 DIAGNOSIS — M47.814 THORACIC SPONDYLOSIS WITHOUT MYELOPATHY: ICD-10-CM

## 2025-08-27 DIAGNOSIS — M25.511 CHRONIC PAIN OF BOTH SHOULDERS: Chronic | ICD-10-CM

## 2025-08-27 DIAGNOSIS — G89.29 CHRONIC PAIN OF BOTH KNEES: ICD-10-CM

## 2025-08-27 DIAGNOSIS — M54.42 CHRONIC BILATERAL LOW BACK PAIN WITH LEFT-SIDED SCIATICA: ICD-10-CM

## 2025-08-27 DIAGNOSIS — G89.29 CHRONIC BILATERAL LOW BACK PAIN WITH LEFT-SIDED SCIATICA: ICD-10-CM

## 2025-08-27 DIAGNOSIS — G89.29 CHRONIC UPPER BACK PAIN: Chronic | ICD-10-CM

## 2025-08-27 DIAGNOSIS — G89.29 CHRONIC PAIN OF BOTH SHOULDERS: Chronic | ICD-10-CM

## 2025-08-27 DIAGNOSIS — M25.562 BILATERAL CHRONIC KNEE PAIN: ICD-10-CM

## 2025-08-27 DIAGNOSIS — M25.561 BILATERAL CHRONIC KNEE PAIN: ICD-10-CM

## 2025-08-27 DIAGNOSIS — M79.7 FIBROMYALGIA: ICD-10-CM

## 2025-08-27 DIAGNOSIS — M54.9 CHRONIC UPPER BACK PAIN: Chronic | ICD-10-CM

## 2025-08-27 DIAGNOSIS — M25.512 CHRONIC PAIN OF BOTH SHOULDERS: Chronic | ICD-10-CM

## 2025-08-27 DIAGNOSIS — M47.812 CERVICAL SPONDYLOSIS WITHOUT MYELOPATHY: ICD-10-CM

## 2025-08-27 DIAGNOSIS — M54.16 LUMBAR RADICULOPATHY: ICD-10-CM

## 2025-08-27 DIAGNOSIS — M25.562 CHRONIC PAIN OF BOTH KNEES: ICD-10-CM

## 2025-08-27 DIAGNOSIS — G89.29 BILATERAL CHRONIC KNEE PAIN: ICD-10-CM

## 2025-08-27 DIAGNOSIS — M25.561 CHRONIC PAIN OF BOTH KNEES: ICD-10-CM

## 2025-08-27 PROCEDURE — 97110 THERAPEUTIC EXERCISES: CPT | Mod: GP | Performed by: PHYSICAL THERAPIST

## 2025-09-03 ENCOUNTER — DOCUMENTATION (OUTPATIENT)
Dept: PHYSICAL THERAPY | Facility: CLINIC | Age: 70
End: 2025-09-03
Payer: MEDICARE

## 2025-09-03 DIAGNOSIS — M25.511 CHRONIC PAIN OF BOTH SHOULDERS: Chronic | ICD-10-CM

## 2025-09-03 DIAGNOSIS — G89.29 CHRONIC UPPER BACK PAIN: Chronic | ICD-10-CM

## 2025-09-03 DIAGNOSIS — G89.29 CHRONIC PAIN OF BOTH SHOULDERS: Chronic | ICD-10-CM

## 2025-09-03 DIAGNOSIS — M25.512 CHRONIC PAIN OF BOTH SHOULDERS: Chronic | ICD-10-CM

## 2025-09-03 DIAGNOSIS — M54.9 CHRONIC UPPER BACK PAIN: Chronic | ICD-10-CM

## 2025-09-19 ENCOUNTER — APPOINTMENT (OUTPATIENT)
Dept: RHEUMATOLOGY | Facility: CLINIC | Age: 70
End: 2025-09-19
Payer: MEDICARE

## 2025-10-23 ENCOUNTER — APPOINTMENT (OUTPATIENT)
Dept: SLEEP MEDICINE | Facility: CLINIC | Age: 70
End: 2025-10-23
Payer: MEDICARE

## 2025-11-18 ENCOUNTER — APPOINTMENT (OUTPATIENT)
Dept: UROLOGY | Facility: CLINIC | Age: 70
End: 2025-11-18
Payer: MEDICARE

## 2026-02-18 ENCOUNTER — APPOINTMENT (OUTPATIENT)
Dept: PRIMARY CARE | Facility: CLINIC | Age: 71
End: 2026-02-18
Payer: MEDICARE